# Patient Record
Sex: FEMALE | Race: OTHER | HISPANIC OR LATINO | URBAN - METROPOLITAN AREA
[De-identification: names, ages, dates, MRNs, and addresses within clinical notes are randomized per-mention and may not be internally consistent; named-entity substitution may affect disease eponyms.]

---

## 2017-12-10 ENCOUNTER — EMERGENCY (EMERGENCY)
Facility: HOSPITAL | Age: 42
LOS: 1 days | Discharge: HOME | End: 2017-12-10
Admitting: EMERGENCY MEDICINE
Payer: SELF-PAY

## 2017-12-10 VITALS
DIASTOLIC BLOOD PRESSURE: 62 MMHG | RESPIRATION RATE: 18 BRPM | TEMPERATURE: 98 F | WEIGHT: 169.98 LBS | OXYGEN SATURATION: 99 % | HEART RATE: 80 BPM | SYSTOLIC BLOOD PRESSURE: 101 MMHG

## 2017-12-10 DIAGNOSIS — Z98.51 TUBAL LIGATION STATUS: Chronic | ICD-10-CM

## 2017-12-10 DIAGNOSIS — R35.0 FREQUENCY OF MICTURITION: ICD-10-CM

## 2017-12-10 DIAGNOSIS — Z79.899 OTHER LONG TERM (CURRENT) DRUG THERAPY: ICD-10-CM

## 2017-12-10 DIAGNOSIS — Z79.1 LONG TERM (CURRENT) USE OF NON-STEROIDAL ANTI-INFLAMMATORIES (NSAID): ICD-10-CM

## 2017-12-10 DIAGNOSIS — N30.00 ACUTE CYSTITIS WITHOUT HEMATURIA: ICD-10-CM

## 2017-12-10 LAB
APPEARANCE UR: CLEAR — SIGNIFICANT CHANGE UP
BACTERIA # UR AUTO: PRESENT /HPF
BILIRUB UR-MCNC: NEGATIVE — SIGNIFICANT CHANGE UP
COLOR SPEC: YELLOW — SIGNIFICANT CHANGE UP
DIFF PNL FLD: NEGATIVE — SIGNIFICANT CHANGE UP
EPI CELLS # UR: (no result) /HPF (ref 0–5)
GLUCOSE UR QL: NEGATIVE — SIGNIFICANT CHANGE UP
KETONES UR-MCNC: NEGATIVE — SIGNIFICANT CHANGE UP
LEUKOCYTE ESTERASE UR-ACNC: (no result)
NITRITE UR-MCNC: NEGATIVE — SIGNIFICANT CHANGE UP
PH UR: 6 — SIGNIFICANT CHANGE UP (ref 5–8)
PROT UR-MCNC: NEGATIVE MG/DL — SIGNIFICANT CHANGE UP
RBC CASTS # UR COMP ASSIST: < 5 /HPF — SIGNIFICANT CHANGE UP
SP GR SPEC: 1.02 — SIGNIFICANT CHANGE UP (ref 1–1.03)
UROBILINOGEN FLD QL: 0.2 E.U./DL — SIGNIFICANT CHANGE UP
WBC UR QL: (no result) /HPF

## 2017-12-10 PROCEDURE — 99283 EMERGENCY DEPT VISIT LOW MDM: CPT

## 2017-12-10 RX ORDER — NITROFURANTOIN MACROCRYSTAL 50 MG
1 CAPSULE ORAL
Qty: 10 | Refills: 0
Start: 2017-12-10 | End: 2017-12-14

## 2017-12-10 NOTE — ED PROVIDER NOTE - MEDICAL DECISION MAKING DETAILS
pt will be given ABS and FU clinic I have discussed the discharge plan with the patient. The patient agrees with the plan, as discussed.  The patient understands Emergency Department diagnosis is a preliminary diagnosis often based on limited information and that the patient must adhere to the follow-up plan as discussed.  The patient understands that if the symptoms worsen or if prescribed medications do not have the desired/planned effect that the patient may return to the Emergency Department at any time for further evaluation and treatment.

## 2017-12-10 NOTE — ED PROVIDER NOTE - OBJECTIVE STATEMENT
pt to ed co pain and burning when urinating with urgency and frequency slight dysuria no fever no dizzy no headache no chills no NVD no chest pain no SOB no shakes no aches no other  injury no other complaints no CVAT

## 2017-12-12 LAB
-  AMPICILLIN/SULBACTAM: SIGNIFICANT CHANGE UP
-  AMPICILLIN: SIGNIFICANT CHANGE UP
-  CEFAZOLIN: SIGNIFICANT CHANGE UP
-  CEFTRIAXONE: SIGNIFICANT CHANGE UP
-  CIPROFLOXACIN: SIGNIFICANT CHANGE UP
-  GENTAMICIN: SIGNIFICANT CHANGE UP
-  NITROFURANTOIN: SIGNIFICANT CHANGE UP
-  PIPERACILLIN/TAZOBACTAM: SIGNIFICANT CHANGE UP
-  TOBRAMYCIN: SIGNIFICANT CHANGE UP
-  TRIMETHOPRIM/SULFAMETHOXAZOLE: SIGNIFICANT CHANGE UP
CULTURE RESULTS: SIGNIFICANT CHANGE UP
METHOD TYPE: SIGNIFICANT CHANGE UP
ORGANISM # SPEC MICROSCOPIC CNT: SIGNIFICANT CHANGE UP
ORGANISM # SPEC MICROSCOPIC CNT: SIGNIFICANT CHANGE UP
SPECIMEN SOURCE: SIGNIFICANT CHANGE UP

## 2018-02-11 ENCOUNTER — EMERGENCY (EMERGENCY)
Facility: HOSPITAL | Age: 43
LOS: 1 days | Discharge: HOME | End: 2018-02-11
Attending: EMERGENCY MEDICINE | Admitting: EMERGENCY MEDICINE
Payer: SELF-PAY

## 2018-02-11 VITALS
TEMPERATURE: 99 F | OXYGEN SATURATION: 98 % | SYSTOLIC BLOOD PRESSURE: 112 MMHG | HEART RATE: 107 BPM | RESPIRATION RATE: 20 BRPM | DIASTOLIC BLOOD PRESSURE: 73 MMHG | WEIGHT: 169.98 LBS | HEIGHT: 62 IN

## 2018-02-11 DIAGNOSIS — Z98.51 TUBAL LIGATION STATUS: Chronic | ICD-10-CM

## 2018-02-11 DIAGNOSIS — J02.9 ACUTE PHARYNGITIS, UNSPECIFIED: ICD-10-CM

## 2018-02-11 DIAGNOSIS — Z79.1 LONG TERM (CURRENT) USE OF NON-STEROIDAL ANTI-INFLAMMATORIES (NSAID): ICD-10-CM

## 2018-02-11 DIAGNOSIS — J11.1 INFLUENZA DUE TO UNIDENTIFIED INFLUENZA VIRUS WITH OTHER RESPIRATORY MANIFESTATIONS: ICD-10-CM

## 2018-02-11 DIAGNOSIS — Z79.899 OTHER LONG TERM (CURRENT) DRUG THERAPY: ICD-10-CM

## 2018-02-11 PROCEDURE — 99283 EMERGENCY DEPT VISIT LOW MDM: CPT

## 2018-02-11 NOTE — ED PROVIDER NOTE - PHYSICAL EXAMINATION
CONSTITUTIONAL: Well appearing, well nourished, awake, alert and in no apparent distress.  HEENT: Head is atraumatic. Eyes clear bilaterally, normal EOMI. Airway patent. no exudates, anterior lymphadenopathy  CARDIAC: Normal rate, regular rhythm.  Heart sounds S1, S2.   RESPIRATORY: Breath sounds clear and equal bilaterally.  GASTROINTESTINAL: Abdomen soft, non-tender, no guarding.  MUSCULOSKELETAL: Spine appears normal, range of motion is not limited, no muscle or joint tenderness.   NEUROLOGICAL: Alert and oriented, no focal deficits, no motor or sensory deficits.  SKIN: Skin normal color for race, warm, dry and intact. No evidence of rash.  PSYCHIATRIC: Alert and oriented to person, place, time/situation. normal mood and affect. no apparent risk to self or others.

## 2018-02-11 NOTE — ED PROVIDER NOTE - OBJECTIVE STATEMENT
Pt previously healthy with complaints of sorethroat, cough, headache for two days. no chest pain, sob, abd pain, n/v/f. states intermittent chills. has been trying dayquil for symptoms. has not received flu shot.

## 2018-02-11 NOTE — ED ADULT NURSE NOTE - OBJECTIVE STATEMENT
pt received in [facility] x 3 pt c/o fever , chills , sore throat and a productive cough( brings up yellow sputum )  since Friday

## 2018-02-11 NOTE — ED PROVIDER NOTE - MEDICAL DECISION MAKING DETAILS
s/s of  viral pharyngitis, centor negative. Advised on cont'd supportive care instructions lozenges, warm salt water gargles, etc. non toxic appearing, to return for worsening sx.

## 2018-07-13 ENCOUNTER — EMERGENCY (EMERGENCY)
Facility: HOSPITAL | Age: 43
LOS: 1 days | Discharge: HOME | End: 2018-07-13
Attending: EMERGENCY MEDICINE | Admitting: EMERGENCY MEDICINE
Payer: SELF-PAY

## 2018-07-13 VITALS
RESPIRATION RATE: 18 BRPM | TEMPERATURE: 100 F | WEIGHT: 177.91 LBS | SYSTOLIC BLOOD PRESSURE: 105 MMHG | OXYGEN SATURATION: 97 % | HEART RATE: 126 BPM | DIASTOLIC BLOOD PRESSURE: 59 MMHG

## 2018-07-13 VITALS
TEMPERATURE: 99 F | DIASTOLIC BLOOD PRESSURE: 68 MMHG | HEART RATE: 92 BPM | OXYGEN SATURATION: 96 % | RESPIRATION RATE: 18 BRPM | SYSTOLIC BLOOD PRESSURE: 104 MMHG

## 2018-07-13 DIAGNOSIS — Z79.1 LONG TERM (CURRENT) USE OF NON-STEROIDAL ANTI-INFLAMMATORIES (NSAID): ICD-10-CM

## 2018-07-13 DIAGNOSIS — J10.1 INFLUENZA DUE TO OTHER IDENTIFIED INFLUENZA VIRUS WITH OTHER RESPIRATORY MANIFESTATIONS: ICD-10-CM

## 2018-07-13 DIAGNOSIS — Z98.51 TUBAL LIGATION STATUS: Chronic | ICD-10-CM

## 2018-07-13 DIAGNOSIS — R50.9 FEVER, UNSPECIFIED: ICD-10-CM

## 2018-07-13 DIAGNOSIS — Z79.2 LONG TERM (CURRENT) USE OF ANTIBIOTICS: ICD-10-CM

## 2018-07-13 LAB
ALBUMIN SERPL ELPH-MCNC: 4.4 G/DL — SIGNIFICANT CHANGE UP (ref 3.3–5)
ALP SERPL-CCNC: 72 U/L — SIGNIFICANT CHANGE UP (ref 40–120)
ALT FLD-CCNC: 20 U/L — SIGNIFICANT CHANGE UP (ref 10–45)
ANION GAP SERPL CALC-SCNC: 13 MMOL/L — SIGNIFICANT CHANGE UP (ref 5–17)
ANISOCYTOSIS BLD QL: SLIGHT — SIGNIFICANT CHANGE UP
APPEARANCE UR: CLEAR — SIGNIFICANT CHANGE UP
AST SERPL-CCNC: 13 U/L — SIGNIFICANT CHANGE UP (ref 10–40)
BACTERIA # UR AUTO: PRESENT /HPF
BILIRUB SERPL-MCNC: 0.3 MG/DL — SIGNIFICANT CHANGE UP (ref 0.2–1.2)
BILIRUB UR-MCNC: NEGATIVE — SIGNIFICANT CHANGE UP
BUN SERPL-MCNC: 7 MG/DL — SIGNIFICANT CHANGE UP (ref 7–23)
CALCIUM SERPL-MCNC: 9.4 MG/DL — SIGNIFICANT CHANGE UP (ref 8.4–10.5)
CHLORIDE SERPL-SCNC: 99 MMOL/L — SIGNIFICANT CHANGE UP (ref 96–108)
CO2 SERPL-SCNC: 25 MMOL/L — SIGNIFICANT CHANGE UP (ref 22–31)
COLOR SPEC: YELLOW — SIGNIFICANT CHANGE UP
COMMENT - URINE: SIGNIFICANT CHANGE UP
CREAT SERPL-MCNC: 0.9 MG/DL — SIGNIFICANT CHANGE UP (ref 0.5–1.3)
DIFF PNL FLD: ABNORMAL
EOSINOPHIL NFR BLD AUTO: 2 % — SIGNIFICANT CHANGE UP (ref 0–6)
EPI CELLS # UR: SIGNIFICANT CHANGE UP /HPF (ref 0–5)
EXTRA BLUE TOP TUBE: SIGNIFICANT CHANGE UP
EXTRA SST TUBE: SIGNIFICANT CHANGE UP
FLUBV RNA SPEC QL NAA+PROBE: DETECTED
GLUCOSE SERPL-MCNC: 112 MG/DL — HIGH (ref 70–99)
GLUCOSE UR QL: NEGATIVE — SIGNIFICANT CHANGE UP
HCT VFR BLD CALC: 41.1 % — SIGNIFICANT CHANGE UP (ref 34.5–45)
HGB BLD-MCNC: 14 G/DL — SIGNIFICANT CHANGE UP (ref 11.5–15.5)
KETONES UR-MCNC: NEGATIVE — SIGNIFICANT CHANGE UP
LACTATE SERPL-SCNC: 1.6 MMOL/L — SIGNIFICANT CHANGE UP (ref 0.5–2)
LEUKOCYTE ESTERASE UR-ACNC: ABNORMAL
LYMPHOCYTES # BLD AUTO: 11 % — LOW (ref 13–44)
MACROCYTES BLD QL: SLIGHT — SIGNIFICANT CHANGE UP
MANUAL SMEAR VERIFICATION: SIGNIFICANT CHANGE UP
MCHC RBC-ENTMCNC: 28.9 PG — SIGNIFICANT CHANGE UP (ref 27–34)
MCHC RBC-ENTMCNC: 34.1 G/DL — SIGNIFICANT CHANGE UP (ref 32–36)
MCV RBC AUTO: 84.9 FL — SIGNIFICANT CHANGE UP (ref 80–100)
MICROCYTES BLD QL: SLIGHT — SIGNIFICANT CHANGE UP
MONOCYTES NFR BLD AUTO: 4 % — SIGNIFICANT CHANGE UP (ref 2–14)
NEUTROPHILS NFR BLD AUTO: 81 % — HIGH (ref 43–77)
NEUTS BAND # BLD: 2 % — SIGNIFICANT CHANGE UP
NITRITE UR-MCNC: NEGATIVE — SIGNIFICANT CHANGE UP
PH UR: 5.5 — SIGNIFICANT CHANGE UP (ref 5–8)
PLAT MORPH BLD: NORMAL — SIGNIFICANT CHANGE UP
PLATELET # BLD AUTO: 330 K/UL — SIGNIFICANT CHANGE UP (ref 150–400)
POTASSIUM SERPL-MCNC: 4.1 MMOL/L — SIGNIFICANT CHANGE UP (ref 3.5–5.3)
POTASSIUM SERPL-SCNC: 4.1 MMOL/L — SIGNIFICANT CHANGE UP (ref 3.5–5.3)
PROT SERPL-MCNC: 7.8 G/DL — SIGNIFICANT CHANGE UP (ref 6–8.3)
PROT UR-MCNC: NEGATIVE MG/DL — SIGNIFICANT CHANGE UP
RAPID RVP RESULT: DETECTED
RBC # BLD: 4.84 M/UL — SIGNIFICANT CHANGE UP (ref 3.8–5.2)
RBC # FLD: 12.6 % — SIGNIFICANT CHANGE UP (ref 10.3–16.9)
RBC BLD AUTO: ABNORMAL
RBC CASTS # UR COMP ASSIST: > 10 /HPF
SODIUM SERPL-SCNC: 137 MMOL/L — SIGNIFICANT CHANGE UP (ref 135–145)
SP GR SPEC: 1.01 — SIGNIFICANT CHANGE UP (ref 1–1.03)
UROBILINOGEN FLD QL: 0.2 E.U./DL — SIGNIFICANT CHANGE UP
WBC # BLD: 5.9 K/UL — SIGNIFICANT CHANGE UP (ref 3.8–10.5)
WBC # FLD AUTO: 5.9 K/UL — SIGNIFICANT CHANGE UP (ref 3.8–10.5)
WBC UR QL: ABNORMAL /HPF

## 2018-07-13 PROCEDURE — 99284 EMERGENCY DEPT VISIT MOD MDM: CPT

## 2018-07-13 PROCEDURE — 71045 X-RAY EXAM CHEST 1 VIEW: CPT | Mod: 26

## 2018-07-13 RX ORDER — SODIUM CHLORIDE 9 MG/ML
1000 INJECTION INTRAMUSCULAR; INTRAVENOUS; SUBCUTANEOUS ONCE
Refills: 0 | Status: COMPLETED | OUTPATIENT
Start: 2018-07-13 | End: 2018-07-13

## 2018-07-13 RX ORDER — ACETAMINOPHEN 500 MG
975 TABLET ORAL ONCE
Refills: 0 | Status: COMPLETED | OUTPATIENT
Start: 2018-07-13 | End: 2018-07-13

## 2018-07-13 RX ORDER — KETOROLAC TROMETHAMINE 30 MG/ML
30 SYRINGE (ML) INJECTION ONCE
Refills: 0 | Status: DISCONTINUED | OUTPATIENT
Start: 2018-07-13 | End: 2018-07-13

## 2018-07-13 RX ADMIN — SODIUM CHLORIDE 1000 MILLILITER(S): 9 INJECTION INTRAMUSCULAR; INTRAVENOUS; SUBCUTANEOUS at 12:46

## 2018-07-13 RX ADMIN — Medication 975 MILLIGRAM(S): at 12:17

## 2018-07-13 RX ADMIN — Medication 30 MILLIGRAM(S): at 14:23

## 2018-07-13 RX ADMIN — SODIUM CHLORIDE 1000 MILLILITER(S): 9 INJECTION INTRAMUSCULAR; INTRAVENOUS; SUBCUTANEOUS at 12:17

## 2018-07-13 NOTE — ED PROVIDER NOTE - OBJECTIVE STATEMENT
43 y/o f no pmh presents c/o cough, fever, chills, body aches for the past 4 days.  Pt stating symptoms started after visiting her mother in [place] last week who had the flu.  Pt denies abd pain, CP, SOB, all other ROS negative.

## 2018-07-13 NOTE — ED PROVIDER NOTE - MEDICAL DECISION MAKING DETAILS
41 y/o f cough, body aches, chills, fever x 4 days; tachycardic on exam, otherwise unremarkable, cxr negative, given IV fluid and tylenol/toradol with improvement in heart rate, influenza B +, pt feeling better, stable for d/c, supportive treatment with oral hydration, rest, tylenol

## 2018-07-13 NOTE — ED ADULT NURSE NOTE - OBJECTIVE STATEMENT
Patient presents to the ED complaining of fever and chills, since wednesday. Reports mother was diagnosed with flu on wednesday. Sepsis work up initiated.

## 2018-07-13 NOTE — ED ADULT TRIAGE NOTE - CHIEF COMPLAINT QUOTE
41 y/o female c/o fever, chills, cough, body aches since Monday. Pt reports mother was recently diagnosed with Flu.

## 2018-07-13 NOTE — ED PROVIDER NOTE - ATTENDING CONTRIBUTION TO CARE
43 yo F no PMH p/w cough, fever, chills, recent travel to [place].  Mother with similar + flu.  Tolerating PO, no sob.  Pt looks well.  Febrile + tachy.  Lungs clear.  CXR clear.  Labs noted + flu.  Plan supportive tx and outpt fu.

## 2018-07-14 LAB
CULTURE RESULTS: NO GROWTH — SIGNIFICANT CHANGE UP
SPECIMEN SOURCE: SIGNIFICANT CHANGE UP

## 2018-07-18 LAB
CULTURE RESULTS: SIGNIFICANT CHANGE UP
CULTURE RESULTS: SIGNIFICANT CHANGE UP
SPECIMEN SOURCE: SIGNIFICANT CHANGE UP
SPECIMEN SOURCE: SIGNIFICANT CHANGE UP

## 2018-12-21 ENCOUNTER — EMERGENCY (EMERGENCY)
Facility: HOSPITAL | Age: 43
LOS: 1 days | Discharge: HOME | End: 2018-12-21
Attending: EMERGENCY MEDICINE | Admitting: EMERGENCY MEDICINE
Payer: COMMERCIAL

## 2018-12-21 VITALS
OXYGEN SATURATION: 97 % | SYSTOLIC BLOOD PRESSURE: 108 MMHG | HEIGHT: 62 IN | WEIGHT: 177.91 LBS | RESPIRATION RATE: 18 BRPM | TEMPERATURE: 98 F | DIASTOLIC BLOOD PRESSURE: 71 MMHG | HEART RATE: 81 BPM

## 2018-12-21 VITALS
OXYGEN SATURATION: 97 % | RESPIRATION RATE: 18 BRPM | TEMPERATURE: 98 F | SYSTOLIC BLOOD PRESSURE: 99 MMHG | DIASTOLIC BLOOD PRESSURE: 66 MMHG | HEART RATE: 73 BPM

## 2018-12-21 DIAGNOSIS — Z98.51 TUBAL LIGATION STATUS: Chronic | ICD-10-CM

## 2018-12-21 PROCEDURE — 99283 EMERGENCY DEPT VISIT LOW MDM: CPT

## 2018-12-21 RX ORDER — OXYMETAZOLINE HYDROCHLORIDE 0.5 MG/ML
1 SPRAY NASAL ONCE
Refills: 0 | Status: COMPLETED | OUTPATIENT
Start: 2018-12-21 | End: 2018-12-21

## 2018-12-21 RX ORDER — ACETAMINOPHEN 500 MG
650 TABLET ORAL ONCE
Refills: 0 | Status: COMPLETED | OUTPATIENT
Start: 2018-12-21 | End: 2018-12-21

## 2018-12-21 RX ADMIN — Medication 650 MILLIGRAM(S): at 22:29

## 2018-12-21 RX ADMIN — OXYMETAZOLINE HYDROCHLORIDE 1 SPRAY(S): 0.5 SPRAY NASAL at 22:28

## 2018-12-21 NOTE — ED PROVIDER NOTE - NSFOLLOWUPINSTRUCTIONS_ED_ALL_ED_FT
Hemorragia nasal en los adultos  (Nosebleed, Adult)  ImageCuando hay hemorragia nasal, sale sylvia de la nariz. Las hemorragias nasales son frecuentes. Por lo general, no son un signo de casper afección grave.    Puede jean-pierre casper hemorragia nasal cuando un pequeño vaso sanguíneo de la nariz comienza a sangrar o si el recubrimiento de la nariz (membrana mucosa) se agrieta. Las causas frecuentes de las hemorragias nasales pueden ser las siguientes:    Alergias.  Resfríos.  Hurgarse la nariz.  Sonarse la nariz con demasiada intensidad.  Casper lesión por meterse un objeto en la nariz o recibir un golpe en la nariz.  Aire seco o frío.  Algunas causas menos frecuentes de las hemorragias nasales incluyen lo siguiente:    Gases tóxicos.  Algo anormal en la nariz o en los espacios llenos de aire en los huesos de la rama (senos).  Crecimientos en la nariz, cher pólipos.  Medicamentos o afecciones que enlentecen la coagulación de la sylvia.  Ciertas enfermedades o procedimientos que irritan o secan las fosas nasales.  INSTRUCCIONES PARA EL CUIDADO EN EL HOGAR  Si tiene casper hemorragia nasal:     Siéntese e incline la anna ligeramente hacia adelante.  Utilice casper toalla o un pañuelo de papel limpios para apretar los orificios nasales debajo de la parte ósea de la nariz. Después de 10 minutos, suelte la nariz y compruebe si vuelve a sangrar. No quite la presión antes de cirilo tiempo. Si aún hay hemorragia, repita la presión y sostenga brittany 10 minutos hasta que la hemorragia se detenga.  No coloque pañuelos de papel ni gasa en la nariz para detener la hemorragia.  Evite recostarse e inclinar la anna hacia atrás. Eso puede provocar casper acumulación de sylvia en la garganta y producir ahogo o tos.  Use un aerosol nasal descongestivo para aliviar casper hemorragia nasal cher se lo haya indicado el médico.  No se ponga vaselina ni aceite de vaselina en la nariz. Estos productos pueden gotear dentro de los pulmones.  Después de casper hemorragia nasal:     Evite sonarse la nariz u olfatear brittany unas cuantas horas.  No sherry esfuerzos, no levante objetos y no doble la cintura para agacharse brittany varios días. Puede retomar otras actividades normales tan pronto cher pueda.  Utilice un aerosol salino o un humidificador cher se lo haya indicado el médico.  La aspirina y los anticoagulantes aumentan la probabilidad de hemorragias. Si les recetan estos medicamentos y sufre de hemorragias nasales:    Pregúntele al médico si debe dejar de william los medicamentos o si debe ajustar la dosis.  No deje de william los medicamentos recomendados por el médico angelia que bo le indique lo contrario.    Si la hemorragia nasal se debe a la sequedad de las membranas mucosas, use un gel o aerosol nasal de solución salina de venta brennan. Dolores mantendrá las mucosas húmedas y permitirá que se cicatricen. Si debe usar un lubricante:    Elija nick que sea soluble en agua.  Use solamente la cantidad que necesita y con la frecuencia necesaria.  No se recueste hasta que hayan transcurrido varias horas después de usarlo.    SOLICITE ATENCIÓN MÉDICA SI:  Tiene fiebre.  Tiene hemorragias nasales frecuentes o con mayor frecuencia que lo habitual.  Aparecen hematomas con mucha facilidad.  Tiene casper hemorragia nasal por tener algo metido en la nariz.  Tiene sangrado en la boca.  Vomita o libera casper sustancia marrón al toser.  Tiene casper hemorragia nasal después de comenzar un medicamento nuevo.  SOLICITE ATENCIÓN MÉDICA DE INMEDIATO SI:  Tiene casper hemorragia nasal después de casper caída o casper lesión en la anna.  Tiene casper hemorragia nasal que no desaparece después de 20 minutos.  Siente que se desvanece o está débil.  Tiene hemorragias fuera de lo común en otras partes del cuerpo.  Tiene hematomas fuera de lo común en otras partes del cuerpo.  Tiene sudoración.  Vomita sylvia.  Esta información no tiene cher fin reemplazar el consejo del médico. Asegúrese de hacerle al médico cualquier pregunta que tenga.

## 2018-12-21 NOTE — ED PROVIDER NOTE - NSFOLLOWUPCLINICS_GEN_ALL_ED_FT
[facility]  ENT  [address] [place] [address]  Phone: [phone]  Fax: [phone]  Follow Up Time:

## 2018-12-21 NOTE — ED PROVIDER NOTE - OBJECTIVE STATEMENT
Pt previously healthy with complaints of nose bleed for three days, intermittently, cannot remember what triggered it, mainly from L side, no ac use, has some assoc frontal headache, no f/c. Pt has not tried anything for symptoms, no other aggravating or relieving factors.

## 2018-12-21 NOTE — ED ADULT TRIAGE NOTE - CHIEF COMPLAINT QUOTE
"I have been having nose bleeds for about 1 week everyday and today it was 4 times lasting about 5mins every time and I have a headache".

## 2018-12-21 NOTE — ED ADULT NURSE NOTE - NSIMPLEMENTINTERV_GEN_ALL_ED
Implemented All Universal Safety Interventions:  [place] to call system. Call bell, personal items and telephone within reach. Instruct patient to call for assistance. Room bathroom lighting operational. Non-slip footwear when patient is off stretcher. Physically safe environment: no spills, clutter or unnecessary equipment. Stretcher in lowest position, wheels locked, appropriate side rails in place.

## 2018-12-21 NOTE — ED PROVIDER NOTE - MEDICAL DECISION MAKING DETAILS
epistaxis, no unstable VS, posterior bleed, resolved after pressure in ED, will give instruc on limited afrin, vaseline, nose bleeding prec given- no nose blowing/humidifier, to return for any worsening sx

## 2018-12-21 NOTE — ED ADULT NURSE NOTE - OBJECTIVE STATEMENT
Pt presents to ED reporting nose bleeds every day x1 week, now has headache, no bleeding at this time. VSS.

## 2018-12-21 NOTE — ED PROVIDER NOTE - PHYSICAL EXAMINATION
CONSTITUTIONAL: Well appearing, well nourished, awake, alert and in no apparent distress.  HEENT: Head is atraumatic. Eyes clear bilaterally, normal EOMI. Airway patent. no  bleeding to posterior pharynx, bleeding sites visualized on septum on L not bleeding  CARDIAC: Normal rate, regular rhythm.  Heart sounds S1, S2.   RESPIRATORY: Breath sounds clear and equal bilaterally. no tachypnea, respiratory distress.   GASTROINTESTINAL: Abdomen soft, non-tender, no guarding, distension.  MUSCULOSKELETAL: Spine appears normal, no midline spinal tenderness, range of motion is not limited, no muscle or joint tenderness.   NEUROLOGICAL: Alert and oriented, no focal deficits, no motor or sensory deficits.  SKIN: Skin normal color for race, warm, dry and intact. No evidence of rash.  PSYCHIATRIC: Alert and oriented to person, place, time/situation. normal mood and affect. no apparent risk to self or others.

## 2018-12-25 DIAGNOSIS — R04.0 EPISTAXIS: ICD-10-CM

## 2018-12-25 DIAGNOSIS — Z79.2 LONG TERM (CURRENT) USE OF ANTIBIOTICS: ICD-10-CM

## 2018-12-25 DIAGNOSIS — Z79.1 LONG TERM (CURRENT) USE OF NON-STEROIDAL ANTI-INFLAMMATORIES (NSAID): ICD-10-CM

## 2019-12-27 ENCOUNTER — EMERGENCY (EMERGENCY)
Facility: HOSPITAL | Age: 44
LOS: 1 days | Discharge: HOME | End: 2019-12-27
Attending: EMERGENCY MEDICINE | Admitting: EMERGENCY MEDICINE
Payer: MEDICAID

## 2019-12-27 VITALS
SYSTOLIC BLOOD PRESSURE: 143 MMHG | HEIGHT: 61 IN | WEIGHT: 180.78 LBS | OXYGEN SATURATION: 97 % | TEMPERATURE: 102 F | HEART RATE: 137 BPM | DIASTOLIC BLOOD PRESSURE: 84 MMHG | RESPIRATION RATE: 18 BRPM

## 2019-12-27 VITALS
HEART RATE: 95 BPM | DIASTOLIC BLOOD PRESSURE: 71 MMHG | RESPIRATION RATE: 18 BRPM | TEMPERATURE: 98 F | SYSTOLIC BLOOD PRESSURE: 107 MMHG | OXYGEN SATURATION: 100 %

## 2019-12-27 DIAGNOSIS — Z98.51 TUBAL LIGATION STATUS: Chronic | ICD-10-CM

## 2019-12-27 LAB
ALBUMIN SERPL ELPH-MCNC: 4.3 G/DL — SIGNIFICANT CHANGE UP (ref 3.3–5)
ALP SERPL-CCNC: 81 U/L — SIGNIFICANT CHANGE UP (ref 40–120)
ALT FLD-CCNC: 27 U/L — SIGNIFICANT CHANGE UP (ref 10–45)
ANION GAP SERPL CALC-SCNC: 13 MMOL/L — SIGNIFICANT CHANGE UP (ref 5–17)
APPEARANCE UR: CLEAR — SIGNIFICANT CHANGE UP
APTT BLD: 34.2 SEC — SIGNIFICANT CHANGE UP (ref 27.5–36.3)
AST SERPL-CCNC: 18 U/L — SIGNIFICANT CHANGE UP (ref 10–40)
BACTERIA # UR AUTO: PRESENT /HPF
BASOPHILS # BLD AUTO: 0.04 K/UL — SIGNIFICANT CHANGE UP (ref 0–0.2)
BASOPHILS NFR BLD AUTO: 0.3 % — SIGNIFICANT CHANGE UP (ref 0–2)
BILIRUB SERPL-MCNC: 0.5 MG/DL — SIGNIFICANT CHANGE UP (ref 0.2–1.2)
BILIRUB UR-MCNC: NEGATIVE — SIGNIFICANT CHANGE UP
BUN SERPL-MCNC: 7 MG/DL — SIGNIFICANT CHANGE UP (ref 7–23)
CALCIUM SERPL-MCNC: 8.8 MG/DL — SIGNIFICANT CHANGE UP (ref 8.4–10.5)
CHLORIDE SERPL-SCNC: 101 MMOL/L — SIGNIFICANT CHANGE UP (ref 96–108)
CO2 SERPL-SCNC: 24 MMOL/L — SIGNIFICANT CHANGE UP (ref 22–31)
COLOR SPEC: YELLOW — SIGNIFICANT CHANGE UP
CREAT SERPL-MCNC: 0.75 MG/DL — SIGNIFICANT CHANGE UP (ref 0.5–1.3)
DIFF PNL FLD: ABNORMAL
EOSINOPHIL # BLD AUTO: 0.07 K/UL — SIGNIFICANT CHANGE UP (ref 0–0.5)
EOSINOPHIL NFR BLD AUTO: 0.5 % — SIGNIFICANT CHANGE UP (ref 0–6)
EPI CELLS # UR: ABNORMAL /HPF (ref 0–5)
FLU A RESULT: SIGNIFICANT CHANGE UP
FLU A RESULT: SIGNIFICANT CHANGE UP
FLUAV AG NPH QL: SIGNIFICANT CHANGE UP
FLUBV AG NPH QL: SIGNIFICANT CHANGE UP
GLUCOSE SERPL-MCNC: 138 MG/DL — HIGH (ref 70–99)
GLUCOSE UR QL: NEGATIVE — SIGNIFICANT CHANGE UP
HCG UR QL: NEGATIVE — SIGNIFICANT CHANGE UP
HCT VFR BLD CALC: 43.5 % — SIGNIFICANT CHANGE UP (ref 34.5–45)
HGB BLD-MCNC: 14.6 G/DL — SIGNIFICANT CHANGE UP (ref 11.5–15.5)
IMM GRANULOCYTES NFR BLD AUTO: 0.3 % — SIGNIFICANT CHANGE UP (ref 0–1.5)
INR BLD: 1.03 — SIGNIFICANT CHANGE UP (ref 0.88–1.16)
KETONES UR-MCNC: NEGATIVE — SIGNIFICANT CHANGE UP
LACTATE SERPL-SCNC: 1.3 MMOL/L — SIGNIFICANT CHANGE UP (ref 0.5–2)
LACTATE SERPL-SCNC: 2.6 MMOL/L — HIGH (ref 0.5–2)
LEUKOCYTE ESTERASE UR-ACNC: ABNORMAL
LYMPHOCYTES # BLD AUTO: 1.48 K/UL — SIGNIFICANT CHANGE UP (ref 1–3.3)
LYMPHOCYTES # BLD AUTO: 10.3 % — LOW (ref 13–44)
MCHC RBC-ENTMCNC: 28.6 PG — SIGNIFICANT CHANGE UP (ref 27–34)
MCHC RBC-ENTMCNC: 33.6 GM/DL — SIGNIFICANT CHANGE UP (ref 32–36)
MCV RBC AUTO: 85.1 FL — SIGNIFICANT CHANGE UP (ref 80–100)
MONOCYTES # BLD AUTO: 0.72 K/UL — SIGNIFICANT CHANGE UP (ref 0–0.9)
MONOCYTES NFR BLD AUTO: 5 % — SIGNIFICANT CHANGE UP (ref 2–14)
NEUTROPHILS # BLD AUTO: 11.96 K/UL — HIGH (ref 1.8–7.4)
NEUTROPHILS NFR BLD AUTO: 83.6 % — HIGH (ref 43–77)
NITRITE UR-MCNC: NEGATIVE — SIGNIFICANT CHANGE UP
NRBC # BLD: 0 /100 WBCS — SIGNIFICANT CHANGE UP (ref 0–0)
PH UR: 7 — SIGNIFICANT CHANGE UP (ref 5–8)
PLATELET # BLD AUTO: 258 K/UL — SIGNIFICANT CHANGE UP (ref 150–400)
POTASSIUM SERPL-MCNC: 3.9 MMOL/L — SIGNIFICANT CHANGE UP (ref 3.5–5.3)
POTASSIUM SERPL-SCNC: 3.9 MMOL/L — SIGNIFICANT CHANGE UP (ref 3.5–5.3)
PROT SERPL-MCNC: 7.1 G/DL — SIGNIFICANT CHANGE UP (ref 6–8.3)
PROT UR-MCNC: NEGATIVE MG/DL — SIGNIFICANT CHANGE UP
PROTHROM AB SERPL-ACNC: 11.6 SEC — SIGNIFICANT CHANGE UP (ref 10–12.9)
RBC # BLD: 5.11 M/UL — SIGNIFICANT CHANGE UP (ref 3.8–5.2)
RBC # FLD: 12.3 % — SIGNIFICANT CHANGE UP (ref 10.3–14.5)
RBC CASTS # UR COMP ASSIST: < 5 /HPF — SIGNIFICANT CHANGE UP
RSV RESULT: SIGNIFICANT CHANGE UP
RSV RNA RESP QL NAA+PROBE: SIGNIFICANT CHANGE UP
SODIUM SERPL-SCNC: 138 MMOL/L — SIGNIFICANT CHANGE UP (ref 135–145)
SP GR SPEC: 1.01 — SIGNIFICANT CHANGE UP (ref 1–1.03)
UROBILINOGEN FLD QL: 0.2 E.U./DL — SIGNIFICANT CHANGE UP
WBC # BLD: 14.31 K/UL — HIGH (ref 3.8–10.5)
WBC # FLD AUTO: 14.31 K/UL — HIGH (ref 3.8–10.5)
WBC UR QL: > 10 /HPF

## 2019-12-27 PROCEDURE — 71046 X-RAY EXAM CHEST 2 VIEWS: CPT | Mod: 26

## 2019-12-27 PROCEDURE — 93010 ELECTROCARDIOGRAM REPORT: CPT

## 2019-12-27 PROCEDURE — 99291 CRITICAL CARE FIRST HOUR: CPT

## 2019-12-27 RX ORDER — SODIUM CHLORIDE 9 MG/ML
1000 INJECTION INTRAMUSCULAR; INTRAVENOUS; SUBCUTANEOUS
Refills: 0 | Status: DISCONTINUED | OUTPATIENT
Start: 2019-12-27 | End: 2020-01-03

## 2019-12-27 RX ORDER — FLUTICASONE PROPIONATE 50 MCG
2 SPRAY, SUSPENSION NASAL ONCE
Refills: 0 | Status: COMPLETED | OUTPATIENT
Start: 2019-12-27 | End: 2019-12-27

## 2019-12-27 RX ORDER — ONDANSETRON 8 MG/1
1 TABLET, FILM COATED ORAL
Qty: 20 | Refills: 0
Start: 2019-12-27

## 2019-12-27 RX ORDER — SODIUM CHLORIDE 9 MG/ML
1000 INJECTION INTRAMUSCULAR; INTRAVENOUS; SUBCUTANEOUS ONCE
Refills: 0 | Status: COMPLETED | OUTPATIENT
Start: 2019-12-27 | End: 2019-12-27

## 2019-12-27 RX ORDER — ACETAMINOPHEN 500 MG
975 TABLET ORAL ONCE
Refills: 0 | Status: COMPLETED | OUTPATIENT
Start: 2019-12-27 | End: 2019-12-27

## 2019-12-27 RX ORDER — CEFPODOXIME PROXETIL 100 MG
1 TABLET ORAL
Qty: 14 | Refills: 0
Start: 2019-12-27 | End: 2020-01-02

## 2019-12-27 RX ORDER — SODIUM CHLORIDE 9 MG/ML
2500 INJECTION INTRAMUSCULAR; INTRAVENOUS; SUBCUTANEOUS ONCE
Refills: 0 | Status: COMPLETED | OUTPATIENT
Start: 2019-12-27 | End: 2019-12-27

## 2019-12-27 RX ORDER — FLUTICASONE PROPIONATE 50 MCG
2 SPRAY, SUSPENSION NASAL
Qty: 1 | Refills: 0
Start: 2019-12-27

## 2019-12-27 RX ORDER — METOCLOPRAMIDE HCL 10 MG
10 TABLET ORAL ONCE
Refills: 0 | Status: COMPLETED | OUTPATIENT
Start: 2019-12-27 | End: 2019-12-27

## 2019-12-27 RX ORDER — IBUPROFEN 200 MG
1 TABLET ORAL
Qty: 30 | Refills: 0
Start: 2019-12-27

## 2019-12-27 RX ORDER — CEFTRIAXONE 500 MG/1
1000 INJECTION, POWDER, FOR SOLUTION INTRAMUSCULAR; INTRAVENOUS ONCE
Refills: 0 | Status: COMPLETED | OUTPATIENT
Start: 2019-12-27 | End: 2019-12-27

## 2019-12-27 RX ADMIN — CEFTRIAXONE 100 MILLIGRAM(S): 500 INJECTION, POWDER, FOR SOLUTION INTRAMUSCULAR; INTRAVENOUS at 01:48

## 2019-12-27 RX ADMIN — SODIUM CHLORIDE 2500 MILLILITER(S): 9 INJECTION INTRAMUSCULAR; INTRAVENOUS; SUBCUTANEOUS at 03:02

## 2019-12-27 RX ADMIN — Medication 975 MILLIGRAM(S): at 01:20

## 2019-12-27 RX ADMIN — SODIUM CHLORIDE 125 MILLILITER(S): 9 INJECTION INTRAMUSCULAR; INTRAVENOUS; SUBCUTANEOUS at 03:02

## 2019-12-27 RX ADMIN — Medication 975 MILLIGRAM(S): at 03:56

## 2019-12-27 RX ADMIN — SODIUM CHLORIDE 2500 MILLILITER(S): 9 INJECTION INTRAMUSCULAR; INTRAVENOUS; SUBCUTANEOUS at 01:00

## 2019-12-27 RX ADMIN — SODIUM CHLORIDE 2000 MILLILITER(S): 9 INJECTION INTRAMUSCULAR; INTRAVENOUS; SUBCUTANEOUS at 04:07

## 2019-12-27 RX ADMIN — Medication 2 SPRAY(S): at 02:02

## 2019-12-27 RX ADMIN — CEFTRIAXONE 1000 MILLIGRAM(S): 500 INJECTION, POWDER, FOR SOLUTION INTRAMUSCULAR; INTRAVENOUS at 02:15

## 2019-12-27 RX ADMIN — Medication 10 MILLIGRAM(S): at 01:20

## 2019-12-27 NOTE — ED PROVIDER NOTE - ENMT, MLM
Airway patent, Nasal mucosa clear. Mouth with normal mucosa. Throat has no erythema, vesicles, no oropharyngeal exudates and uvula is midline. TM nl

## 2019-12-27 NOTE — ED PROVIDER NOTE - MUSCULOSKELETAL, MLM
Spine appears normal, neck supple w/o meningismus, range of motion is not limited, no muscle or joint tenderness

## 2019-12-27 NOTE — ED PROVIDER NOTE - CLINICAL SUMMARY MEDICAL DECISION MAKING FREE TEXT BOX
Pt c/o fever, uri type sx x 2 d w + sirs vs.  Strongly suspect viral uri rather than pna based on hpi.  No strep on exam.  Pt w recent uti 1 mo ago but no current uti sx - doubt uti, pyelo.  Pt c/o ha but no photophobia, meningismus, ams to suggest meningitis, encephalitis.  Sepsis protocol initiated - plan labs, ivf, cx's, fever control, reglan and flonase for pt's ha and nausea, congestion sx;  hold on abx at this time unless cxr or ua abnl.   Reassess.

## 2019-12-27 NOTE — ED PROVIDER NOTE - OBJECTIVE STATEMENT
45 yo female no pmh c/o 2 d fever, frontal ha, sinus pressure, nasal congestion, st, ear pain/itching, nonproductive cough w/o cp, palpitations or sob, myalgias, gen weakness, n w v x 1 today but later able to tolerate fluids.  Pt reports uti ~ 1 mo ago, only took 4 d of abx.  Pt denies current dysuria, freq, urgency.  No rash.  No neck stiffness, light sensitivity. No sick contacts, travel.  No relief w Dayquil - last dose 6p. 45 yo female no pmh c/o 2 d fever, frontal ha, sinus pressure, nasal congestion, st, ear pain/itching, nonproductive cough w/o cp, palpitations or sob, myalgias, gen weakness, n w v x 1 today but later able to tolerate fluids.  Pt reports uti ~ 1 mo ago, only took 4 d of abx.  Pt denies current dysuria, freq, urgency.  No rash.  No neck stiffness, light sensitivity. No sick contacts, travel.  No relief w Dayquil - last dose 6p.  S/p flu vaccine ~ 1 mo ago.

## 2019-12-27 NOTE — ED PROVIDER NOTE - PROGRESS NOTE DETAILS
Flu neg, wbc 14, lactate 2.6, cxr neg on my read, ua + uti - ceftriaxone ordered.  Plan repeat vs and lactate after ivf, abx. VS improved, lactate improved.  Will dc.

## 2019-12-27 NOTE — ED PROVIDER NOTE - PATIENT PORTAL LINK FT
You can access the FollowMyHealth Patient Portal offered by [facility] by registering at the following website: [email or web address]. By joining [facility]’s FollowMyHealth portal, you will also be able to view your health information using other applications (apps) compatible with our system. You can access the FollowMyHealth Patient Portal offered by [facility] by registering at the following website: [email or web address]. By joining [employer]’s FollowMyHealth portal, you will also be able to view your health information using other applications (apps) compatible with our system.

## 2019-12-27 NOTE — ED PROVIDER NOTE - NSFOLLOWUPCLINICS_GEN_ALL_ED_FT
[facility]  Family Medicine  [facility]. [address]  [place], [place] [facility]  Phone: [phone]  Fax:   Follow Up Time:

## 2019-12-27 NOTE — ED PROVIDER NOTE - CONSTITUTIONAL, MLM
normal... mildly ill appearing, awake, alert, oriented to person, place, time/situation and in no apparent distress.

## 2019-12-27 NOTE — ED PROVIDER NOTE - NSFOLLOWUPINSTRUCTIONS_ED_ALL_ED_FT
Pyelonephritis  URI    Rest.  Drink plenty of fluids.  Take Zofran - 1 tab on tongue every 6 hours as needed for nausea.  Take ibuprofen 1 tab every 6 hours with food as needed for pain. Use flonase (fluticasone) -  2 sprays each nostril once a day for nasal congestion as needed.  Try sudafed or similar for congestion, robitussin or similar for cough.  Add tylenol for additional relief for fever, body aches, pain.  Your symptoms will likely last for a total of 7-10 days.      Take cefpodoxime twice a day for 1 week for your uti; please finish the antibiotics.    Follow up with your pmd.  Return for increased pain, difficulty breathing, vomiting, any other concerns.     Viral Respiratory Infection    A viral respiratory infection is an illness that affects parts of the body used for breathing, like the lungs, nose, and throat. It is caused by a germ called a virus. Symptoms can include runny nose, coughing, sneezing, fatigue, body aches, sore throat, fever, or headache. Over the counter medicine can be used to manage the symptoms but the infection typically goes away on its own in 5 to 10 days.     SEEK IMMEDIATE MEDICAL CARE IF YOU HAVE ANY OF THE FOLLOWING SYMPTOMS: shortness of breath, chest pain, fever over 10 days, or lightheadedness/dizziness.     Pyelonephritis    Pyelonephritis is a kidney infection. In most cases, the infection clears up with treatment and does not cause further problems. More severe infections or chronic infections can sometimes spread to the bloodstream or lead to other problems with the kidneys. Symptoms include frequent or painful urination, abdominal pain, back pain, flank pain, fever/chills, nausea, or vomiting. If you were prescribed an antibiotic medicine, take it as told by your health care provider. Do not stop taking the antibiotic even if you start to feel better.    SEEK IMMEDIATE MEDICAL CARE IF YOU HAVE ANY OF THE FOLLOWING SYMPTOMS: inability to hold down antibiotics or fluids, worsening pain, dizziness/lightheadedness, or change in mental status.

## 2019-12-28 LAB
CULTURE RESULTS: SIGNIFICANT CHANGE UP
SPECIMEN SOURCE: SIGNIFICANT CHANGE UP

## 2020-01-01 DIAGNOSIS — J06.9 ACUTE UPPER RESPIRATORY INFECTION, UNSPECIFIED: ICD-10-CM

## 2020-01-01 DIAGNOSIS — R50.9 FEVER, UNSPECIFIED: ICD-10-CM

## 2020-01-01 DIAGNOSIS — N12 TUBULO-INTERSTITIAL NEPHRITIS, NOT SPECIFIED AS ACUTE OR CHRONIC: ICD-10-CM

## 2020-02-25 ENCOUNTER — EMERGENCY (EMERGENCY)
Facility: HOSPITAL | Age: 45
LOS: 1 days | Discharge: HOME | End: 2020-02-25
Admitting: EMERGENCY MEDICINE
Payer: MEDICAID

## 2020-02-25 VITALS
WEIGHT: 179.9 LBS | DIASTOLIC BLOOD PRESSURE: 71 MMHG | RESPIRATION RATE: 18 BRPM | HEART RATE: 79 BPM | OXYGEN SATURATION: 98 % | HEIGHT: 62 IN | SYSTOLIC BLOOD PRESSURE: 110 MMHG | TEMPERATURE: 98 F

## 2020-02-25 VITALS
SYSTOLIC BLOOD PRESSURE: 116 MMHG | DIASTOLIC BLOOD PRESSURE: 80 MMHG | RESPIRATION RATE: 17 BRPM | OXYGEN SATURATION: 99 % | TEMPERATURE: 99 F | HEART RATE: 75 BPM

## 2020-02-25 DIAGNOSIS — Z98.51 TUBAL LIGATION STATUS: Chronic | ICD-10-CM

## 2020-02-25 PROCEDURE — 70450 CT HEAD/BRAIN W/O DYE: CPT | Mod: 26

## 2020-02-25 PROCEDURE — 99285 EMERGENCY DEPT VISIT HI MDM: CPT

## 2020-02-25 PROCEDURE — 72125 CT NECK SPINE W/O DYE: CPT | Mod: 26

## 2020-02-25 RX ORDER — IBUPROFEN 200 MG
1 TABLET ORAL
Qty: 15 | Refills: 0
Start: 2020-02-25 | End: 2020-02-29

## 2020-02-25 RX ORDER — METHOCARBAMOL 500 MG/1
1000 TABLET, FILM COATED ORAL ONCE
Refills: 0 | Status: COMPLETED | OUTPATIENT
Start: 2020-02-25 | End: 2020-02-25

## 2020-02-25 RX ORDER — LIDOCAINE 4 G/100G
2 CREAM TOPICAL ONCE
Refills: 0 | Status: COMPLETED | OUTPATIENT
Start: 2020-02-25 | End: 2020-02-25

## 2020-02-25 RX ORDER — METHOCARBAMOL 500 MG/1
2 TABLET, FILM COATED ORAL
Qty: 18 | Refills: 0
Start: 2020-02-25 | End: 2020-02-27

## 2020-02-25 RX ORDER — ACETAMINOPHEN 500 MG
975 TABLET ORAL ONCE
Refills: 0 | Status: COMPLETED | OUTPATIENT
Start: 2020-02-25 | End: 2020-02-25

## 2020-02-25 RX ADMIN — METHOCARBAMOL 1000 MILLIGRAM(S): 500 TABLET, FILM COATED ORAL at 03:11

## 2020-02-25 RX ADMIN — Medication 975 MILLIGRAM(S): at 03:11

## 2020-02-25 RX ADMIN — Medication 975 MILLIGRAM(S): at 03:51

## 2020-02-25 RX ADMIN — LIDOCAINE 2 PATCH: 4 CREAM TOPICAL at 03:11

## 2020-02-25 NOTE — ED ADULT NURSE NOTE - CHPI ED NUR SYMPTOMS NEG
no anorexia/no bladder dysfunction/no bowel dysfunction/no constipation/no difficulty bearing weight/no fatigue/no motor function loss/no neck tenderness/no numbness/no tingling

## 2020-02-25 NOTE — ED ADULT NURSE REASSESSMENT NOTE - NS ED NURSE REASSESS COMMENT FT1
pt. sleeping lt. side in recliner, nad, assessment on-going, awaiting final CT reads, prelim. negative

## 2020-02-25 NOTE — ED PROVIDER NOTE - PHYSICAL EXAMINATION
CONSTITUTIONAL: Well-appearing; well-nourished; in no apparent distress.   HEAD: Normocephalic; atraumatic.   EYES: PERRL; EOM intact; conjunctiva and sclera clear  ENT: normal nose; no rhinorrhea; normal pharynx with no erythema or lesions.   NECK: Supple; non-tender; no LAD; no cspine tenderness but tenderness to occiput of head   CARDIOVASCULAR: Normal S1, S2; no murmurs, rubs, or gallops. Regular rate and rhythm.   RESPIRATORY: Breathing easily; breath sounds clear and equal bilaterally; no wheezes, rhonchi, or rales.  GI: Soft; non-distended; non-tender; no palpable organomegaly.   MSK: FROM at all extremities, normal tone   back: +tenderness to L lumbar paraspinal muscles  EXT: No cyanosis or edema; N/V intact  SKIN: Normal for age and race; warm; dry; good turgor; no apparent lesions or rash.   NEURO: AAO x 3, CN II-XII intact, normal speech, strength 5/5 bilateral upper and lower extremities, sensation intact, cerebellum intact, no ataxia, follows commands appropriately  PSYCHOLOGICAL: The patient’s mood and manner are appropriate.

## 2020-02-25 NOTE — ED PROVIDER NOTE - OBJECTIVE STATEMENT
43 yo F with no pmh c/o low back pain x 2 weeks. Pain was intermittent and relieved with tylenol but over the past 2 days pain is worse and travelling up to her neck. Denies fever, chills, numbness, tingling, focal weakness, incontinence, abd pain, hematuria, cp, sob. Denies trauma. Pt states today she started to get a HA and felt dizzy. Pt states she does not usually get headaches which is why she came to the ED.

## 2020-02-25 NOTE — ED PROVIDER NOTE - PATIENT PORTAL LINK FT
You can access the FollowMyHealth Patient Portal offered by [facility] by registering at the following website: [email or web address]. By joining [facility]’s FollowMyHealth portal, you will also be able to view your health information using other applications (apps) compatible with our system.

## 2020-02-25 NOTE — ED ADULT NURSE NOTE - NSIMPLEMENTINTERV_GEN_ALL_ED
Implemented All Universal Safety Interventions:  [place] to call system. Call bell, personal items and telephone within reach. Instruct patient to call for assistance. Room bathroom lighting operational. Non-slip footwear when patient is off stretcher. Physically safe environment: no spills, clutter or unnecessary equipment. Stretcher in lowest position, wheels locked, appropriate side rails in place.

## 2020-02-25 NOTE — ED PROVIDER NOTE - CLINICAL SUMMARY MEDICAL DECISION MAKING FREE TEXT BOX
43 yo F with no pmh c/o low back pain x 2 weeks. Pain was intermittent and relieved with tylenol but over the past 2 days pain is worse and travelling up to her neck. Denies fever, chills, numbness, tingling, focal weakness, incontinence, abd pain, hematuria, cp, sob. Denies trauma. Pt states today she started to get a HA and felt dizzy. VSS. Well appearing. +tenderness to occiput. No spinal tenderness. Neurologically intact. Strength 5/5 b/l. 43 yo F with no pmh c/o low back pain x 2 weeks. Pain was intermittent and relieved with tylenol but over the past 2 days pain is worse and travelling up to her neck. Denies fever, chills, numbness, tingling, focal weakness, incontinence, abd pain, hematuria, cp, sob. Denies trauma. Pt states today she started to get a HA and felt dizzy. VSS. Well appearing. +tenderness to occiput. No spinal tenderness. Neurologically intact. Strength 5/5 b/l. Head/Cspine CT unremarkable. Pt felling better.

## 2020-02-25 NOTE — ED ADULT NURSE NOTE - OBJECTIVE STATEMENT
presents to ED for back pain x 3 days  a-traumatic in nature, h/o same in past, denies other complaint, ambulating without difficulty, neuro in-tact, without deficit

## 2020-02-25 NOTE — ED PROVIDER NOTE - NSFOLLOWUPINSTRUCTIONS_ED_ALL_ED_FT
Headache    A headache is pain or discomfort felt around the head or neck area. The specific cause of a headache may not be found as there are many types including tension headaches, migraine headaches, and cluster headaches. Watch your condition for any changes. Things you can do to manage your pain include taking over the counter and prescription medications as instructed by your health care provider, lying down in a dark quiet room, limiting stress, getting regular sleep, and refraining from alcohol and tobacco products.    SEEK IMMEDIATE MEDICAL CARE IF YOU HAVE ANY OF THE FOLLOWING SYMPTOMS: fever, vomiting, stiff neck, loss of vision, problems with speech, muscle weakness, loss of balance, trouble walking, passing out, or confusion.    Back Pain    Back pain is very common in adults. The cause of back pain is rarely dangerous and the pain often gets better over time. The cause of your back pain may not be known and may include strain of muscles or ligaments, degeneration of the spinal disks, or arthritis. Occasionally the pain may radiate down your leg(s). Over-the-counter medicines to reduce pain and inflammation are often the most helpful. Stretching and remaining active frequently helps the healing process.     SEEK IMMEDIATE MEDICAL CARE IF YOU HAVE ANY OF THE FOLLOWING SYMPTOMS: bowel or bladder control problems, unusual weakness or numbness in your arms or legs, nausea or vomiting, abdominal pain, fever, dizziness/lightheadedness.

## 2020-02-29 DIAGNOSIS — M54.5 LOW BACK PAIN: ICD-10-CM

## 2020-02-29 DIAGNOSIS — R42 DIZZINESS AND GIDDINESS: ICD-10-CM

## 2020-02-29 DIAGNOSIS — R51 HEADACHE: ICD-10-CM

## 2020-08-28 NOTE — ED ADULT NURSE NOTE - TEMPLATE LIST FOR HEAD TO TOE ASSESSMENT
[facility]   Electrophysiology Follow up   Date: 8/31/2020    CC: Follow up on BiV-ICD    HPI: Tyrone Baez is a 78 y.o. female here for a follow up visit. She has history of Tetrology of Fallot s/p repair in 1963 with a Jazmin-Taussig shunt and VSD howie patch. She had an AICD placed for cardiomyopathy with EF 25% in 2008. Device was dual chamber [facility] [employer] [facility] with [place]ata lead. She is pacemaker dependant and is being V paced more than 99%. She underwent LHC/RHC at [address] [place] [address] on July 9, 2015. Coronaries were normal, and EF reported 35%. Tetralogy of fallot repair 12/5/1963   Dr Charis Germain; Both Jazmin-Taussig shunts ligated. Extreme infundibular stenosis. Normal cors noted. Repeated aortic cross-clamps noted, none exceeding 12 mins. Transverse incision into RV. Infundibulum extensively resected from PA side as impossible to identify from below. Marked aortic dextroposition noted. Large VSD closed with howie patch. No TAP. Initially CHB. \"The following is copied from [facility], Dr. Renato Ware: \"Myla had surgical repair of tetralogy of Fallot at the of 15 years by Dr. Charis Germain at [facility]. These were in the early days of myocardial protection as well as in the early days of cardiopulmonary bypass. The surgery was notable for the absence of a trans annular patch, and also for a transverse rather short RV incision. The infundibulum was very tightly (3mm) stenosed and repeated bouts of aortic cross clamp were required to deal with this and the VSD closure. This clearly placed her myocardium at risk of ischemic injury. In 2008 she presented with heart block and an ejection fraction off 25%, and was fitted with an endovascular cardio defibrillator with AV sequential pacing ability. \"    She follows Dr. Marylene Slim for SINTIA and also nocturnal hypoxemia. Due to heart failure and wide paced QRS on 8/26/2015 she had Biv upgrade of her device.  Upgrade was very · Lymphadenopathy: Has no cervical adenopathy. · Neurological: Alert and oriented. Follows command, No Gross deficit   · Skin: Skin is warm, No rash noted. · Psychiatric: Has a normal behavior       Labs:  Lab Results   Component Value Date    TSHREFLEX 4.04 03/20/2015    CREATININE 0.9 08/25/2020    CREATININE 0.9 02/27/2020    AST 16 08/25/2020    ALT 16 08/25/2020         ECG 8/31/20  None    Echo 8/31/2020  Pending     Echo 8/8/19  Summary:     1. Tetralogy of Fallot      - s/p repair with non trans-annular patch infundibular resection (5/12/1963, [facility]). 2. Limited echocardiographic windows. Unable to accurately quantify ventricular function. 3. Left ventricle is mildly dilated and the systolic function is low normal.   4. Right ventricle is borderline dilated and the systolic function is moderately diminished. 5. Paradoxical interventricular septum motion. 6. TR jet velocity estimates RV pressure to be ~33 mmHg + CVP. 7. Mild tricuspid valve regurgitation. 8. There is no obvious residual septal defect. 9. Dilated right atrium. 10. No right ventricular outflow tract obstruction. 11. There is no significant pericardial effusion. 12. Compared to the previous echocardiogram of 4/4/2016, there is no significant change. Review of the prior echocardiogram, the left ventricular systolic function appears unchanged, low-normal. However, functional assessment is extremely limited. Recommend cardiac MRI given limited acoustic windows. Echo 7/21/17:  Technically difficult study due to lung interface and limited windows.   Patient has history of Tetralogy of Fallot repair with 2 Jazmin-Taussig   shunt and atrial septal defect repair.   Normal left ventricle size.  There is mild concentric left ventricular   hypertrophy.   Left ventricular function difficult to estimate due to poor endocardial   definition but appears reduced, likely severely reduced and comparable to   previous two atrium was normal in size. Right ventricle:   Poorly visualized. Pacer wire or catheter noted in right ventricle. Objective parameters of systolic function were low normal to mild reduced: TAPSE: 1.8cm. Tricuspid annular systolic velocity: 5.0FY/E. Ventricular septum:   Septal motion showed abnormal function and dyssynergy. Pulmonic valve:  Poorly visualized. By limited imaging, there did not appear to be significant stenosis. Pulmonary regurgitation is likely at least moderate. Doppler:  Peak gradient: 3mm Hg (S). Tricuspid valve:   Poorly visualized. Doppler:  Trivial regurgitation. Pulmonary artery:   Not visualized. Systolic pressure could not be accurately estimated. Right atrium:   Poorly visualized. The atrium was normal in size. Pacer wire or catheter noted in right atrium. [facility] 7/9/15 (@ [facility]): HEMODYNAMICS: LVEDP: 15, LVEF:40%, RVEF30%  No gradient across the aortic valve, Gradient across the pulmonic valve:  peak gradient was 8 mmHg, mean gradient was 6 mmHg. Mild Transventicular patch leak, shunt was calculated to be not significant at 0.94. Moderate transpulmonic valve gradient. Narrowing of the pulmonic annulus, with no signficant stenosis of the pulmonic valve appreciated.     Patient Active Problem List   Diagnosis    Implantable cardioverter-defibrillator (ICD) in situ    Primary cardiomyopathy ([address]    Bradycardia    Tetralogy of Fallot s/p repair    Chronic systolic CHF (congestive heart failure) ([address]    Elevated diaphragm    Hiatal hernia    Hemorrhagic stroke ([address]    Cardiomyopathy ([address]    SNITIA treated with BiPAP    Fatigue    Presence of biventricular AICD    Acute respiratory failure with hypoxia (HCC)    COPD, severe (HCC)    Restrictive lung disease    Hoarseness of voice    Mixed hyperlipidemia    Non morbid obesity, unspecified obesity type    SOB (shortness of breath)     Assessment and plan:       Non-ischemic CMP, LV systolic dysfunction with history of prior EF 35% reported by cardiac cath, at [facility] heart, s/p dual chamber AICD in 2008, pacemaker dependent with > 99% V pacing      Device interrogation today showed that the device is RRT. Patient is pacemaker dependent. Discussed generator replacement. Risk-benefit alternative discussed with the patient. She opted to proceed. -Follows up with heart failure clinic and [facility].   -I reviewed device interrogation today. Device functions normally. AP 83.3%  98.6% RRT reached 8/24/2020   The risks, benefits and alternatives of the procedure were discussed with the patient. The risks including, but not limited to, the risks of bleeding, infection, pain, device malfunction, lead dislodgement, radiation exposure, injury to cardiac and surrounding structures (including pneumothorax), stroke, cardiac perforation, tamponade, need for emergent heart surgery, myocardial infarction and death were discussed in detail. Dual vs single chamber device, including risks and benefits were discussed with patient. HTN  Vitals:    08/31/20 1414   BP: 118/81   Pulse: 83   Resp: 18      -Home BP monitoring encourage with a BP goal <130/80        Obesity  Body mass index is 38.25 kg/m². - Excessive weight is complicating assessment and treatment. It is placing patient at risk for multiple co-morbidities as well as early death and contributing to the patient's presentation. - discussed weight management with diet and exercise      Schedule Generator change    All questions and concerns were addressed to the patient/family. Alternatives to my treatment were discussed. Benita Shah MD, MPH  [place] [address]   Office: [phone]     Scribe attestation:  This note was scribed in the presence of Benita Shah MD by Ginny Houston RN  Physician Attestation: I, Dr. Benita Shah, confirm that the scribe's documentation has been prepared under my direction and personally reviewed by me in its entirety. I also confirm that the note above accurately reflects all work, treatment, procedures, and medical decision making performed by me. Back Pain

## 2020-11-21 ENCOUNTER — EMERGENCY (EMERGENCY)
Facility: HOSPITAL | Age: 45
LOS: 1 days | Discharge: HOME | End: 2020-11-21
Attending: EMERGENCY MEDICINE | Admitting: EMERGENCY MEDICINE
Payer: MEDICAID

## 2020-11-21 VITALS
TEMPERATURE: 98 F | HEART RATE: 86 BPM | RESPIRATION RATE: 15 BRPM | OXYGEN SATURATION: 98 % | HEIGHT: 62 IN | DIASTOLIC BLOOD PRESSURE: 106 MMHG | WEIGHT: 184.09 LBS | SYSTOLIC BLOOD PRESSURE: 133 MMHG

## 2020-11-21 VITALS
RESPIRATION RATE: 17 BRPM | TEMPERATURE: 98 F | DIASTOLIC BLOOD PRESSURE: 71 MMHG | SYSTOLIC BLOOD PRESSURE: 116 MMHG | OXYGEN SATURATION: 96 % | HEART RATE: 105 BPM

## 2020-11-21 DIAGNOSIS — Z98.51 TUBAL LIGATION STATUS: Chronic | ICD-10-CM

## 2020-11-21 DIAGNOSIS — R51.9 HEADACHE, UNSPECIFIED: ICD-10-CM

## 2020-11-21 DIAGNOSIS — Z20.828 CONTACT WITH AND (SUSPECTED) EXPOSURE TO OTHER VIRAL COMMUNICABLE DISEASES: ICD-10-CM

## 2020-11-21 LAB
ALBUMIN SERPL ELPH-MCNC: 4.3 G/DL — SIGNIFICANT CHANGE UP (ref 3.3–5)
ALP SERPL-CCNC: 84 U/L — SIGNIFICANT CHANGE UP (ref 40–120)
ALT FLD-CCNC: 64 U/L — HIGH (ref 10–45)
ANION GAP SERPL CALC-SCNC: 11 MMOL/L — SIGNIFICANT CHANGE UP (ref 5–17)
AST SERPL-CCNC: 43 U/L — HIGH (ref 10–40)
BASOPHILS # BLD AUTO: 0.02 K/UL — SIGNIFICANT CHANGE UP (ref 0–0.2)
BASOPHILS NFR BLD AUTO: 0.3 % — SIGNIFICANT CHANGE UP (ref 0–2)
BILIRUB SERPL-MCNC: 0.6 MG/DL — SIGNIFICANT CHANGE UP (ref 0.2–1.2)
BUN SERPL-MCNC: 10 MG/DL — SIGNIFICANT CHANGE UP (ref 7–23)
CALCIUM SERPL-MCNC: 9 MG/DL — SIGNIFICANT CHANGE UP (ref 8.4–10.5)
CHLORIDE SERPL-SCNC: 102 MMOL/L — SIGNIFICANT CHANGE UP (ref 96–108)
CO2 SERPL-SCNC: 25 MMOL/L — SIGNIFICANT CHANGE UP (ref 22–31)
CREAT SERPL-MCNC: 0.68 MG/DL — SIGNIFICANT CHANGE UP (ref 0.5–1.3)
EOSINOPHIL # BLD AUTO: 0.09 K/UL — SIGNIFICANT CHANGE UP (ref 0–0.5)
EOSINOPHIL NFR BLD AUTO: 1.5 % — SIGNIFICANT CHANGE UP (ref 0–6)
GLUCOSE SERPL-MCNC: 138 MG/DL — HIGH (ref 70–99)
HCT VFR BLD CALC: 40.8 % — SIGNIFICANT CHANGE UP (ref 34.5–45)
HGB BLD-MCNC: 13.8 G/DL — SIGNIFICANT CHANGE UP (ref 11.5–15.5)
IMM GRANULOCYTES NFR BLD AUTO: 0.3 % — SIGNIFICANT CHANGE UP (ref 0–1.5)
LYMPHOCYTES # BLD AUTO: 2.01 K/UL — SIGNIFICANT CHANGE UP (ref 1–3.3)
LYMPHOCYTES # BLD AUTO: 33 % — SIGNIFICANT CHANGE UP (ref 13–44)
MCHC RBC-ENTMCNC: 28.7 PG — SIGNIFICANT CHANGE UP (ref 27–34)
MCHC RBC-ENTMCNC: 33.8 GM/DL — SIGNIFICANT CHANGE UP (ref 32–36)
MCV RBC AUTO: 84.8 FL — SIGNIFICANT CHANGE UP (ref 80–100)
MONOCYTES # BLD AUTO: 0.54 K/UL — SIGNIFICANT CHANGE UP (ref 0–0.9)
MONOCYTES NFR BLD AUTO: 8.9 % — SIGNIFICANT CHANGE UP (ref 2–14)
NEUTROPHILS # BLD AUTO: 3.41 K/UL — SIGNIFICANT CHANGE UP (ref 1.8–7.4)
NEUTROPHILS NFR BLD AUTO: 56 % — SIGNIFICANT CHANGE UP (ref 43–77)
NRBC # BLD: 0 /100 WBCS — SIGNIFICANT CHANGE UP (ref 0–0)
PLATELET # BLD AUTO: 264 K/UL — SIGNIFICANT CHANGE UP (ref 150–400)
POTASSIUM SERPL-MCNC: 4.1 MMOL/L — SIGNIFICANT CHANGE UP (ref 3.5–5.3)
POTASSIUM SERPL-SCNC: 4.1 MMOL/L — SIGNIFICANT CHANGE UP (ref 3.5–5.3)
PROT SERPL-MCNC: 7.1 G/DL — SIGNIFICANT CHANGE UP (ref 6–8.3)
RBC # BLD: 4.81 M/UL — SIGNIFICANT CHANGE UP (ref 3.8–5.2)
RBC # FLD: 12.8 % — SIGNIFICANT CHANGE UP (ref 10.3–14.5)
SARS-COV-2 RNA SPEC QL NAA+PROBE: SIGNIFICANT CHANGE UP
SODIUM SERPL-SCNC: 138 MMOL/L — SIGNIFICANT CHANGE UP (ref 135–145)
WBC # BLD: 6.09 K/UL — SIGNIFICANT CHANGE UP (ref 3.8–10.5)
WBC # FLD AUTO: 6.09 K/UL — SIGNIFICANT CHANGE UP (ref 3.8–10.5)

## 2020-11-21 PROCEDURE — 70450 CT HEAD/BRAIN W/O DYE: CPT | Mod: 26

## 2020-11-21 PROCEDURE — 99285 EMERGENCY DEPT VISIT HI MDM: CPT

## 2020-11-21 RX ORDER — KETOROLAC TROMETHAMINE 30 MG/ML
30 SYRINGE (ML) INJECTION ONCE
Refills: 0 | Status: DISCONTINUED | OUTPATIENT
Start: 2020-11-21 | End: 2020-11-21

## 2020-11-21 RX ORDER — SODIUM CHLORIDE 9 MG/ML
1000 INJECTION INTRAMUSCULAR; INTRAVENOUS; SUBCUTANEOUS ONCE
Refills: 0 | Status: COMPLETED | OUTPATIENT
Start: 2020-11-21 | End: 2020-11-21

## 2020-11-21 RX ORDER — ACETAMINOPHEN 500 MG
1000 TABLET ORAL ONCE
Refills: 0 | Status: COMPLETED | OUTPATIENT
Start: 2020-11-21 | End: 2020-11-21

## 2020-11-21 RX ADMIN — Medication 30 MILLIGRAM(S): at 03:18

## 2020-11-21 RX ADMIN — SODIUM CHLORIDE 1000 MILLILITER(S): 9 INJECTION INTRAMUSCULAR; INTRAVENOUS; SUBCUTANEOUS at 02:04

## 2020-11-21 RX ADMIN — Medication 1000 MILLIGRAM(S): at 03:11

## 2020-11-21 RX ADMIN — Medication 400 MILLIGRAM(S): at 02:18

## 2020-11-21 RX ADMIN — SODIUM CHLORIDE 1000 MILLILITER(S): 9 INJECTION INTRAMUSCULAR; INTRAVENOUS; SUBCUTANEOUS at 03:11

## 2020-11-21 NOTE — ED PROVIDER NOTE - CARE PROVIDERS DIRECT ADDRESSES
[email or web address]

## 2020-11-21 NOTE — ED ADULT NURSE NOTE - OBJECTIVE STATEMENT
Patient to the ED with complaint of worsening frontal headache radiating towards occipital head begining 3 days ago associated with weakness.  Patient denies any other symptoms denies injury.

## 2020-11-21 NOTE — ED PROVIDER NOTE - PROVIDER TOKENS
PROVIDER:[TOKEN:[[id number]]],PROVIDER:[TOKEN:[[id number]]],PROVIDER:[TOKEN:[[id number]]]

## 2020-11-21 NOTE — ED PROVIDER NOTE - PATIENT PORTAL LINK FT
You can access the FollowMyHealth Patient Portal offered by [facility] by registering at the following website: [email or web address]. By joining [employer]’s FollowMyHealth portal, you will also be able to view your health information using other applications (apps) compatible with our system.

## 2020-11-21 NOTE — ED PROVIDER NOTE - OBJECTIVE STATEMENT
46 yo female w/o any significant PMH, in the Er c/o persistent , severe HA for the past 3 days. Pt mentioned she has been taking Tylenol and it helps for a short period of time with her pain, and than CROWELL comes back again. Pt described HA as  intense pressure  and pain  to her frontal scalp area. Pt denies n/v, vision changes, CP, SOB, abdominal pain, back pain, dysuria, diarrhea, rash, denies any cold or flu-like symptoms. 44 yo female w/o any significant PMH, in the Er c/o persistent HA for the past 3 days. Pt mentioned she has been taking Tylenol and it helps for a short period of time with her pain, and than CROWELL comes back again. Pt described HA as  intense pressure  and pain  to her frontal scalp area. Pt denies n/v, vision changes, CP, SOB, abdominal pain, back pain, dysuria, diarrhea, rash, denies any cold or flu-like symptoms.

## 2020-11-21 NOTE — ED PROVIDER NOTE - CLINICAL SUMMARY MEDICAL DECISION MAKING FREE TEXT BOX
46 yo female in the ER with headache for the past 3 days. Pt afebrile, and non-toxic appearing. labs and CT scan done. pt reports HA subsided after IV fluids, Tylenol and Toradol. Results of CT scan discussed with pt. Empty sella turcica filled with CSF, similar findings as on previous head CT scan from 2019, suspicious for pseudotumor cerebri. Pt is seeking discharge home now. Importance to f/u with neurologist  for further evaluation explained. ER referral coordinator will help with appointment scheduling.

## 2020-11-21 NOTE — ED PROVIDER NOTE - PROGRESS NOTE DETAILS
pt feels much better. results of labs and CT scan discussed. pt is seeking discharge home. will advise to f/u with neurologist next week. importance to f/u explained.

## 2020-11-21 NOTE — ED ADULT NURSE NOTE - NSIMPLEMENTINTERV_GEN_ALL_ED
Implemented All Universal Safety Interventions:  [place] to call system. Call bell, personal items and telephone within reach. Instruct patient to call for assistance. Room bathroom lighting operational. Non-slip footwear when patient is off stretcher. Physically safe environment: no spills, clutter or unnecessary equipment. Stretcher in lowest position, wheels locked, appropriate side rails in place.

## 2020-11-21 NOTE — ED PROVIDER NOTE - CARE PROVIDER_API CALL
Nathaniel Irving  NEUROLOGY  [address], [address] [place] [address]  Phone: [phone]  Fax: [phone]  Follow Up Time:     Luz Maria Zhu  EEG/EPILEPSY  [address] [place] [address]  Phone: [phone]  Fax: [phone]  Follow Up Time:     Tarsha Moser  NEUROLOGY  [address], [address] [place] [address]  Phone: [phone]  Fax: [phone]  Follow Up Time:

## 2020-11-21 NOTE — ED ADULT TRIAGE NOTE - CHIEF COMPLAINT QUOTE
Pt presents with c/o "headache" x 2 days, localized to frontal region. No vision/speech/gait deficits. No other neurological complaints observed or reported. Denies any history. Took Tylenol approx one hour PTA with some relief.

## 2020-11-21 NOTE — ED PROVIDER NOTE - ATTENDING CONTRIBUTION TO CARE
Attending Statement: I have personally performed a face to face diagnostic evaluation on this patient. I have reviewed the ACP note and agree with the history, exam and plan of care, except as noted.     Attending Contribution to Care:  44 yo female in the ER with headache for the past 3 days. Pt afebrile, and non-toxic appearing. labs and CT scan done. pt reports HA subsided after IV fluids, Tylenol and Toradol. Results of CT scan discussed with pt. Empty sella turcica filled with CSF, similar findings as on previous head CT scan from 2019, suspicious for pseudotumor cerebri. Pt is seeking discharge home now. Importance to f/u with neurologist  for further evaluation explained. ER referral coordinator will help with appointment scheduling.

## 2020-11-30 PROBLEM — Z00.00 ENCOUNTER FOR PREVENTIVE HEALTH EXAMINATION: Status: ACTIVE | Noted: 2020-11-30

## 2021-01-27 ENCOUNTER — EMERGENCY (EMERGENCY)
Facility: HOSPITAL | Age: 46
LOS: 1 days | Discharge: HOME | End: 2021-01-27
Attending: EMERGENCY MEDICINE | Admitting: EMERGENCY MEDICINE
Payer: MEDICAID

## 2021-01-27 VITALS
OXYGEN SATURATION: 98 % | SYSTOLIC BLOOD PRESSURE: 116 MMHG | HEIGHT: 62 IN | DIASTOLIC BLOOD PRESSURE: 71 MMHG | RESPIRATION RATE: 18 BRPM | HEART RATE: 96 BPM | TEMPERATURE: 99 F

## 2021-01-27 DIAGNOSIS — R35.0 FREQUENCY OF MICTURITION: ICD-10-CM

## 2021-01-27 DIAGNOSIS — U07.1 COVID-19: ICD-10-CM

## 2021-01-27 DIAGNOSIS — Z98.51 TUBAL LIGATION STATUS: Chronic | ICD-10-CM

## 2021-01-27 LAB
APPEARANCE UR: CLEAR — SIGNIFICANT CHANGE UP
BACTERIA # UR AUTO: PRESENT /HPF
BILIRUB UR-MCNC: NEGATIVE — SIGNIFICANT CHANGE UP
COLOR SPEC: YELLOW — SIGNIFICANT CHANGE UP
COMMENT - URINE: SIGNIFICANT CHANGE UP
DIFF PNL FLD: NEGATIVE — SIGNIFICANT CHANGE UP
EPI CELLS # UR: ABNORMAL /HPF (ref 0–5)
GLUCOSE UR QL: NEGATIVE — SIGNIFICANT CHANGE UP
HCG UR QL: NEGATIVE — SIGNIFICANT CHANGE UP
HYALINE CASTS # UR AUTO: SIGNIFICANT CHANGE UP /LPF (ref 0–2)
KETONES UR-MCNC: NEGATIVE — SIGNIFICANT CHANGE UP
LEUKOCYTE ESTERASE UR-ACNC: NEGATIVE — SIGNIFICANT CHANGE UP
NITRITE UR-MCNC: NEGATIVE — SIGNIFICANT CHANGE UP
PH UR: 6 — SIGNIFICANT CHANGE UP (ref 5–8)
PROT UR-MCNC: ABNORMAL MG/DL
RBC CASTS # UR COMP ASSIST: < 5 /HPF — SIGNIFICANT CHANGE UP
SP GR SPEC: >=1.03 — SIGNIFICANT CHANGE UP (ref 1–1.03)
UROBILINOGEN FLD QL: 1 E.U./DL — SIGNIFICANT CHANGE UP
WBC UR QL: < 5 /HPF — SIGNIFICANT CHANGE UP

## 2021-01-27 PROCEDURE — 99285 EMERGENCY DEPT VISIT HI MDM: CPT

## 2021-01-27 NOTE — ED ADULT NURSE NOTE - OBJECTIVE STATEMENT
pt is 45y female, here for urinary frequency, pain on urination, body aches and occasional cough x4 days, denies any abd pain, fevers, n/v/d or flank pain, pt is A&Ox3, abd soft, non-tender, nondistended, no CVA tenderness noted, NAD present

## 2021-01-27 NOTE — ED ADULT TRIAGE NOTE - CCCP TRG CHIEF CMPLNT
urinary symptoms O-L Flap Text: The defect edges were debeveled with a #15 scalpel blade.  Given the location of the defect, shape of the defect and the proximity to free margins an O-L flap was deemed most appropriate.  Using a sterile surgical marker, an appropriate advancement flap was drawn incorporating the defect and placing the expected incisions within the relaxed skin tension lines where possible.    The area thus outlined was incised deep to adipose tissue with a #15 scalpel blade.  The skin margins were undermined to an appropriate distance in all directions utilizing iris scissors.

## 2021-01-28 VITALS
HEART RATE: 88 BPM | DIASTOLIC BLOOD PRESSURE: 65 MMHG | RESPIRATION RATE: 18 BRPM | OXYGEN SATURATION: 97 % | SYSTOLIC BLOOD PRESSURE: 128 MMHG

## 2021-01-28 LAB
ALBUMIN SERPL ELPH-MCNC: 4.1 G/DL — SIGNIFICANT CHANGE UP (ref 3.3–5)
ALP SERPL-CCNC: 62 U/L — SIGNIFICANT CHANGE UP (ref 40–120)
ALT FLD-CCNC: 49 U/L — HIGH (ref 10–45)
ANION GAP SERPL CALC-SCNC: 10 MMOL/L — SIGNIFICANT CHANGE UP (ref 5–17)
AST SERPL-CCNC: 33 U/L — SIGNIFICANT CHANGE UP (ref 10–40)
BASOPHILS # BLD AUTO: 0 K/UL — SIGNIFICANT CHANGE UP (ref 0–0.2)
BASOPHILS NFR BLD AUTO: 0 % — SIGNIFICANT CHANGE UP (ref 0–2)
BILIRUB SERPL-MCNC: 0.2 MG/DL — SIGNIFICANT CHANGE UP (ref 0.2–1.2)
BUN SERPL-MCNC: 10 MG/DL — SIGNIFICANT CHANGE UP (ref 7–23)
CALCIUM SERPL-MCNC: 8.3 MG/DL — LOW (ref 8.4–10.5)
CHLORIDE SERPL-SCNC: 102 MMOL/L — SIGNIFICANT CHANGE UP (ref 96–108)
CO2 SERPL-SCNC: 27 MMOL/L — SIGNIFICANT CHANGE UP (ref 22–31)
CREAT SERPL-MCNC: 0.81 MG/DL — SIGNIFICANT CHANGE UP (ref 0.5–1.3)
EOSINOPHIL # BLD AUTO: 0.02 K/UL — SIGNIFICANT CHANGE UP (ref 0–0.5)
EOSINOPHIL NFR BLD AUTO: 0.6 % — SIGNIFICANT CHANGE UP (ref 0–6)
GLUCOSE SERPL-MCNC: 124 MG/DL — HIGH (ref 70–99)
HCT VFR BLD CALC: 41.1 % — SIGNIFICANT CHANGE UP (ref 34.5–45)
HGB BLD-MCNC: 13.7 G/DL — SIGNIFICANT CHANGE UP (ref 11.5–15.5)
IMM GRANULOCYTES NFR BLD AUTO: 0.3 % — SIGNIFICANT CHANGE UP (ref 0–1.5)
LYMPHOCYTES # BLD AUTO: 1.27 K/UL — SIGNIFICANT CHANGE UP (ref 1–3.3)
LYMPHOCYTES # BLD AUTO: 36 % — SIGNIFICANT CHANGE UP (ref 13–44)
MCHC RBC-ENTMCNC: 28.2 PG — SIGNIFICANT CHANGE UP (ref 27–34)
MCHC RBC-ENTMCNC: 33.3 GM/DL — SIGNIFICANT CHANGE UP (ref 32–36)
MCV RBC AUTO: 84.7 FL — SIGNIFICANT CHANGE UP (ref 80–100)
MONOCYTES # BLD AUTO: 0.28 K/UL — SIGNIFICANT CHANGE UP (ref 0–0.9)
MONOCYTES NFR BLD AUTO: 7.9 % — SIGNIFICANT CHANGE UP (ref 2–14)
NEUTROPHILS # BLD AUTO: 1.95 K/UL — SIGNIFICANT CHANGE UP (ref 1.8–7.4)
NEUTROPHILS NFR BLD AUTO: 55.2 % — SIGNIFICANT CHANGE UP (ref 43–77)
NRBC # BLD: 0 /100 WBCS — SIGNIFICANT CHANGE UP (ref 0–0)
PLATELET # BLD AUTO: 194 K/UL — SIGNIFICANT CHANGE UP (ref 150–400)
POTASSIUM SERPL-MCNC: 3.5 MMOL/L — SIGNIFICANT CHANGE UP (ref 3.5–5.3)
POTASSIUM SERPL-SCNC: 3.5 MMOL/L — SIGNIFICANT CHANGE UP (ref 3.5–5.3)
PROT SERPL-MCNC: 6.9 G/DL — SIGNIFICANT CHANGE UP (ref 6–8.3)
RBC # BLD: 4.85 M/UL — SIGNIFICANT CHANGE UP (ref 3.8–5.2)
RBC # FLD: 12.9 % — SIGNIFICANT CHANGE UP (ref 10.3–14.5)
SARS-COV-2 RNA SPEC QL NAA+PROBE: DETECTED
SODIUM SERPL-SCNC: 139 MMOL/L — SIGNIFICANT CHANGE UP (ref 135–145)
WBC # BLD: 3.53 K/UL — LOW (ref 3.8–10.5)
WBC # FLD AUTO: 3.53 K/UL — LOW (ref 3.8–10.5)

## 2021-01-28 PROCEDURE — 74177 CT ABD & PELVIS W/CONTRAST: CPT | Mod: 26,MC

## 2021-01-28 PROCEDURE — 71045 X-RAY EXAM CHEST 1 VIEW: CPT | Mod: 26

## 2021-01-28 RX ORDER — IBUPROFEN 200 MG
600 TABLET ORAL ONCE
Refills: 0 | Status: COMPLETED | OUTPATIENT
Start: 2021-01-28 | End: 2021-01-28

## 2021-01-28 RX ORDER — SODIUM CHLORIDE 9 MG/ML
1000 INJECTION INTRAMUSCULAR; INTRAVENOUS; SUBCUTANEOUS ONCE
Refills: 0 | Status: COMPLETED | OUTPATIENT
Start: 2021-01-28 | End: 2021-01-28

## 2021-01-28 RX ORDER — ACETAMINOPHEN 500 MG
1000 TABLET ORAL ONCE
Refills: 0 | Status: COMPLETED | OUTPATIENT
Start: 2021-01-28 | End: 2021-01-28

## 2021-01-28 RX ADMIN — Medication 400 MILLIGRAM(S): at 01:37

## 2021-01-28 RX ADMIN — Medication 1000 MILLIGRAM(S): at 03:17

## 2021-01-28 RX ADMIN — Medication 600 MILLIGRAM(S): at 01:16

## 2021-01-28 RX ADMIN — SODIUM CHLORIDE 1000 MILLILITER(S): 9 INJECTION INTRAMUSCULAR; INTRAVENOUS; SUBCUTANEOUS at 01:15

## 2021-01-28 RX ADMIN — SODIUM CHLORIDE 1000 MILLILITER(S): 9 INJECTION INTRAMUSCULAR; INTRAVENOUS; SUBCUTANEOUS at 03:17

## 2021-01-28 RX ADMIN — SODIUM CHLORIDE 1000 MILLILITER(S): 9 INJECTION INTRAMUSCULAR; INTRAVENOUS; SUBCUTANEOUS at 03:20

## 2021-01-28 NOTE — ED PROVIDER NOTE - PATIENT PORTAL LINK FT
You can access the FollowMyHealth Patient Portal offered by [facility] by registering at the following website: [email or web address]. By joining [employer]’s FollowMyHealth portal, you will also be able to view your health information using other applications (apps) compatible with our system.

## 2021-01-28 NOTE — ED PROVIDER NOTE - PROGRESS NOTE DETAILS
Kaya: test results discussed with patient.  appears well.  will provide pulse oximeter.  will check walking O2 sat prior to DC home. Has primary care in [place].  Isolation and return precautions discussed.  Expressed clear understanding and says she will return to ED if worse or if she has any concerns.

## 2021-01-28 NOTE — ED PROVIDER NOTE - OBJECTIVE STATEMENT
46 y/o F coming in for urinary frequency, dysuria for the past few days. Patient c/o chronic cough for the past week and body aches, well appearing in ED. VSS.

## 2021-01-28 NOTE — ED PROVIDER NOTE - NSFOLLOWUPINSTRUCTIONS_ED_ALL_ED_FT
Use pulse oximeter to check oxygen level every 8 hours.  Call your primary care physician tomorrow to arrange follow up care.  Return to the Emergency Department if you have any new or worsening symptoms, or if you have any concerns.    Use un oxímetro de pulso para verificar el nivel de oxígeno cada 8 horas.  Llame a lisa médico de atención primaria mañana para coordinar la atención de seguimiento.  Regrese al Departamento de Emergencias si tiene síntomas nuevos o que empeoran, o si tiene alguna inquietud.

## 2021-01-28 NOTE — ED PROVIDER NOTE - CLINICAL SUMMARY MEDICAL DECISION MAKING FREE TEXT BOX
46 y/o F with urinary symptoms and cough, will check UA and urine culture, COVID swab done, reassess.

## 2021-01-29 ENCOUNTER — TRANSCRIPTION ENCOUNTER (OUTPATIENT)
Age: 46
End: 2021-01-29

## 2021-01-29 LAB
CULTURE RESULTS: SIGNIFICANT CHANGE UP
SPECIMEN SOURCE: SIGNIFICANT CHANGE UP

## 2021-06-06 ENCOUNTER — CLINICAL SUPPORT (OUTPATIENT)
Dept: URGENT CARE | Facility: CLINIC | Age: 46
End: 2021-06-06
Payer: COMMERCIAL

## 2021-06-06 DIAGNOSIS — Z11.59 ENCOUNTER FOR SCREENING FOR OTHER VIRAL DISEASES: Primary | ICD-10-CM

## 2021-06-06 LAB — SARS-COV-2 RNA RESP QL NAA+PROBE: NOT DETECTED

## 2021-06-06 PROCEDURE — U0005 INFEC AGEN DETEC AMPLI PROBE: HCPCS | Performed by: INTERNAL MEDICINE

## 2021-06-06 PROCEDURE — U0003 INFECTIOUS AGENT DETECTION BY NUCLEIC ACID (DNA OR RNA); SEVERE ACUTE RESPIRATORY SYNDROME CORONAVIRUS 2 (SARS-COV-2) (CORONAVIRUS DISEASE [COVID-19]), AMPLIFIED PROBE TECHNIQUE, MAKING USE OF HIGH THROUGHPUT TECHNOLOGIES AS DESCRIBED BY CMS-2020-01-R: HCPCS | Performed by: INTERNAL MEDICINE

## 2021-10-08 ENCOUNTER — TELEPHONE (OUTPATIENT)
Dept: PODIATRY | Facility: CLINIC | Age: 46
End: 2021-10-08

## 2021-10-14 ENCOUNTER — TELEPHONE (OUTPATIENT)
Dept: PODIATRY | Facility: CLINIC | Age: 46
End: 2021-10-14

## 2021-10-14 ENCOUNTER — OFFICE VISIT (OUTPATIENT)
Dept: PODIATRY | Facility: CLINIC | Age: 46
End: 2021-10-14
Payer: COMMERCIAL

## 2021-10-14 VITALS — HEART RATE: 88 BPM | WEIGHT: 138.44 LBS | SYSTOLIC BLOOD PRESSURE: 130 MMHG | DIASTOLIC BLOOD PRESSURE: 88 MMHG

## 2021-10-14 DIAGNOSIS — S93.401S SPRAIN OF RIGHT ANKLE, UNSPECIFIED LIGAMENT, SEQUELA: Primary | ICD-10-CM

## 2021-10-14 DIAGNOSIS — M25.571 PAIN IN JOINT INVOLVING RIGHT ANKLE AND FOOT: ICD-10-CM

## 2021-10-14 PROCEDURE — 99212 OFFICE O/P EST SF 10 MIN: CPT | Mod: PBBFAC | Performed by: PODIATRIST

## 2021-10-14 PROCEDURE — 99999 PR PBB SHADOW E&M-EST. PATIENT-LVL II: ICD-10-PCS | Mod: PBBFAC,,, | Performed by: PODIATRIST

## 2021-10-14 PROCEDURE — 99999 PR PBB SHADOW E&M-EST. PATIENT-LVL II: CPT | Mod: PBBFAC,,, | Performed by: PODIATRIST

## 2021-10-14 PROCEDURE — 99203 PR OFFICE/OUTPT VISIT, NEW, LEVL III, 30-44 MIN: ICD-10-PCS | Mod: S$GLB,,, | Performed by: PODIATRIST

## 2021-10-14 PROCEDURE — 99203 OFFICE O/P NEW LOW 30 MIN: CPT | Mod: S$GLB,,, | Performed by: PODIATRIST

## 2021-10-14 RX ORDER — AMLODIPINE BESYLATE 10 MG/1
10 TABLET ORAL
COMMUNITY
End: 2023-03-07

## 2021-10-14 RX ORDER — THYROID 60 MG/1
60 TABLET ORAL
COMMUNITY

## 2021-10-14 RX ORDER — DICLOFENAC SODIUM 10 MG/G
2 GEL TOPICAL 4 TIMES DAILY
Qty: 1 TUBE | Refills: 2 | Status: SHIPPED | OUTPATIENT
Start: 2021-10-14 | End: 2022-03-11 | Stop reason: CLARIF

## 2021-10-14 RX ORDER — AMOXICILLIN 500 MG
2 CAPSULE ORAL
COMMUNITY

## 2021-11-02 NOTE — ED ADULT NURSE NOTE - ED CARDIAC RHYTHM
Have Your Skin Lesions Been Treated?: not been treated Is This A New Presentation, Or A Follow-Up?: Skin Lesions regular

## 2021-11-10 ENCOUNTER — HOSPITAL ENCOUNTER (OUTPATIENT)
Dept: RADIOLOGY | Facility: HOSPITAL | Age: 46
Discharge: HOME | End: 2021-11-10
Attending: PODIATRIST
Payer: COMMERCIAL

## 2021-11-10 DIAGNOSIS — S93.401S SPRAIN OF RIGHT ANKLE, UNSPECIFIED LIGAMENT, SEQUELA: ICD-10-CM

## 2021-11-10 DIAGNOSIS — M25.571 PAIN IN JOINT INVOLVING RIGHT ANKLE AND FOOT: ICD-10-CM

## 2021-11-10 PROCEDURE — 73721 MRI JNT OF LWR EXTRE W/O DYE: CPT | Mod: TC,RT

## 2021-11-10 PROCEDURE — 73721 MRI JNT OF LWR EXTRE W/O DYE: CPT | Mod: 26,RT,GC, | Performed by: INTERNAL MEDICINE

## 2021-11-10 PROCEDURE — 73721 MRI ANKLE WITHOUT CONTRAST RIGHT: ICD-10-PCS | Mod: 26,RT,GC, | Performed by: INTERNAL MEDICINE

## 2021-11-12 ENCOUNTER — PATIENT MESSAGE (OUTPATIENT)
Dept: PODIATRY | Facility: CLINIC | Age: 46
End: 2021-11-12
Payer: COMMERCIAL

## 2021-11-12 ENCOUNTER — TELEPHONE (OUTPATIENT)
Dept: PODIATRY | Facility: CLINIC | Age: 46
End: 2021-11-12
Payer: COMMERCIAL

## 2021-11-17 ENCOUNTER — OFFICE VISIT (OUTPATIENT)
Dept: PODIATRY | Facility: CLINIC | Age: 46
End: 2021-11-17
Payer: COMMERCIAL

## 2021-11-17 VITALS — WEIGHT: 141.13 LBS | HEART RATE: 76 BPM | DIASTOLIC BLOOD PRESSURE: 84 MMHG | SYSTOLIC BLOOD PRESSURE: 130 MMHG

## 2021-11-17 DIAGNOSIS — S93.401S SPRAIN OF RIGHT ANKLE, UNSPECIFIED LIGAMENT, SEQUELA: Primary | ICD-10-CM

## 2021-11-17 DIAGNOSIS — M25.571 PAIN IN JOINT INVOLVING RIGHT ANKLE AND FOOT: ICD-10-CM

## 2021-11-17 PROCEDURE — 99999 PR PBB SHADOW E&M-EST. PATIENT-LVL II: ICD-10-PCS | Mod: PBBFAC,,, | Performed by: PODIATRIST

## 2021-11-17 PROCEDURE — 99999 PR PBB SHADOW E&M-EST. PATIENT-LVL II: CPT | Mod: PBBFAC,,, | Performed by: PODIATRIST

## 2021-11-17 PROCEDURE — 99214 OFFICE O/P EST MOD 30 MIN: CPT | Mod: S$GLB,,, | Performed by: PODIATRIST

## 2021-11-17 PROCEDURE — 99214 PR OFFICE/OUTPT VISIT, EST, LEVL IV, 30-39 MIN: ICD-10-PCS | Mod: S$GLB,,, | Performed by: PODIATRIST

## 2021-11-17 RX ORDER — MELOXICAM 15 MG/1
15 TABLET ORAL DAILY
Qty: 30 TABLET | Refills: 0 | Status: SHIPPED | OUTPATIENT
Start: 2021-11-17 | End: 2022-01-19

## 2022-01-19 ENCOUNTER — OFFICE VISIT (OUTPATIENT)
Dept: OBSTETRICS AND GYNECOLOGY | Facility: CLINIC | Age: 47
End: 2022-01-19
Payer: COMMERCIAL

## 2022-01-19 VITALS
HEIGHT: 63 IN | SYSTOLIC BLOOD PRESSURE: 128 MMHG | DIASTOLIC BLOOD PRESSURE: 80 MMHG | WEIGHT: 139.56 LBS | BODY MASS INDEX: 24.73 KG/M2

## 2022-01-19 DIAGNOSIS — D25.1 FIBROIDS, INTRAMURAL: Primary | ICD-10-CM

## 2022-01-19 DIAGNOSIS — Z32.02 NEGATIVE PREGNANCY TEST: ICD-10-CM

## 2022-01-19 DIAGNOSIS — N92.1 MENOMETRORRHAGIA: ICD-10-CM

## 2022-01-19 DIAGNOSIS — N85.2 BULKY OR ENLARGED UTERUS: ICD-10-CM

## 2022-01-19 LAB
B-HCG UR QL: NEGATIVE
CTP QC/QA: YES

## 2022-01-19 PROCEDURE — 99204 OFFICE O/P NEW MOD 45 MIN: CPT | Mod: 25,S$GLB,, | Performed by: OBSTETRICS & GYNECOLOGY

## 2022-01-19 PROCEDURE — 99204 PR OFFICE/OUTPT VISIT, NEW, LEVL IV, 45-59 MIN: ICD-10-PCS | Mod: 25,S$GLB,, | Performed by: OBSTETRICS & GYNECOLOGY

## 2022-01-19 PROCEDURE — 4010F PR ACE/ARB THEARPY RXD/TAKEN: ICD-10-PCS | Mod: CPTII,S$GLB,, | Performed by: OBSTETRICS & GYNECOLOGY

## 2022-01-19 PROCEDURE — 58100 PR BIOPSY OF UTERUS LINING: ICD-10-PCS | Mod: S$GLB,,, | Performed by: OBSTETRICS & GYNECOLOGY

## 2022-01-19 PROCEDURE — 99999 PR PBB SHADOW E&M-EST. PATIENT-LVL III: ICD-10-PCS | Mod: PBBFAC,,, | Performed by: OBSTETRICS & GYNECOLOGY

## 2022-01-19 PROCEDURE — 1159F PR MEDICATION LIST DOCUMENTED IN MEDICAL RECORD: ICD-10-PCS | Mod: CPTII,S$GLB,, | Performed by: OBSTETRICS & GYNECOLOGY

## 2022-01-19 PROCEDURE — 3074F SYST BP LT 130 MM HG: CPT | Mod: CPTII,S$GLB,, | Performed by: OBSTETRICS & GYNECOLOGY

## 2022-01-19 PROCEDURE — 1159F MED LIST DOCD IN RCRD: CPT | Mod: CPTII,S$GLB,, | Performed by: OBSTETRICS & GYNECOLOGY

## 2022-01-19 PROCEDURE — 88305 TISSUE EXAM BY PATHOLOGIST: CPT | Performed by: PATHOLOGY

## 2022-01-19 PROCEDURE — 81025 URINE PREGNANCY TEST: CPT | Mod: S$GLB,,, | Performed by: OBSTETRICS & GYNECOLOGY

## 2022-01-19 PROCEDURE — 3079F PR MOST RECENT DIASTOLIC BLOOD PRESSURE 80-89 MM HG: ICD-10-PCS | Mod: CPTII,S$GLB,, | Performed by: OBSTETRICS & GYNECOLOGY

## 2022-01-19 PROCEDURE — 88305 TISSUE EXAM BY PATHOLOGIST: ICD-10-PCS | Mod: 26,,, | Performed by: PATHOLOGY

## 2022-01-19 PROCEDURE — 99999 PR PBB SHADOW E&M-EST. PATIENT-LVL III: CPT | Mod: PBBFAC,,, | Performed by: OBSTETRICS & GYNECOLOGY

## 2022-01-19 PROCEDURE — 3008F PR BODY MASS INDEX (BMI) DOCUMENTED: ICD-10-PCS | Mod: CPTII,S$GLB,, | Performed by: OBSTETRICS & GYNECOLOGY

## 2022-01-19 PROCEDURE — 3074F PR MOST RECENT SYSTOLIC BLOOD PRESSURE < 130 MM HG: ICD-10-PCS | Mod: CPTII,S$GLB,, | Performed by: OBSTETRICS & GYNECOLOGY

## 2022-01-19 PROCEDURE — 3079F DIAST BP 80-89 MM HG: CPT | Mod: CPTII,S$GLB,, | Performed by: OBSTETRICS & GYNECOLOGY

## 2022-01-19 PROCEDURE — 4010F ACE/ARB THERAPY RXD/TAKEN: CPT | Mod: CPTII,S$GLB,, | Performed by: OBSTETRICS & GYNECOLOGY

## 2022-01-19 PROCEDURE — 58100 BIOPSY OF UTERUS LINING: CPT | Mod: S$GLB,,, | Performed by: OBSTETRICS & GYNECOLOGY

## 2022-01-19 PROCEDURE — 81025 POCT URINE PREGNANCY: ICD-10-PCS | Mod: S$GLB,,, | Performed by: OBSTETRICS & GYNECOLOGY

## 2022-01-19 PROCEDURE — 88305 TISSUE EXAM BY PATHOLOGIST: CPT | Mod: 26,,, | Performed by: PATHOLOGY

## 2022-01-19 PROCEDURE — 3008F BODY MASS INDEX DOCD: CPT | Mod: CPTII,S$GLB,, | Performed by: OBSTETRICS & GYNECOLOGY

## 2022-01-19 RX ORDER — BISACODYL 5 MG/1
1 TABLET, COATED ORAL DAILY
COMMUNITY

## 2022-01-19 RX ORDER — HYDROCHLOROTHIAZIDE 25 MG/1
25 TABLET ORAL DAILY
COMMUNITY
Start: 2022-01-19 | End: 2022-03-08

## 2022-01-19 RX ORDER — IRBESARTAN 150 MG/1
150 TABLET ORAL DAILY
COMMUNITY
Start: 2022-01-19 | End: 2022-03-08

## 2022-01-19 NOTE — PROGRESS NOTES
CC:  Heavy, irregular uterine bleeding secondary to fibroids    HPI:   is a 46 y.o.   patient who presents today for evaluation discussion of treatment options for persistent heavy, irregular uterine bleeding secondary to fibroids.  Patient reports a recent pelvic sonogram with Dr. Bk Horton at [facility] that revealed fibroids and an endometrial mass/suspected endometrial polyp.  Patient is here to discuss potential surgical treatment options.  Patient reports that her quality life is significantly disrupted by this continuous bleeding.  Patient reports that she has heavy menstrual cycles with clots and that this episode of bleeding has been occurring since mid December.  Patient denies any recent endometrial biopsies.  Patient is transitioning care from [facility] to [facility].    Patient's last menstrual period was 12/15/2021.    Past Medical History:   Diagnosis Date    Hodgkin's lymphoma 2006    Hypertension     Infertility, female        Past Surgical History:   Procedure Laterality Date    CARPAL TUNNEL RELEASE Bilateral      &      SECTION  2004     SECTION  2011     ROS:  GENERAL: Feeling well overall.   SKIN: Denies rash or lesions.   HEAD: Denies head injury or headache.   NODES: Denies enlarged lymph nodes.   CHEST: Denies chest pain or shortness of breath.   CARDIOVASCULAR: Denies palpitations or left sided chest pain.   ABDOMEN: No abdominal pain, nausea, vomiting or rectal bleeding.   URINARY: No dysuria, hematuria, or burning on urination.  REPRODUCTIVE: See HPI.   BREASTS: Denies pain, lumps, or nipple discharge.   HEMATOLOGIC: No easy bruisability or excessive bleeding.   MUSCULOSKELETAL: Denies joint pain or swelling.   NEUROLOGIC: Denies syncope or weakness.   PSYCHIATRIC: Denies depression.    PE:   APPEARANCE: Well nourished, well developed, in no acute distress.  ABDOMEN: Soft. No tenderness or masses. No hernias.  No CVA tenderness.  Pfannenstiel skin incision scar noted/healed.  VULVA: No lesions. Normal female genitalia.  URETHRAL MEATUS: Normal size and location, no lesions, no prolapse.  URETHRA: No masses, tenderness, prolapse or scarring.  VAGINA: Moist and well rugated, no discharge, no significant cystocele or rectocele.  CERVIX: No discharge.  Supracervical bleeding noted.  A nabothian cyst is noted near the cervical os.  Cervix displaced anteriorly.  UTERUS:  12 weeks size, irregular shape with multiple fibroids, mobile, non-tender, bladder base nontender.  ADNEXA: No masses, tenderness or CDS nodularity.  ANUS PERINEUM: Normal.    Diagnosis:  1. Fibroids, intramural    2. Menometrorrhagia    3. Bulky or enlarged uterus    4. Negative pregnancy test        Procedure:  Time out performed:     ENDOMETRIAL BIOPSY:    46 y.o. year old female patient   with heavy, irregular uterine bleeding and fibroids with an endometrial mass diagnosed on pelvic ultrasound.    Patient's last menstrual period was 12/15/2021.    PRE ENDOMETRIAL BIOPSY COUNSELING:  The patient was informed of the risk of bleeding, infection, uterine perforation and pain. She was counseled on the alternatives to endometrial biopsy and agrees to proceed.  Consents were signed today.    PROCEDURE:  TIME OUT PERFORMED.  The cervix was visualized with a speculum.  A single tooth tenaculum was placed on the anterior lip prior to the biopsy.  A sterile endometrial pipelle was passed without difficulty to a depth of 8.5 cm.  Endometrial tissue was obtained on 2 passes.    The specimen was placed in formalyn and sent to Pathology for histology evaluation. The patient tolerated the procedure well.    POST ENDOMETRIAL BIOPSY COUNSELING:  Manage post biopsy cramping with NSAIDs or Tylenol.  Expect spotting or light bleeding for a few days.  Report bleeding heavier than a period, fever > 101.0 F, worsening pain or a foul smelling vaginal  discharge.    PLAN:    Orders Placed This Encounter    POCT urine pregnancy    Specimen to Pathology, Ob/Gyn       Patient was counseled today on fibroids and treatment of endometrial mass.  Patient counseled on potential hormonal treatment options and surgical/procedure treatment options.    Risk and benefits of minimally invasive hysterectomy were discussed at length and in detail with the patient today.  Patient verbalized understanding of this discussion is considering definitive surgical management with a minimally invasive hysterectomy.    Alternatively, patient is counseled on the benefits of hysteroscopic resection of endometrial mass with postoperative menstrual calendar and hormonal management considerations.    Patient consider options/discuss option with family and will contact office for desired treatment plan.    Follow-up: DOMI Macdonald IV, MD

## 2022-01-20 ENCOUNTER — TELEPHONE (OUTPATIENT)
Dept: OBSTETRICS AND GYNECOLOGY | Facility: CLINIC | Age: 47
End: 2022-01-20
Payer: COMMERCIAL

## 2022-01-20 NOTE — TELEPHONE ENCOUNTER
----- Message from Jack Minor sent at 1/20/2022  1:23 PM CST -----  Please call pt she has a question [phone]    Questions regarding DLH and Hyst D&C, informed per Dr. Macdonald he call patient and discusss

## 2022-01-21 NOTE — TELEPHONE ENCOUNTER
Patient contacted via telephone at 1800 on 01/20/2021.    Risk and benefits to hysteroscopic resection of endometrial mass versus minimally invasive hysterectomy reviewed in detail today.  Patient verbalized understanding of this discussion.    Patient instructed to monitor for bleeding and bleeding continues, I am recommending an OCP taper or initiation of OCPs to stop bleeding (bleeding has been continuous for 6 weeks).    I am recommending iron therapy to help replenish iron stores.  I will check a CBC prior to patient going on ski trip to [facility].    Les Macdonald IV, MD

## 2022-01-25 ENCOUNTER — TELEPHONE (OUTPATIENT)
Dept: OBSTETRICS AND GYNECOLOGY | Facility: CLINIC | Age: 47
End: 2022-01-25
Payer: COMMERCIAL

## 2022-01-25 DIAGNOSIS — D25.1 FIBROIDS, INTRAMURAL: ICD-10-CM

## 2022-01-25 DIAGNOSIS — R93.89 THICKENED ENDOMETRIUM: ICD-10-CM

## 2022-01-25 DIAGNOSIS — N92.1 MENOMETRORRHAGIA: ICD-10-CM

## 2022-01-25 DIAGNOSIS — Z01.818 PREOPERATIVE TESTING: Primary | ICD-10-CM

## 2022-01-25 NOTE — TELEPHONE ENCOUNTER
----- Message from Candelaria Ferris sent at 1/25/2022 12:01 PM CST -----  Pt is returning call  Pt can be contacted at [phone]

## 2022-01-26 LAB
FINAL PATHOLOGIC DIAGNOSIS: NORMAL
Lab: NORMAL

## 2022-02-09 ENCOUNTER — TELEPHONE (OUTPATIENT)
Dept: OBSTETRICS AND GYNECOLOGY | Facility: CLINIC | Age: 47
End: 2022-02-09
Payer: COMMERCIAL

## 2022-02-09 NOTE — TELEPHONE ENCOUNTER
----- Message from Les Macdonald IV, MD sent at 2/7/2022  5:21 PM CST -----  Benign endometrial biopsy.  Contact patient with results.  Les Macdonald IV, MD

## 2022-03-08 ENCOUNTER — OFFICE VISIT (OUTPATIENT)
Dept: OBSTETRICS AND GYNECOLOGY | Facility: CLINIC | Age: 47
End: 2022-03-08
Payer: COMMERCIAL

## 2022-03-08 VITALS
BODY MASS INDEX: 24.34 KG/M2 | HEIGHT: 63 IN | SYSTOLIC BLOOD PRESSURE: 120 MMHG | DIASTOLIC BLOOD PRESSURE: 70 MMHG | WEIGHT: 137.38 LBS

## 2022-03-08 DIAGNOSIS — N92.1 MENOMETRORRHAGIA: Primary | ICD-10-CM

## 2022-03-08 DIAGNOSIS — N85.2 BULKY OR ENLARGED UTERUS: ICD-10-CM

## 2022-03-08 DIAGNOSIS — D25.1 FIBROIDS, INTRAMURAL: ICD-10-CM

## 2022-03-08 PROCEDURE — 4010F ACE/ARB THERAPY RXD/TAKEN: CPT | Mod: CPTII,S$GLB,, | Performed by: OBSTETRICS & GYNECOLOGY

## 2022-03-08 PROCEDURE — 1159F MED LIST DOCD IN RCRD: CPT | Mod: CPTII,S$GLB,, | Performed by: OBSTETRICS & GYNECOLOGY

## 2022-03-08 PROCEDURE — 3078F PR MOST RECENT DIASTOLIC BLOOD PRESSURE < 80 MM HG: ICD-10-PCS | Mod: CPTII,S$GLB,, | Performed by: OBSTETRICS & GYNECOLOGY

## 2022-03-08 PROCEDURE — 3074F PR MOST RECENT SYSTOLIC BLOOD PRESSURE < 130 MM HG: ICD-10-PCS | Mod: CPTII,S$GLB,, | Performed by: OBSTETRICS & GYNECOLOGY

## 2022-03-08 PROCEDURE — 4010F PR ACE/ARB THEARPY RXD/TAKEN: ICD-10-PCS | Mod: CPTII,S$GLB,, | Performed by: OBSTETRICS & GYNECOLOGY

## 2022-03-08 PROCEDURE — 1159F PR MEDICATION LIST DOCUMENTED IN MEDICAL RECORD: ICD-10-PCS | Mod: CPTII,S$GLB,, | Performed by: OBSTETRICS & GYNECOLOGY

## 2022-03-08 PROCEDURE — 99999 PR PBB SHADOW E&M-EST. PATIENT-LVL III: CPT | Mod: PBBFAC,,, | Performed by: OBSTETRICS & GYNECOLOGY

## 2022-03-08 PROCEDURE — 99214 PR OFFICE/OUTPT VISIT, EST, LEVL IV, 30-39 MIN: ICD-10-PCS | Mod: S$GLB,,, | Performed by: OBSTETRICS & GYNECOLOGY

## 2022-03-08 PROCEDURE — 99214 OFFICE O/P EST MOD 30 MIN: CPT | Mod: S$GLB,,, | Performed by: OBSTETRICS & GYNECOLOGY

## 2022-03-08 PROCEDURE — 3008F BODY MASS INDEX DOCD: CPT | Mod: CPTII,S$GLB,, | Performed by: OBSTETRICS & GYNECOLOGY

## 2022-03-08 PROCEDURE — 3074F SYST BP LT 130 MM HG: CPT | Mod: CPTII,S$GLB,, | Performed by: OBSTETRICS & GYNECOLOGY

## 2022-03-08 PROCEDURE — 3078F DIAST BP <80 MM HG: CPT | Mod: CPTII,S$GLB,, | Performed by: OBSTETRICS & GYNECOLOGY

## 2022-03-08 PROCEDURE — 99999 PR PBB SHADOW E&M-EST. PATIENT-LVL III: ICD-10-PCS | Mod: PBBFAC,,, | Performed by: OBSTETRICS & GYNECOLOGY

## 2022-03-08 PROCEDURE — 3008F PR BODY MASS INDEX (BMI) DOCUMENTED: ICD-10-PCS | Mod: CPTII,S$GLB,, | Performed by: OBSTETRICS & GYNECOLOGY

## 2022-03-08 RX ORDER — ACETAMINOPHEN 500 MG
1000 TABLET ORAL
Status: CANCELLED | OUTPATIENT
Start: 2022-03-14

## 2022-03-08 RX ORDER — SODIUM CHLORIDE 9 MG/ML
INJECTION, SOLUTION INTRAVENOUS CONTINUOUS
Status: CANCELLED | OUTPATIENT
Start: 2022-03-14

## 2022-03-08 NOTE — PROGRESS NOTES
"CC:  Heavy, irregular uterine bleeding    HPI : Nan Mendez is a 46 y.o.   patient who presents for evaluation and discussion of treatment options for heavy, irregular uterine bleeding secondary to fibroids. Patient reports a recent pelvic sonogram with Dr. Bk Horton at [facility] that revealed fibroids and an endometrial mass/suspected endometrial polyp.  Patient reports that her quality life is significantly disrupted by this continuous bleeding.  Patient reports that she has heavy menstrual cycles with clots and that this episode of bleeding has been occurring since mid December.   Patient reports significant quality of life issues associated with this heavy, irregular uterine bleeding.  Patient is considering definitive surgical management with a minimally invasive hysterectomy.    Patient's last menstrual period was 2022.     Chart reviewed    Past Medical History:   Diagnosis Date    Hodgkin's lymphoma 2006    Hypertension     Infertility, female      Past Surgical History:   Procedure Laterality Date    CARPAL TUNNEL RELEASE Bilateral      &      SECTION  2004     SECTION  2011     Family History   Problem Relation Age of Onset    Colon cancer Mother     Breast cancer Neg Hx     Ovarian cancer Neg Hx      Social History     Tobacco Use    Smoking status: Never Smoker    Smokeless tobacco: Never Used   Substance Use Topics    Alcohol use: Yes    Drug use: Never     OB History    Para Term  AB Living   2 2   2   2   SAB IAB Ectopic Multiple Live Births                  # Outcome Date GA Lbr Colton/2nd Weight Sex Delivery Anes PTL Lv   2  11    M CS-LTranv      1  04    F CS-LTranv          /70   Ht 5' 3" (1.6 m)   Wt 62.3 kg (137 lb 5.6 oz)   LMP 2022   BMI 24.33 kg/m²     ROS:  GENERAL: Feeling well overall.   SKIN: Denies rash or lesions.   HEAD: Denies head injury or " headache.   NODES: Denies enlarged lymph nodes.   CHEST: Denies chest pain or shortness of breath.   CARDIOVASCULAR: Denies palpitations or left sided chest pain.   ABDOMEN: No abdominal pain, constipation, diarrhea, nausea, vomiting or rectal bleeding.   URINARY: No frequency, dysuria, hematuria, or burning on urination.  REPRODUCTIVE: See HPI.   BREASTS: Denies pain, lumps, or nipple discharge.   HEMATOLOGIC: No easy bruisability.  MUSCULOSKELETAL: Denies joint pain or swelling.   NEUROLOGIC: Denies syncope or weakness.   PSYCHIATRIC: Denies depression, anxiety or mood swings.      PHYSICAL EXAM:  APPEARANCE: Well nourished, well developed, in no acute distress.  AFFECT: WNL, alert and oriented x 3  SKIN: No acne or hirsutism  NECK: Neck symmetric without masses or thyromegaly  NODES: No inguinal, cervical, axillary, or femoral lymph node enlargement  CHEST: Good respiratory effect  ABDOMEN: Soft.  No tenderness or masses.  No hepatosplenomegaly.  No hernias.  Pfannenstiel skin incision scar noted/healed.  PELVIC: Normal external genitalia without lesions.  Normal hair distribution.  Adequate perineal body, normal urethral meatus.  Vagina moist and well rugated without lesions or discharge.  Cervix pink, without lesions or tenderness.  Nabothian cyst new cervical os noted.  Anteriorly displaced cervix noted.  No significant cystocele or rectocele.  Bimanual exam shows uterus to be 12 week size, irregular contour consistent with fibroids, mobile and nontender.  Adnexa without masses or tenderness.    EXTREMITIES: No edema.    ASSESSMENT & PLAN:  1. Menometrorrhagia    2. Fibroids, intramural    3. Bulky or enlarged uterus      I have discussed the risks, benefits, indications, and alternatives of the procedure in detail.  The patient verbalizes her understanding.  All questions answered.  Consents signed.  The patient agrees to proceed to surgery scheduling.    Plan:  Robotic assisted hysterectomy with bilateral  salpingectomy (conservation of ovaries).    Les Macdonald IV, MD

## 2022-03-08 NOTE — H&P (VIEW-ONLY)
"CC:  Heavy, irregular uterine bleeding    HPI : Nan Mendez is a 46 y.o.   patient who presents for evaluation and discussion of treatment options for heavy, irregular uterine bleeding secondary to fibroids. Patient reports a recent pelvic sonogram with Dr. Bk Horton at [facility] that revealed fibroids and an endometrial mass/suspected endometrial polyp.  Patient reports that her quality life is significantly disrupted by this continuous bleeding.  Patient reports that she has heavy menstrual cycles with clots and that this episode of bleeding has been occurring since mid December.   Patient reports significant quality of life issues associated with this heavy, irregular uterine bleeding.  Patient is considering definitive surgical management with a minimally invasive hysterectomy.    Patient's last menstrual period was 2022.     Chart reviewed    Past Medical History:   Diagnosis Date    Hodgkin's lymphoma 2006    Hypertension     Infertility, female      Past Surgical History:   Procedure Laterality Date    CARPAL TUNNEL RELEASE Bilateral      &      SECTION  2004     SECTION  2011     Family History   Problem Relation Age of Onset    Colon cancer Mother     Breast cancer Neg Hx     Ovarian cancer Neg Hx      Social History     Tobacco Use    Smoking status: Never Smoker    Smokeless tobacco: Never Used   Substance Use Topics    Alcohol use: Yes    Drug use: Never     OB History    Para Term  AB Living   2 2   2   2   SAB IAB Ectopic Multiple Live Births                  # Outcome Date GA Lbr Colton/2nd Weight Sex Delivery Anes PTL Lv   2  11    M CS-LTranv      1  04    F CS-LTranv          /70   Ht 5' 3" (1.6 m)   Wt 62.3 kg (137 lb 5.6 oz)   LMP 2022   BMI 24.33 kg/m²     ROS:  GENERAL: Feeling well overall.   SKIN: Denies rash or lesions.   HEAD: Denies head injury or " headache.   NODES: Denies enlarged lymph nodes.   CHEST: Denies chest pain or shortness of breath.   CARDIOVASCULAR: Denies palpitations or left sided chest pain.   ABDOMEN: No abdominal pain, constipation, diarrhea, nausea, vomiting or rectal bleeding.   URINARY: No frequency, dysuria, hematuria, or burning on urination.  REPRODUCTIVE: See HPI.   BREASTS: Denies pain, lumps, or nipple discharge.   HEMATOLOGIC: No easy bruisability.  MUSCULOSKELETAL: Denies joint pain or swelling.   NEUROLOGIC: Denies syncope or weakness.   PSYCHIATRIC: Denies depression, anxiety or mood swings.      PHYSICAL EXAM:  APPEARANCE: Well nourished, well developed, in no acute distress.  AFFECT: WNL, alert and oriented x 3  SKIN: No acne or hirsutism  NECK: Neck symmetric without masses or thyromegaly  NODES: No inguinal, cervical, axillary, or femoral lymph node enlargement  CHEST: Good respiratory effect  ABDOMEN: Soft.  No tenderness or masses.  No hepatosplenomegaly.  No hernias.  Pfannenstiel skin incision scar noted/healed.  PELVIC: Normal external genitalia without lesions.  Normal hair distribution.  Adequate perineal body, normal urethral meatus.  Vagina moist and well rugated without lesions or discharge.  Cervix pink, without lesions or tenderness.  Nabothian cyst new cervical os noted.  Anteriorly displaced cervix noted.  No significant cystocele or rectocele.  Bimanual exam shows uterus to be 12 week size, irregular contour consistent with fibroids, mobile and nontender.  Adnexa without masses or tenderness.    EXTREMITIES: No edema.    ASSESSMENT & PLAN:  1. Menometrorrhagia    2. Fibroids, intramural    3. Bulky or enlarged uterus      I have discussed the risks, benefits, indications, and alternatives of the procedure in detail.  The patient verbalizes her understanding.  All questions answered.  Consents signed.  The patient agrees to proceed to surgery scheduling.    Plan:  Robotic assisted hysterectomy with bilateral  salpingectomy (conservation of ovaries).    Les Macdonald IV, MD

## 2022-03-11 ENCOUNTER — ANESTHESIA EVENT (OUTPATIENT)
Dept: SURGERY | Facility: OTHER | Age: 47
End: 2022-03-11
Payer: COMMERCIAL

## 2022-03-11 ENCOUNTER — HOSPITAL ENCOUNTER (OUTPATIENT)
Dept: PREADMISSION TESTING | Facility: OTHER | Age: 47
Discharge: HOME | End: 2022-03-11
Attending: OBSTETRICS & GYNECOLOGY
Payer: COMMERCIAL

## 2022-03-11 VITALS
HEART RATE: 78 BPM | DIASTOLIC BLOOD PRESSURE: 70 MMHG | BODY MASS INDEX: 24.45 KG/M2 | OXYGEN SATURATION: 99 % | TEMPERATURE: 98 F | SYSTOLIC BLOOD PRESSURE: 130 MMHG | HEIGHT: 63 IN | WEIGHT: 138 LBS | RESPIRATION RATE: 16 BRPM

## 2022-03-11 DIAGNOSIS — Z01.818 PREOP TESTING: Primary | ICD-10-CM

## 2022-03-11 LAB
ABO + RH BLD: NORMAL
BASOPHILS # BLD AUTO: 0.05 K/UL (ref 0–0.2)
BASOPHILS NFR BLD: 0.8 % (ref 0–1.9)
BLD GP AB SCN CELLS X3 SERPL QL: NORMAL
DIFFERENTIAL METHOD: ABNORMAL
EOSINOPHIL # BLD AUTO: 0.1 K/UL (ref 0–0.5)
EOSINOPHIL NFR BLD: 1.7 % (ref 0–8)
ERYTHROCYTE [DISTWIDTH] IN BLOOD BY AUTOMATED COUNT: 15.5 % (ref 11.5–14.5)
HCT VFR BLD AUTO: 43.5 % (ref 37–48.5)
HGB BLD-MCNC: 13.8 G/DL (ref 12–16)
IMM GRANULOCYTES # BLD AUTO: 0.02 K/UL (ref 0–0.04)
IMM GRANULOCYTES NFR BLD AUTO: 0.3 % (ref 0–0.5)
LYMPHOCYTES # BLD AUTO: 1.7 K/UL (ref 1–4.8)
LYMPHOCYTES NFR BLD: 26.4 % (ref 18–48)
MCH RBC QN AUTO: 24.1 PG (ref 27–31)
MCHC RBC AUTO-ENTMCNC: 31.7 G/DL (ref 32–36)
MCV RBC AUTO: 76 FL (ref 82–98)
MONOCYTES # BLD AUTO: 0.4 K/UL (ref 0.3–1)
MONOCYTES NFR BLD: 6.8 % (ref 4–15)
NEUTROPHILS # BLD AUTO: 4.2 K/UL (ref 1.8–7.7)
NEUTROPHILS NFR BLD: 64 % (ref 38–73)
NRBC BLD-RTO: 0 /100 WBC
PLATELET # BLD AUTO: 317 K/UL (ref 150–450)
PMV BLD AUTO: 9.8 FL (ref 9.2–12.9)
RBC # BLD AUTO: 5.73 M/UL (ref 4–5.4)
WBC # BLD AUTO: 6.51 K/UL (ref 3.9–12.7)

## 2022-03-11 PROCEDURE — 86900 BLOOD TYPING SEROLOGIC ABO: CPT | Performed by: ANESTHESIOLOGY

## 2022-03-11 PROCEDURE — 85025 COMPLETE CBC W/AUTO DIFF WBC: CPT | Performed by: ANESTHESIOLOGY

## 2022-03-11 PROCEDURE — 86850 RBC ANTIBODY SCREEN: CPT | Performed by: ANESTHESIOLOGY

## 2022-03-11 PROCEDURE — 36415 COLL VENOUS BLD VENIPUNCTURE: CPT | Performed by: ANESTHESIOLOGY

## 2022-03-11 RX ORDER — LIDOCAINE HYDROCHLORIDE 10 MG/ML
0.5 INJECTION, SOLUTION EPIDURAL; INFILTRATION; INTRACAUDAL; PERINEURAL ONCE
Status: CANCELLED | OUTPATIENT
Start: 2022-03-11 | End: 2022-03-11

## 2022-03-11 RX ORDER — CHOLECALCIFEROL (VITAMIN D3) 25 MCG
1000 TABLET ORAL DAILY
COMMUNITY
End: 2023-07-31

## 2022-03-11 RX ORDER — SODIUM CHLORIDE, SODIUM LACTATE, POTASSIUM CHLORIDE, CALCIUM CHLORIDE 600; 310; 30; 20 MG/100ML; MG/100ML; MG/100ML; MG/100ML
INJECTION, SOLUTION INTRAVENOUS CONTINUOUS
Status: CANCELLED | OUTPATIENT
Start: 2022-03-11

## 2022-03-11 RX ORDER — ACETAMINOPHEN 500 MG
1000 TABLET ORAL
Status: CANCELLED | OUTPATIENT
Start: 2022-03-11 | End: 2022-03-11

## 2022-03-11 NOTE — ANESTHESIA PREPROCEDURE EVALUATION
03/11/2022  Nan Mendez is a 46 y.o., female.      Pre-op Assessment    I have reviewed the Patient Summary Reports.     I have reviewed the Nursing Notes. I have reviewed the NPO Status.   I have reviewed the Medications.     Review of Systems  Anesthesia Hx:  No previous Anesthesia  History of prior surgery of interest to airway management or planning: Denies Family Hx of Anesthesia complications.   Denies Personal Hx of Anesthesia complications.   Social:  Non-Smoker    Hematology/Oncology:  Hematology Normal   Oncology Normal   Oncology Comments: Lymphoma 2006 tx w Chemo done well since     EENT/Dental:EENT/Dental Normal   Cardiovascular:   Exercise tolerance: good Hypertension, well controlled    Pulmonary:  Pulmonary Normal    Renal/:  Renal/ Normal     Hepatic/GI:  Hepatic/GI Normal    Musculoskeletal:  Musculoskeletal Normal    Neurological:  Neurology Normal    Endocrine:   Hypothyroidism    Dermatological:  Skin Normal    Psych:  Psychiatric Normal           Physical Exam  General: Well nourished, Cooperative, Alert and Oriented    Airway:  Mallampati: I   Mouth Opening: Normal  TM Distance: Normal  Tongue: Normal  Neck ROM: Normal ROM    Dental:  Intact, Caps / Implants        Anesthesia Plan  Type of Anesthesia, risks & benefits discussed:    Anesthesia Type: Gen ETT  Intra-op Monitoring Plan: Standard ASA Monitors  Post Op Pain Control Plan: multimodal analgesia  Induction:  IV  Airway Plan: Video, Post-Induction  Informed Consent: Informed consent signed with the Patient and all parties understand the risks and agree with anesthesia plan.  All questions answered.   ASA Score: 2  Day of Surgery Review of History & Physical: H&P Update referred to the surgeon/provider.  Anesthesia Plan Notes: CBC and T and S today    Ready For Surgery From Anesthesia Perspective.     .

## 2022-03-11 NOTE — DISCHARGE INSTRUCTIONS
Information to Prepare you for your Surgery    PRE-ADMIT TESTING -  [phone]    [address] [facility]          Your surgery has been scheduled at [facility]. We are pleased to have the opportunity to serve you. For Further Information please call [phone].    On the day of surgery please report to the Information Desk on the 1st floor.    CONTACT YOUR PHYSICIAN'S OFFICE THE DAY PRIOR TO YOUR SURGERY TO OBTAIN YOUR ARRIVAL TIME.     The evening before surgery do not eat anything after 9 p.m. ( this includes hard candy, chewing gum and mints).  You may only have GATORADE, POWERADE AND WATER  from 9 p.m. until you leave your home.   DO NOT DRINK ANY LIQUIDS ON THE WAY TO THE HOSPITAL.      Why does your anesthesiologist allow you to drink Gatorade/Powerade before surgery?  Gatorade/Powerade helps to increase your comfort before surgery and to decrease your nausea after surgery. The carbohydrates in Gatorade/Powerade help reduce your body's stress response to surgery.  If you are a diabetic-drink only water prior to surgery.      Current Visitor policy(12/27/2021) - Patients may have 2 visitors pre and post procedure. Only 2 visitors will be allowed in the Surgical building with the patient.     SPECIAL MEDICATION INSTRUCTIONS: TAKE medications checked off by the Anesthesiologist on your Medication List.    Angiogram Patients: Take medications as instructed by your physician, including aspirin.     Surgery Patients:    If you take ASPIRIN - Your PHYSICIAN/SURGEON will need to inform you IF/OR when you need to stop taking aspirin prior to your surgery.     Do Not take any medications containing IBUPROFEN.    Do Not Wear any make-up (especially eye make-up) to surgery. Please remove any false eyelashes or eyelash extensions. If you arrive the day of surgery with makeup/eyelashes on you will be required to remove prior to surgery. (There is a risk of corneal  abrasions if eye makeup/eyelash extensions are not removed)      Leave all valuables at home.   Do Not wear any jewelry or watches, including any metal in body piercings. Jewelry must be removed prior to coming to the hospital.  There is a possibility that rings that are unable to be removed may be cut off if they are on the surgical extremity.    Please remove all hair extensions, wigs, clips and any other metal accessories/ ornaments from your hair.  These items may pose a flammable/fire risk in Surgery and must be removed.    Do not shave your surgical area at least 5 days prior to your surgery. The surgical prep will be performed at the hospital according to Infection Control regulations.    Contact Lens must be removed before surgery. Either do not wear the contact lens or bring a case and solution for storage.  Please bring a container for eyeglasses or dentures as required.  Bring any paperwork your physician has provided, such as consent forms,  history and physicals, doctor's orders, etc.   Bring comfortable clothes that are loose fitting to wear upon discharge. Take into consideration the type of surgery being performed.  Maintain your diet as advised per your physician the day prior to surgery.      Adequate rest the night before surgery is advised.   Park in the Parking lot behind the hospital or in the [employer] [facility] across the street from the parking lot. Parking is complimentary.  If you will be discharged the same day as your procedure, please arrange for a responsible adult to drive you home or to accompany you if traveling by taxi.   YOU WILL NOT BE PERMITTED TO DRIVE OR TO LEAVE THE HOSPITAL ALONE AFTER SURGERY.   If you are being discharged the same day, it is strongly recommended that you arrange for someone to remain with you for the first 24 hrs following your surgery.    The Surgeon will speak to your family/visitor after your surgery regarding the outcome of your surgery and post op  care.  The Surgeon may speak to you after your surgery, but there is a possibility you may not remember the details.  Please check with your family members regarding the conversation with the Surgeon.    We strongly recommend whoever is bringing you home be present for discharge instructions.  This will ensure a thorough understanding for your post op home care.    ALL CHILDREN MUST ALWAYS BE ACCOMPANIED BY AN ADULT.    Visitors-Refer to current Visitor policy handouts.    Thank you for your cooperation.  The Staff of [facility].            Bathing Instructions with Hibiclens    Shower the evening before and morning of your procedure with Hibiclens:  Wash your face with water and your regular face wash/soap  Apply Hibiclens directly on your skin or on a wet washcloth and wash gently. When showering: Move away from the shower stream when applying Hibiclens to avoid rinsing off too soon.  Rinse thoroughly with warm water  Do not dilute Hibiclens        Dry off as usual, do not use any deodorant, powder, body lotions, perfume, after shave or cologne.

## 2022-03-14 ENCOUNTER — HOSPITAL ENCOUNTER (OUTPATIENT)
Facility: OTHER | Age: 47
Discharge: HOME | End: 2022-03-14
Attending: OBSTETRICS & GYNECOLOGY | Admitting: OBSTETRICS & GYNECOLOGY
Payer: COMMERCIAL

## 2022-03-14 ENCOUNTER — ANESTHESIA (OUTPATIENT)
Dept: SURGERY | Facility: OTHER | Age: 47
End: 2022-03-14
Payer: COMMERCIAL

## 2022-03-14 VITALS
DIASTOLIC BLOOD PRESSURE: 72 MMHG | BODY MASS INDEX: 24.45 KG/M2 | OXYGEN SATURATION: 97 % | TEMPERATURE: 98 F | HEIGHT: 63 IN | RESPIRATION RATE: 16 BRPM | SYSTOLIC BLOOD PRESSURE: 114 MMHG | WEIGHT: 138 LBS | HEART RATE: 100 BPM

## 2022-03-14 DIAGNOSIS — D25.1 FIBROIDS, INTRAMURAL: ICD-10-CM

## 2022-03-14 DIAGNOSIS — Z90.710 S/P LAPAROSCOPIC HYSTERECTOMY: Primary | ICD-10-CM

## 2022-03-14 DIAGNOSIS — N85.2 BULKY OR ENLARGED UTERUS: ICD-10-CM

## 2022-03-14 DIAGNOSIS — N92.1 MENOMETRORRHAGIA: ICD-10-CM

## 2022-03-14 LAB
B-HCG UR QL: NEGATIVE
CTP QC/QA: YES
POCT GLUCOSE: 94 MG/DL (ref 70–110)

## 2022-03-14 PROCEDURE — 88307 TISSUE EXAM BY PATHOLOGIST: CPT | Performed by: PATHOLOGY

## 2022-03-14 PROCEDURE — 88307 TISSUE EXAM BY PATHOLOGIST: CPT | Mod: 26,,, | Performed by: PATHOLOGY

## 2022-03-14 PROCEDURE — 25000003 PHARM REV CODE 250: Performed by: NURSE ANESTHETIST, CERTIFIED REGISTERED

## 2022-03-14 PROCEDURE — 58573 TLH W/T/O UTERUS OVER 250 G: CPT | Mod: 22,AS,, | Performed by: NURSE PRACTITIONER

## 2022-03-14 PROCEDURE — 88307 PR  SURG PATH,LEVEL V: ICD-10-PCS | Mod: 26,,, | Performed by: PATHOLOGY

## 2022-03-14 PROCEDURE — 58573 TLH W/T/O UTERUS OVER 250 G: CPT | Mod: 22,,, | Performed by: OBSTETRICS & GYNECOLOGY

## 2022-03-14 PROCEDURE — 88305 TISSUE EXAM BY PATHOLOGIST: CPT | Performed by: PATHOLOGY

## 2022-03-14 PROCEDURE — 63600175 PHARM REV CODE 636 W HCPCS

## 2022-03-14 PROCEDURE — 58662 PR LAP,FULGURATE/EXCISE LESIONS: ICD-10-PCS | Mod: AS,51,, | Performed by: NURSE PRACTITIONER

## 2022-03-14 PROCEDURE — 25000003 PHARM REV CODE 250

## 2022-03-14 PROCEDURE — 88305 TISSUE EXAM BY PATHOLOGIST: CPT | Mod: 26,,, | Performed by: PATHOLOGY

## 2022-03-14 PROCEDURE — 37000008 HC ANESTHESIA 1ST 15 MINUTES: Performed by: OBSTETRICS & GYNECOLOGY

## 2022-03-14 PROCEDURE — 36000712 HC OR TIME LEV V 1ST 15 MIN: Performed by: OBSTETRICS & GYNECOLOGY

## 2022-03-14 PROCEDURE — 37000009 HC ANESTHESIA EA ADD 15 MINS: Performed by: OBSTETRICS & GYNECOLOGY

## 2022-03-14 PROCEDURE — 58573 PR LAPAROSCOPY TOT HYSTERECTOMY UTERUS >250 GRAM W TUBE/OVARY: ICD-10-PCS | Mod: 22,,, | Performed by: OBSTETRICS & GYNECOLOGY

## 2022-03-14 PROCEDURE — 25000003 PHARM REV CODE 250: Performed by: ANESTHESIOLOGY

## 2022-03-14 PROCEDURE — 81025 URINE PREGNANCY TEST: CPT

## 2022-03-14 PROCEDURE — 63600175 PHARM REV CODE 636 W HCPCS: Performed by: NURSE ANESTHETIST, CERTIFIED REGISTERED

## 2022-03-14 PROCEDURE — 71000015 HC POSTOP RECOV 1ST HR: Performed by: OBSTETRICS & GYNECOLOGY

## 2022-03-14 PROCEDURE — 58662 LAPAROSCOPY EXCISE LESIONS: CPT | Mod: 51,,, | Performed by: OBSTETRICS & GYNECOLOGY

## 2022-03-14 PROCEDURE — 58662 LAPAROSCOPY EXCISE LESIONS: CPT | Mod: AS,51,, | Performed by: NURSE PRACTITIONER

## 2022-03-14 PROCEDURE — 27201423 OPTIME MED/SURG SUP & DEVICES STERILE SUPPLY: Performed by: OBSTETRICS & GYNECOLOGY

## 2022-03-14 PROCEDURE — 58573 PR LAPAROSCOPY TOT HYSTERECTOMY UTERUS >250 GRAM W TUBE/OVARY: ICD-10-PCS | Mod: 22,AS,, | Performed by: NURSE PRACTITIONER

## 2022-03-14 PROCEDURE — 71000033 HC RECOVERY, INTIAL HOUR: Performed by: OBSTETRICS & GYNECOLOGY

## 2022-03-14 PROCEDURE — 71000016 HC POSTOP RECOV ADDL HR: Performed by: OBSTETRICS & GYNECOLOGY

## 2022-03-14 PROCEDURE — 63600175 PHARM REV CODE 636 W HCPCS: Performed by: ANESTHESIOLOGY

## 2022-03-14 PROCEDURE — 88305 TISSUE EXAM BY PATHOLOGIST: ICD-10-PCS | Mod: 26,,, | Performed by: PATHOLOGY

## 2022-03-14 PROCEDURE — 36000713 HC OR TIME LEV V EA ADD 15 MIN: Performed by: OBSTETRICS & GYNECOLOGY

## 2022-03-14 PROCEDURE — 58662 PR LAP,FULGURATE/EXCISE LESIONS: ICD-10-PCS | Mod: 51,,, | Performed by: OBSTETRICS & GYNECOLOGY

## 2022-03-14 RX ORDER — PROPOFOL 10 MG/ML
VIAL (ML) INTRAVENOUS
Status: DISCONTINUED | OUTPATIENT
Start: 2022-03-14 | End: 2022-03-14

## 2022-03-14 RX ORDER — SODIUM CHLORIDE, SODIUM LACTATE, POTASSIUM CHLORIDE, CALCIUM CHLORIDE 600; 310; 30; 20 MG/100ML; MG/100ML; MG/100ML; MG/100ML
INJECTION, SOLUTION INTRAVENOUS CONTINUOUS
Status: DISCONTINUED | OUTPATIENT
Start: 2022-03-14 | End: 2022-03-14 | Stop reason: HOSPADM

## 2022-03-14 RX ORDER — PROCHLORPERAZINE EDISYLATE 5 MG/ML
5 INJECTION INTRAMUSCULAR; INTRAVENOUS EVERY 30 MIN PRN
Status: DISCONTINUED | OUTPATIENT
Start: 2022-03-14 | End: 2022-03-14 | Stop reason: HOSPADM

## 2022-03-14 RX ORDER — ROCURONIUM BROMIDE 10 MG/ML
INJECTION, SOLUTION INTRAVENOUS
Status: DISCONTINUED | OUTPATIENT
Start: 2022-03-14 | End: 2022-03-14

## 2022-03-14 RX ORDER — DIPHENHYDRAMINE HYDROCHLORIDE 50 MG/ML
12.5 INJECTION INTRAMUSCULAR; INTRAVENOUS EVERY 30 MIN PRN
Status: DISCONTINUED | OUTPATIENT
Start: 2022-03-14 | End: 2022-03-14 | Stop reason: HOSPADM

## 2022-03-14 RX ORDER — IBUPROFEN 800 MG/1
800 TABLET ORAL EVERY 8 HOURS PRN
Qty: 30 TABLET | Refills: 0 | Status: SHIPPED | OUTPATIENT
Start: 2022-03-14 | End: 2022-04-19

## 2022-03-14 RX ORDER — DEXAMETHASONE SODIUM PHOSPHATE 4 MG/ML
INJECTION, SOLUTION INTRA-ARTICULAR; INTRALESIONAL; INTRAMUSCULAR; INTRAVENOUS; SOFT TISSUE
Status: DISCONTINUED | OUTPATIENT
Start: 2022-03-14 | End: 2022-03-14

## 2022-03-14 RX ORDER — LIDOCAINE HYDROCHLORIDE 10 MG/ML
0.5 INJECTION, SOLUTION EPIDURAL; INFILTRATION; INTRACAUDAL; PERINEURAL ONCE
Status: DISCONTINUED | OUTPATIENT
Start: 2022-03-14 | End: 2022-03-14 | Stop reason: HOSPADM

## 2022-03-14 RX ORDER — SODIUM CHLORIDE 9 MG/ML
INJECTION, SOLUTION INTRAVENOUS CONTINUOUS
Status: DISCONTINUED | OUTPATIENT
Start: 2022-03-14 | End: 2022-03-14 | Stop reason: HOSPADM

## 2022-03-14 RX ORDER — OXYCODONE HYDROCHLORIDE 5 MG/1
5 TABLET ORAL
Status: DISCONTINUED | OUTPATIENT
Start: 2022-03-14 | End: 2022-03-14 | Stop reason: HOSPADM

## 2022-03-14 RX ORDER — DIPHENHYDRAMINE HCL 25 MG
25 CAPSULE ORAL EVERY 4 HOURS PRN
Status: DISCONTINUED | OUTPATIENT
Start: 2022-03-14 | End: 2022-03-14 | Stop reason: HOSPADM

## 2022-03-14 RX ORDER — THYROID 30 MG/1
60 TABLET ORAL
Status: DISCONTINUED | OUTPATIENT
Start: 2022-03-14 | End: 2022-03-14

## 2022-03-14 RX ORDER — KETOROLAC TROMETHAMINE 30 MG/ML
INJECTION, SOLUTION INTRAMUSCULAR; INTRAVENOUS
Status: DISCONTINUED | OUTPATIENT
Start: 2022-03-14 | End: 2022-03-14

## 2022-03-14 RX ORDER — CEFAZOLIN SODIUM 1 G/3ML
2 INJECTION, POWDER, FOR SOLUTION INTRAMUSCULAR; INTRAVENOUS
Status: COMPLETED | OUTPATIENT
Start: 2022-03-14 | End: 2022-03-14

## 2022-03-14 RX ORDER — PROCHLORPERAZINE EDISYLATE 5 MG/ML
5 INJECTION INTRAMUSCULAR; INTRAVENOUS EVERY 6 HOURS PRN
Status: DISCONTINUED | OUTPATIENT
Start: 2022-03-14 | End: 2022-03-14 | Stop reason: HOSPADM

## 2022-03-14 RX ORDER — ONDANSETRON 2 MG/ML
INJECTION INTRAMUSCULAR; INTRAVENOUS
Status: DISCONTINUED | OUTPATIENT
Start: 2022-03-14 | End: 2022-03-14

## 2022-03-14 RX ORDER — HYDROCODONE BITARTRATE AND ACETAMINOPHEN 5; 325 MG/1; MG/1
1 TABLET ORAL EVERY 4 HOURS PRN
Status: DISCONTINUED | OUTPATIENT
Start: 2022-03-14 | End: 2022-03-14 | Stop reason: HOSPADM

## 2022-03-14 RX ORDER — KETAMINE HCL IN 0.9 % NACL 50 MG/5 ML
SYRINGE (ML) INTRAVENOUS
Status: DISCONTINUED | OUTPATIENT
Start: 2022-03-14 | End: 2022-03-14

## 2022-03-14 RX ORDER — ONDANSETRON 8 MG/1
8 TABLET, ORALLY DISINTEGRATING ORAL EVERY 8 HOURS PRN
Status: DISCONTINUED | OUTPATIENT
Start: 2022-03-14 | End: 2022-03-14 | Stop reason: HOSPADM

## 2022-03-14 RX ORDER — MUPIROCIN 20 MG/G
OINTMENT TOPICAL
Status: CANCELLED | OUTPATIENT
Start: 2022-03-14

## 2022-03-14 RX ORDER — HYDROMORPHONE HYDROCHLORIDE 2 MG/ML
INJECTION, SOLUTION INTRAMUSCULAR; INTRAVENOUS; SUBCUTANEOUS
Status: DISCONTINUED | OUTPATIENT
Start: 2022-03-14 | End: 2022-03-14

## 2022-03-14 RX ORDER — AMLODIPINE BESYLATE 5 MG/1
10 TABLET ORAL DAILY
Status: DISCONTINUED | OUTPATIENT
Start: 2022-03-14 | End: 2022-03-14

## 2022-03-14 RX ORDER — MIDAZOLAM HYDROCHLORIDE 1 MG/ML
INJECTION INTRAMUSCULAR; INTRAVENOUS
Status: DISCONTINUED | OUTPATIENT
Start: 2022-03-14 | End: 2022-03-14

## 2022-03-14 RX ORDER — SODIUM CHLORIDE 0.9 % (FLUSH) 0.9 %
3 SYRINGE (ML) INJECTION
Status: DISCONTINUED | OUTPATIENT
Start: 2022-03-14 | End: 2022-03-14 | Stop reason: HOSPADM

## 2022-03-14 RX ORDER — ACETAMINOPHEN 500 MG
1000 TABLET ORAL
Status: COMPLETED | OUTPATIENT
Start: 2022-03-14 | End: 2022-03-14

## 2022-03-14 RX ORDER — DIPHENHYDRAMINE HYDROCHLORIDE 50 MG/ML
25 INJECTION INTRAMUSCULAR; INTRAVENOUS EVERY 4 HOURS PRN
Status: DISCONTINUED | OUTPATIENT
Start: 2022-03-14 | End: 2022-03-14 | Stop reason: HOSPADM

## 2022-03-14 RX ORDER — FENTANYL CITRATE 50 UG/ML
INJECTION, SOLUTION INTRAMUSCULAR; INTRAVENOUS
Status: DISCONTINUED | OUTPATIENT
Start: 2022-03-14 | End: 2022-03-14

## 2022-03-14 RX ORDER — MEPERIDINE HYDROCHLORIDE 25 MG/ML
12.5 INJECTION INTRAMUSCULAR; INTRAVENOUS; SUBCUTANEOUS ONCE AS NEEDED
Status: DISCONTINUED | OUTPATIENT
Start: 2022-03-14 | End: 2022-03-14 | Stop reason: HOSPADM

## 2022-03-14 RX ORDER — HYDROMORPHONE HYDROCHLORIDE 2 MG/ML
0.4 INJECTION, SOLUTION INTRAMUSCULAR; INTRAVENOUS; SUBCUTANEOUS EVERY 5 MIN PRN
Status: DISCONTINUED | OUTPATIENT
Start: 2022-03-14 | End: 2022-03-14 | Stop reason: HOSPADM

## 2022-03-14 RX ORDER — ACETAMINOPHEN 500 MG
1000 TABLET ORAL
Status: DISCONTINUED | OUTPATIENT
Start: 2022-03-14 | End: 2022-03-14

## 2022-03-14 RX ORDER — OXYCODONE AND ACETAMINOPHEN 5; 325 MG/1; MG/1
1 TABLET ORAL EVERY 4 HOURS PRN
Qty: 30 TABLET | Refills: 0 | Status: SHIPPED | OUTPATIENT
Start: 2022-03-14 | End: 2022-04-19

## 2022-03-14 RX ORDER — LIDOCAINE HCL/PF 100 MG/5ML
SYRINGE (ML) INTRAVENOUS
Status: DISCONTINUED | OUTPATIENT
Start: 2022-03-14 | End: 2022-03-14

## 2022-03-14 RX ORDER — EPHEDRINE SULFATE 50 MG/ML
INJECTION, SOLUTION INTRAVENOUS
Status: DISCONTINUED | OUTPATIENT
Start: 2022-03-14 | End: 2022-03-14

## 2022-03-14 RX ADMIN — HYDROMORPHONE HYDROCHLORIDE 0.2 MG: 2 INJECTION INTRAMUSCULAR; INTRAVENOUS; SUBCUTANEOUS at 09:03

## 2022-03-14 RX ADMIN — KETOROLAC TROMETHAMINE 30 MG: 30 INJECTION, SOLUTION INTRAMUSCULAR; INTRAVENOUS at 10:03

## 2022-03-14 RX ADMIN — ROCURONIUM BROMIDE 20 MG: 10 INJECTION, SOLUTION INTRAVENOUS at 09:03

## 2022-03-14 RX ADMIN — ROCURONIUM BROMIDE 20 MG: 10 INJECTION, SOLUTION INTRAVENOUS at 08:03

## 2022-03-14 RX ADMIN — SODIUM CHLORIDE, SODIUM LACTATE, POTASSIUM CHLORIDE, AND CALCIUM CHLORIDE: 600; 310; 30; 20 INJECTION, SOLUTION INTRAVENOUS at 06:03

## 2022-03-14 RX ADMIN — EPHEDRINE SULFATE 10 MG: 50 INJECTION INTRAVENOUS at 08:03

## 2022-03-14 RX ADMIN — ONDANSETRON 4 MG: 2 INJECTION INTRAMUSCULAR; INTRAVENOUS at 10:03

## 2022-03-14 RX ADMIN — ROCURONIUM BROMIDE 10 MG: 10 INJECTION, SOLUTION INTRAVENOUS at 10:03

## 2022-03-14 RX ADMIN — ACETAMINOPHEN 1000 MG: 500 TABLET, FILM COATED ORAL at 06:03

## 2022-03-14 RX ADMIN — EPHEDRINE SULFATE 10 MG: 50 INJECTION INTRAVENOUS at 09:03

## 2022-03-14 RX ADMIN — PROPOFOL 200 MG: 10 INJECTION, EMULSION INTRAVENOUS at 07:03

## 2022-03-14 RX ADMIN — OXYCODONE 5 MG: 5 TABLET ORAL at 10:03

## 2022-03-14 RX ADMIN — GLYCOPYRROLATE 0.2 MG: 0.2 INJECTION, SOLUTION INTRAMUSCULAR; INTRAVITREAL at 07:03

## 2022-03-14 RX ADMIN — HYDROMORPHONE HYDROCHLORIDE 0.2 MG: 2 INJECTION INTRAMUSCULAR; INTRAVENOUS; SUBCUTANEOUS at 10:03

## 2022-03-14 RX ADMIN — Medication 10 MG: at 09:03

## 2022-03-14 RX ADMIN — DEXAMETHASONE SODIUM PHOSPHATE 6 MG: 4 INJECTION INTRA-ARTICULAR; INTRALESIONAL; INTRAMUSCULAR; INTRAVENOUS; SOFT TISSUE at 07:03

## 2022-03-14 RX ADMIN — ROCURONIUM BROMIDE 50 MG: 10 INJECTION, SOLUTION INTRAVENOUS at 07:03

## 2022-03-14 RX ADMIN — LIDOCAINE HYDROCHLORIDE 60 MG: 20 INJECTION, SOLUTION INTRAVENOUS at 07:03

## 2022-03-14 RX ADMIN — SODIUM CHLORIDE, SODIUM LACTATE, POTASSIUM CHLORIDE, AND CALCIUM CHLORIDE: 600; 310; 30; 20 INJECTION, SOLUTION INTRAVENOUS at 10:03

## 2022-03-14 RX ADMIN — Medication 25 MG: at 07:03

## 2022-03-14 RX ADMIN — EPHEDRINE SULFATE 10 MG: 50 INJECTION INTRAVENOUS at 10:03

## 2022-03-14 RX ADMIN — MIDAZOLAM HYDROCHLORIDE 2 MG: 1 INJECTION, SOLUTION INTRAMUSCULAR; INTRAVENOUS at 07:03

## 2022-03-14 RX ADMIN — ROCURONIUM BROMIDE 10 MG: 10 INJECTION, SOLUTION INTRAVENOUS at 09:03

## 2022-03-14 RX ADMIN — HYDROMORPHONE HYDROCHLORIDE 0.4 MG: 2 INJECTION INTRAMUSCULAR; INTRAVENOUS; SUBCUTANEOUS at 10:03

## 2022-03-14 RX ADMIN — SODIUM CHLORIDE, SODIUM LACTATE, POTASSIUM CHLORIDE, AND CALCIUM CHLORIDE: 600; 310; 30; 20 INJECTION, SOLUTION INTRAVENOUS at 08:03

## 2022-03-14 RX ADMIN — CEFAZOLIN 2 G: 330 INJECTION, POWDER, FOR SOLUTION INTRAMUSCULAR; INTRAVENOUS at 07:03

## 2022-03-14 RX ADMIN — FENTANYL CITRATE 100 MCG: 50 INJECTION, SOLUTION INTRAMUSCULAR; INTRAVENOUS at 07:03

## 2022-03-14 RX ADMIN — SUGAMMADEX 200 MG: 100 INJECTION, SOLUTION INTRAVENOUS at 10:03

## 2022-03-14 NOTE — ANESTHESIA PROCEDURE NOTES
Intubation    Date/Time: 3/14/2022 7:28 AM  Performed by: Sushma Dubois CRNA  Authorized by: Francine Nguyen MD     Intubation:     Induction:  Intravenous    Intubated:  Postinduction    Mask Ventilation:  Easy mask    Attempts:  1    Attempted By:  CRNA    Method of Intubation:  Video laryngoscopy    Blade:  Sullivan 3    Laryngeal View Grade: Grade I - full view of cords      Difficult Airway Encountered?: No      Complications:  None    Airway Device:  Oral endotracheal tube    Airway Device Size:  7.5    Style/Cuff Inflation:  Cuffed (inflated to minimal occlusive pressure)    Inflation Amount (mL):  4    Tube secured:  21    Secured at:  The lips    Placement Verified By:  Capnometry    Complicating Factors:  None    Findings Post-Intubation:  BS equal bilateral and atraumatic/condition of teeth unchanged

## 2022-03-14 NOTE — ANESTHESIA POSTPROCEDURE EVALUATION
Anesthesia Post Evaluation    Patient: Nan Mendez    Procedure(s) Performed: Procedure(s) (LRB):  XI ROBOTIC HYSTERECTOMY, LYSIS OF ADHESIONS, BILATERAL SALPHINGECTOMY AND RESECTION OF ENDOMETRIOSIS (N/A)    Final Anesthesia Type: general      Patient location during evaluation: PACU  Patient participation: Yes- Able to Participate  Level of consciousness: awake and alert  Post-procedure vital signs: reviewed and stable  Pain management: adequate  Airway patency: patent    PONV status at discharge: No PONV  Anesthetic complications: no      Cardiovascular status: blood pressure returned to baseline and stable  Respiratory status: unassisted, spontaneous ventilation and room air  Hydration status: euvolemic  Follow-up not needed.          Vitals Value Taken Time   /72 03/14/22 1230   Temp 36.9 °C (98.4 °F) 03/14/22 1046   Pulse 100 03/14/22 1230   Resp 16 03/14/22 1230   SpO2 97 % 03/14/22 1230         Event Time   Out of Recovery 11:24:42         Pain/Tamiko Score: Pain Rating Prior to Med Admin: 7 (3/14/2022 10:54 AM)  Pain Rating Post Med Admin: 2 (Patient sleeping but said pain is only achy and about a 2.) (3/14/2022 11:16 AM)  Tamiko Score: 10 (3/14/2022 12:30 PM)

## 2022-03-14 NOTE — INTERVAL H&P NOTE
The patient has been examined and the H&P has been reviewed:    I concur with the findings and no changes have occurred since H&P was written. Patient has taken amlodipine 10mg and thyroid hormone this AM. No changes in medical history. Patient understands and agrees to -[facility]. Consents signed in clinic with Dr. Macdonald. All questions answered.     Surgery risks, benefits and alternative options discussed and understood by patient/family.    There are no hospital problems to display for this patient.    Rekha Antoine MD  PGY 1  Obstetrics and Gynecology

## 2022-03-14 NOTE — OPERATIVE NOTE ADDENDUM
Certification of Assistant at Surgery       Surgery Date: 3/14/2022     Participating Surgeons:  Surgeon(s) and Role:     * Les Macdonald IV, MD - Primary     * Marva Smith MD - Resident - Assisting    Procedures:  Procedure(s) (LRB):  XI ROBOTIC HYSTERECTOMY, LYSIS OF ADHESIONS, BILATERAL SALPHINGECTOMY AND RESECTION OF ENDOMETRIOSIS (N/A)    Assistant Surgeon's Certification of Necessity:  I understand that section 1842 (b) (6) (d) of the Social Security Act generally prohibits Medicare Part B reasonable charge payment for the services of assistants at surgery in teaching hospitals when qualified residents are available to furnish such services. I certify that the services for which payment is claimed were medically necessary, and that no qualified resident was available to perform the services. I further understand that these services are subject to post-payment review by the Medicare carrier.      VARSHA Lopez    03/14/2022  10:58 AM

## 2022-03-14 NOTE — PLAN OF CARE
Voiding trial performed. Pt tolerated well. 300cc sterile water instilled into bladder via murillo. 250cc clear urine returned into hat.

## 2022-03-14 NOTE — OP NOTE
[facility], [place], operated by [facility]  Surgery Department  Operative Note    SUMMARY     Date of Procedure: 3/14/2022     Procedure: Procedure(s) (LRB):  XI ROBOTIC HYSTERECTOMY, LYSIS OF ADHESIONS, BILATERAL SALPHINGECTOMY AND RESECTION OF ENDOMETRIOSIS (N/A)     Surgeon(s) and Role:     * Les Macdonald IV, MD - Primary  LUIS Chacon (no resident or staff available to assist at bedside)     * Marva Smith MD - PGY4- Assist at console    Pre-Operative Diagnosis:   Fibroids, intramural [D25.1]  Thickened endometrium [R93.89]  Menometrorrhagia [N92.1]    Post-Operative Diagnosis: Post-Op Diagnosis Codes:     * Fibroids, intramural [D25.1]     * Thickened endometrium [R93.89]     * Menometrorrhagia [N92.1]    Additional diagnosis:  Pelvic endometriosis  Severe pelvic adhesions    Anesthesia: General endotracheal anesthesia    Technical Procedures Used:   1. Robotic assisted hysterectomy with bilateral salpingectomy with lysis of severe adhesions adding 1 hour to the procedure  2. Robotic assisted resection of endometriosis    Description of the Findings of the Procedure:   1. Uterus 12-14 week size with multiple fibroids.  Significant fibroids were noted bilaterally at the lower uterine segment/cervical junction.  2. Bilateral ovaries appeared normal.  3. Liver edge, gallbladder, and appendix visualized with no abnormality noted.  4. At the onset of the case there was some blood filled fluid in the cul-de-sac that was pre-existing prior to starting the procedure.  5. Bilateral ureters with normal, forward vermiculation/peristalsis postprocedure.  6. A contained morcellation was performed using a large [employer] bag using the EXCITE technique with a 10 blade.  The bag was noted to be intact post morcellation.  7. Excellent vaginal cuff closure was noted.  This was confirmed with speculum exam postprocedure.  8. There was no evidence of iatrogenic bladder injury.  A small area of serosal/scar potential  defect was noted and reapproximated using 2 0 Vicryl suture in interrupted fashion x4.  Prior to repair the bladder was filled with 200 cc of normal saline with no evidence of iatrogenic bladder injury.  See pictures below.    9. An omental adhesion was noted to the anterior abdominal wall in the presumed prior  delivery scar.  A small fascial defect was noted in this area.    Operative report:  Nan Mendez is a 46-year-old patient is suffering from the above conditions.  Risks, benefits, and alternatives to minimally invasive hysterectomy were discussed at length and in detail with the patient her preoperative visit.  Patient desired ovarian conservation.  Patient was taken to the operating room today which underwent successful general endotracheal anesthesia.  Patient was prepped and draped in the usual sterile fashion for abdominal/pelvic surgery.  Sterile placement of Arnett catheter was performed.  A SUNITA uterine manipulator with a KOH ring was placed without complication or difficulty.  After SUNITA placement attention was turned to the abdomen where Veress needle was placed through the umbilicus.  Correct placement was confirmed with the hanging drop test and low intra-abdominal pressures.  The abdomen and pelvis were insufflated with CO2 gas.  After adequate insufflation the robotic ports were placed.  A total 3 ports was used for this procedure.  One port was placed in the umbilicus.  The 2 operative ports were placed and in the mid lateral abdominal quadrants.  After port placement the patient was placed in deep Trendelenburg and the robot was docked.  General inspection was performed with the findings as reported above.  Liver edge and gallbladder visualized with no obvious abnormality.  Appendix appeared normal.  Uterus was noted to have multiple fibroids and significant bladder scarring (see pictures below).  The pelvis and cul-de-sac was noted to have pre-existing bloody fluid upon entry  into the abdomen.  Decision was made to proceed with minimally invasive hysterectomy.  An omental adhesion to the anterior abdominal wall was lysed without complication difficulty.  Bilateral round ligaments were identified cauterized and transected.  The retroperitoneal spaces were dissected bilaterally.  Extensive bladder adhesions were noted and lysis of adhesions were necessary to restore near normal anatomy.  There were significant fibroids bilaterally in the lower uterine segment and cervix.  These extended into the retroperitoneal space.  Extensive dissection of the retroperitoneal space was required to perform this procedure safely.  Bilateral fallopian tubes were transected off of the respective mesosalpinges.  An opening was created in the bilateral posterior/medial broad ligament.  This opening was extended parallel and inferior to the infundibulopelvic ligaments.  The bilateral utero-ovarian ligaments were cauterized and transected.  A posterior medial leaf of the broad ligament were then dissected/transected towards the ipsilateral uterosacral ligaments.  This allowed for the bilateral ureters to fall lateral inferior to the plane of dissection required for the hysterectomy.  Bilateral uterine vessels were skeletonized.  Bilateral uterine vessels were cauterized/sealed with multiple energy initiations/at multiple sites.  This was due to the extensive size of the uterine vessels.  Significant endometriosis was noted along the left uterosacral ligament.  The left uterosacral ligament endometriosis was transected and this tissue was sent to pathology for analysis.  Anterior colpotomy was made the 12 o'clock position worked towards the 3 and 9 o'clock positions.  Posterior colpotomy was made at the 6 o'clock position worked towards the 3 and 9 o'clock positions.  Bilateral uterine vessel complexes were transected at the 3 and 9 o'clock positions with excellent hemostasis being noted.  Colpotomy was then  completed.  The specimen was too large to be removed through the vaginal cuff opening.  A large [employer] bag was placed through the vaginal cuff opening and the uterine specimen with bilateral fallopian tubes and cervix was placed in the bag.  The bag was then pulled through the vagina.  The specimen was morcellated using a 10 blade and the EXCITE technique without complication.  No spillage of contents were noted.  The bag was noted to be intact post morcellation.  This was a completely contained morcellation procedure.  Vaginal cuff was then sutured using 0 Vicryl suture.  Figure-of-eight sutures were used at the angles.  Simple interrupted sutures were then placed at the angles and run to the middle of the vaginal cuff and tied together for excellent closure.  Four simple interrupted sutures were then placed as added support.  Excellent vaginal cuff closure was noted.  This was confirmed via speculum exam postprocedure.  The pelvis copiously irrigated the fluid removed via suction .  Excellent hemostasis was noted from all pedicle sites.  Bilateral ureters were noted have normal, forward vermiculation/peristalsis.  The bladder was filled with 200 cc with no evidence of injury.  There was 1 small area approximately 1-2 cm where either a small serosal abrasion of scar tissue was noted.  Scant bleeding was noted from the edges.  2 0 Vicryl suture in simple interrupted fashion was placed x 4 (see pictures below).  This was a superficially placed suture with no entry into the mucosa.  This was for precaution only.  Clear urine was noted throughout the case.  CO2 gas was released fashion.  Ports were removed and port sites skin was closed using 4 Monocryl suture in subcuticular There were no complication counts correct.  Anesthesia was reversed and patient was extubated and taken to the PACU in good condition.    Operative pictures:                                                Significant Surgical  Tasks Conducted by the Assistant(s), if Applicable: None    Complications: No    Estimated Blood Loss (EBL): 50 mL           Implants: * No implants in log *    Specimens:   Specimen (24h ago, onward)             Start     Ordered    03/14/22 1030  Specimen to Pathology, Surgery Gynecology and Obstetrics  Once        Comments: Pre-op Diagnosis: Fibroids, intramural [D25.1]  Thickened endometrium [R93.89]  Menometrorrhagia [N92.1]    Procedure(s):  XI ROBOTIC HYSTERECTOMY     Number of specimens: 2    Name of specimens: 1. LEFT UTEROSACRAL ENDOMETRIOSIS  2. UTERUS, CERVIX AND BILATERAL FALLOPIAN TUBES   References:    Click here for ordering Quick Tip   Question Answer Comment   Procedure Type: Gynecology and Obstetrics    Specimen Class: Routine/Screening    Release to patient Immediate        03/14/22 1031                        Condition: Good    Disposition: PACU - hemodynamically stable.    Attestation: I was present and scrubbed for the entire procedure.    Les Macdonald IV, MD

## 2022-03-14 NOTE — PLAN OF CARE
Nan Mendez has met all discharge criteria from Phase II. Vital Signs are stable, ambulating  without difficulty. Discharge instructions given, patient verbalized understanding. Discharged from facility via wheelchair in stable condition.

## 2022-03-14 NOTE — OR NURSING
Called and spoke with Tera and let him know patient is in recovery and doing fine. Verbalized understanding.

## 2022-03-14 NOTE — DISCHARGE SUMMARY
[facility] - Surgery ([place])  Brief Operative Note     SUMMARY     Surgery Date: 3/14/2022     Surgeon(s) and Role:     * Les Macdonald IV, MD - Primary     * Marva Smith MD - Resident - Assisting    Pre-op Diagnosis:  Fibroids, intramural [D25.1]  Thickened endometrium [R93.89]  Menometrorrhagia [N92.1]    Post-op Diagnosis:  Post-Op Diagnosis Codes:     * Fibroids, intramural [D25.1]     * Thickened endometrium [R93.89]     * Menometrorrhagia [N92.1]    Procedure(s) (LRB):  XI ROBOTIC HYSTERECTOMY, LYSIS OF ADHESIONS, BILATERAL SALPHINGECTOMY AND RESECTION OF ENDOMETRIOSIS (N/A)    Anesthesia: General    Findings/Key Components:   1. Uterus 12-14 week size with multiple fibroids.  Significant fibroids were noted bilaterally at the lower uterine segment/cervical junction.  2. Bilateral ovaries appeared normal.  3. Liver edge, gallbladder, and appendix visualized with no abnormality noted.  4. At the onset of the case there was some blood filled fluid in the cul-de-sac that was pre-existing prior to starting the procedure.  5. Bilateral ureters with normal, forward vermiculation/peristalsis postprocedure.  6. A contained morcellation was performed using a large [employer] bag using the EXCITE technique with a 10 blade.  The bag was noted to be intact post morcellation.  7. Excellent vaginal cuff closure was noted.  This was confirmed with speculum exam postprocedure.  8. There was no evidence of iatrogenic bladder injury.  A small area of serosal/scar potential defect was noted and reapproximated using 2 0 Vicryl suture in interrupted fashion x4.  Prior to repair the bladder was filled with 200 cc of normal saline with no evidence of iatrogenic bladder injury.  See pictures below.    9. An omental adhesion was noted to the anterior abdominal wall in the presumed prior  delivery scar.  A small fascial defect was noted in this area.    Estimated Blood Loss: 50 mL    IVF: See anesthesia  report    Complications: None         Specimens:   Specimen (24h ago, onward)             Start     Ordered    03/14/22 1030  Specimen to Pathology, Surgery Gynecology and Obstetrics  Once        Comments: Pre-op Diagnosis: Fibroids, intramural [D25.1]  Thickened endometrium [R93.89]  Menometrorrhagia [N92.1]    Procedure(s):  XI ROBOTIC HYSTERECTOMY     Number of specimens: 2    Name of specimens: 1. LEFT UTEROSACRAL ENDOMETRIOSIS  2. UTERUS, CERVIX AND BILATERAL FALLOPIAN TUBES   References:    Click here for ordering Quick Tip   Question Answer Comment   Procedure Type: Gynecology and Obstetrics    Specimen Class: Routine/Screening    Release to patient Immediate        03/14/22 1031                Discharge Note    SUMMARY     Admit Date: 3/14/2022    Discharge Date:  03/14/2022 5:30 PM    Hospital Course: Patient was admitted for the above mentioned procedure.  Procedure was performed without difficulty.  Please see operative report for further details.  She was transferred to recovery in stable condition and discharged home the same day.      Final Diagnosis: Post-Op Diagnosis Codes:     * Fibroids, intramural [D25.1]     * Thickened endometrium [R93.89]     * Menometrorrhagia [N92.1]    Disposition: Home or Self Care    Follow Up/Patient Instructions: see below    Medications:  Reconciled Home Medications:      Medication List      START taking these medications    ibuprofen 800 MG tablet  Commonly known as: ADVIL,MOTRIN  Take 1 tablet (800 mg total) by mouth every 8 (eight) hours as needed for Pain.     oxyCODONE-acetaminophen 5-325 mg per tablet  Commonly known as: PERCOCET  Take 1 tablet by mouth every 4 (four) hours as needed for Pain.        CONTINUE taking these medications    amLODIPine 10 MG tablet  Commonly known as: NORVASC  Take 10 mg by mouth.     fish oil-omega-3 fatty acids 300-1,000 mg capsule  Take 2 g by mouth.     MULTIVITAMIN 50 PLUS Tab  Generic drug: multivitamin-minerals-lutein  Take 1  tablet by mouth once daily.     thyroid (pork) 60 mg Tab  Commonly known as: ARMOUR THYROID  Take 60 mg by mouth.     vitamin D 1000 units Tab  Commonly known as: VITAMIN D3  Take 1,000 Units by mouth once daily.          Discharge Procedure Orders   Diet general     Pelvic Rest   Order Comments: Pelvic rest for at least 8-10 weeks. Nothing in vagina - no sex, tampons, or douching until cleared by physician.     Call MD for:  temperature >100.4     Call MD for:  persistent nausea and vomiting     Call MD for:  severe uncontrolled pain     Call MD for:  redness, tenderness, or signs of infection (pain, swelling, redness, odor or green/yellow discharge around incision site)     Call MD for:  persistent dizziness or light-headedness     Call MD for:   Order Comments: Heavy vaginal bleeding saturating more than 1 pad per hour for at least 2 hours.     Remove dressing in 24 hours     Activity as tolerated      Follow-up Information     Les Macdonald Iv, MD Follow up in 4 week(s).    Specialties: Obstetrics, Obstetrics and Gynecology  Why: Postop visit  Contact information:  [address] [id number] [phone]                           Marva Simth MD  OBGYN PGY-4

## 2022-03-14 NOTE — TRANSFER OF CARE
"Anesthesia Transfer of Care Note    Patient: Nan Mendez    Procedure(s) Performed: Procedure(s) (LRB):  XI ROBOTIC HYSTERECTOMY, LYSIS OF ADHESIONS, BILATERAL SALPHINGECTOMY AND RESECTION OF ENDOMETRIOSIS (N/A)    Patient location: PACU    Anesthesia Type: general    Transport from OR: Transported from OR on 2-3 L/min O2 by NC with adequate spontaneous ventilation. Continuous SpO2 monitoring in transport    Post pain: adequate analgesia    Post assessment: no apparent anesthetic complications and tolerated procedure well    Post vital signs: stable    Level of consciousness: awake and alert    Nausea/Vomiting: no nausea/vomiting    Complications: none    Transfer of care protocol was followed      Last vitals:   Visit Vitals  /65 (BP Location: Right arm, Patient Position: Lying)   Pulse 80   Temp 36.9 °C (98.4 °F) (Axillary)   Resp 18   Ht 5' 3" (1.6 m)   Wt 62.6 kg (138 lb)   LMP 02/21/2022   SpO2 97%   Breastfeeding No   BMI 24.45 kg/m²     "

## 2022-03-16 ENCOUNTER — TELEPHONE (OUTPATIENT)
Dept: OBSTETRICS AND GYNECOLOGY | Facility: CLINIC | Age: 47
End: 2022-03-16
Payer: COMMERCIAL

## 2022-03-19 ENCOUNTER — EMERGENCY (EMERGENCY)
Facility: HOSPITAL | Age: 47
LOS: 1 days | Discharge: HOME | End: 2022-03-19
Attending: EMERGENCY MEDICINE | Admitting: EMERGENCY MEDICINE
Payer: MEDICAID

## 2022-03-19 VITALS
HEIGHT: 62 IN | WEIGHT: 179.9 LBS | TEMPERATURE: 98 F | DIASTOLIC BLOOD PRESSURE: 71 MMHG | SYSTOLIC BLOOD PRESSURE: 114 MMHG | HEART RATE: 84 BPM | RESPIRATION RATE: 18 BRPM | OXYGEN SATURATION: 94 %

## 2022-03-19 DIAGNOSIS — Z98.51 TUBAL LIGATION STATUS: Chronic | ICD-10-CM

## 2022-03-19 LAB — SARS-COV-2 RNA SPEC QL NAA+PROBE: SIGNIFICANT CHANGE UP

## 2022-03-19 PROCEDURE — 99283 EMERGENCY DEPT VISIT LOW MDM: CPT | Mod: 25

## 2022-03-19 PROCEDURE — 94640 AIRWAY INHALATION TREATMENT: CPT

## 2022-03-19 PROCEDURE — U0003: CPT

## 2022-03-19 PROCEDURE — 99284 EMERGENCY DEPT VISIT MOD MDM: CPT

## 2022-03-19 PROCEDURE — U0005: CPT

## 2022-03-19 RX ORDER — FLUTICASONE PROPIONATE 50 MCG
1 SPRAY, SUSPENSION NASAL
Refills: 0 | Status: DISCONTINUED | OUTPATIENT
Start: 2022-03-19 | End: 2022-03-22

## 2022-03-19 RX ORDER — IBUPROFEN 200 MG
1 TABLET ORAL
Qty: 30 | Refills: 0
Start: 2022-03-19

## 2022-03-19 RX ORDER — AMOXICILLIN 250 MG/5ML
1 SUSPENSION, RECONSTITUTED, ORAL (ML) ORAL
Qty: 14 | Refills: 0
Start: 2022-03-19 | End: 2022-03-25

## 2022-03-19 RX ORDER — IBUPROFEN 200 MG
600 TABLET ORAL ONCE
Refills: 0 | Status: COMPLETED | OUTPATIENT
Start: 2022-03-19 | End: 2022-03-19

## 2022-03-19 RX ORDER — FLUTICASONE PROPIONATE 50 MCG
1 SPRAY, SUSPENSION NASAL
Qty: 1 | Refills: 0
Start: 2022-03-19

## 2022-03-19 RX ADMIN — Medication 1 SPRAY(S): at 14:06

## 2022-03-19 RX ADMIN — Medication 600 MILLIGRAM(S): at 14:06

## 2022-03-19 NOTE — ED PROVIDER NOTE - PHYSICAL EXAMINATION
VITAL SIGNS: I have reviewed nursing notes and confirm.  CONSTITUTIONAL: Well-developed; well-nourished; in no acute distress.   SKIN:  warm and dry, no acute rash.   HEAD:  normocephalic, atraumatic.  EYES: PERRL, EOM intact; conjunctiva and sclera clear.  ENT: No nasal discharge; airway clear. TM nl on L, canal nl, op benign, no sinus tap ttp, no dental or gingival ttp, no gingival swelling  NECK: Supple; non tender.  CARD: S1, S2 normal; no murmurs, gallops, or rubs. Regular rate and rhythm.   RESP:  Clear to auscultation b/l, no wheezes, rales or rhonchi.  ABD: Normal bowel sounds; soft; non-distended; non-tender; no guarding/ rebound.  MSK: Normal ROM. No clubbing, cyanosis or edema. no ttp bilat le  NEURO: Alert, oriented, grossly unremarkable  PSYCH: Cooperative, mood and affect appropriate.

## 2022-03-19 NOTE — ED PROVIDER NOTE - PATIENT PORTAL LINK FT
You can access the FollowMyHealth Patient Portal offered by [facility] by registering at the following website: [email or web address]. By joining [facility]’s FollowMyHealth portal, you will also be able to view your health information using other applications (apps) compatible with our system.

## 2022-03-19 NOTE — ED PROVIDER NOTE - OBJECTIVE STATEMENT
47 yo female h/o seasonal allergy c/o nasal congestion, sinus pain on L below eye radiating towards ear w/o fever, ha, ear pain, eye pain, change in vision, + mild dental pain (reports recent dental implants several months ago but no pain until current sx) x several days, also cough w clear phlegm w/o cp, sob.  No sick contacts, travel.  Took dayquil w some relief.

## 2022-03-19 NOTE — ED PROVIDER NOTE - CLINICAL SUMMARY MEDICAL DECISION MAKING FREE TEXT BOX
Pt c/o uri type sx, sinus pressure, dental pain w/o sig abnl on exam.  Suspect sinus congestion affecting nearby structures, no fever, ttp to suggest bacterial sinusitis.  ? referred pain from dental infection since s/p implants (no facial swelling, gum swelling, ttp to suggest this).  ? seasonal allergy.  No evidence to suggest sepsis or meningitis;  The patient is non toxic appearing with no focal lung findings and acceptable oxygenation.  No increased wob or resp distress.  No further eval or treatment indicated at this time.  Supportive care including increased fluids and over the counter therapy was advised and discussed.  Pt counseled to quarantine x 5 d and mask for a total of 10 unless COVID neg.  Typical COVID course discussed; pt counseled to return for worsening ha, neck stiffness, cp, sob, fever, any other concerns.  Hand hygiene and isolation reviewed.

## 2022-03-19 NOTE — ED PROVIDER NOTE - NSFOLLOWUPINSTRUCTIONS_ED_ALL_ED_FT
Upper respiratory infection  Sinus pressure    We are concerned you may have COVID, a different viral infection or allergies causing your symptoms.    Rest.  Drink plenty of fluids.  Try over the counter medications based on your symptoms for relief; use as directed: sudafed or similar, Use flonase (fluticasone) -  1 sprays each nostril twice a day for nasal congestion as needed.  Take Tessalon Perles 1 tab every 8 hours as needed for cough.  Take ibuprofen 1 tab every 6 hours with food as needed for pain.  Add tylenol for additional pain relief as needed - use as directed.    Also try an antihistamine for your allergies - take 1 tab loratadine once a day.    IF your symptoms don't improve, start Amoxicillin 1 tab twice a day for 1 week.      ** Self-isolate for 5 days and mask for an additional 5 days for a total of 10 days from the onset of your symptoms or your positive test if you are asymptomatic (extend your isolation if you are still having cough or fever at day 10 until you are cough and fever free for 24 hours) UNLESS your COVID test is negative.  Cough/sneeze into your elbow area.  Wash your hands after touching your face and avoid touching your face if able; if you are sharing spaces, clean surfaces after you touch them.     ------    Infección de las vías respiratorias superiores  Presión en los senos    Nos preocupa que pueda tener COVID, casper infección viral diferente o alergias que causen katey síntomas.    [place]. Beber mucho líquido. Pruebe medicamentos de venta brennan en función de katey síntomas para el alivio; use según las indicaciones: sudafed o similar, use flonasa (fluticasona): 1 rociado en cada fosa nasal dos veces al día para la congestión nasal, según sea necesario. [place] Tessalon Perles 1 pestaña cada 8 horas según sea necesario para la tos. [place] ibuprofeno 1 tableta cada 6 horas con comida según sea necesario para el dolor. Agregue tylenol para aliviar el dolor adicional según sea necesario; úselo según las indicaciones.    [place]ue[place] también un antihistamínico para katey alergias: tome 1 tableta de loratadina casper vez al día.    Regrese por aumento del dolor, aumento de la dificultad para respirar, cambios en la tos/esputo, aumento de la fiebre, vómitos intratables, cualquier otra inquietud.    SI katey síntomas no mejoran, comience con Amoxicilina 1 tableta dos veces al día brittany 1 semana.     ** Autoaíslese brittany 5 días y use mascarilla brittany 5 días adicionales para un total de 10 días desde el inicio de katey síntomas o lisa prueba positiva si es asintomático (extienda lisa aislamiento si todavía tiene tos o fiebre el día 10 hasta que esté brennan de tos y fiebre brittany 24 horas) A MENOS QUE lisa prueba de COVID sea negativa. Tosa/estornude en el área del codo. Lávese las miguelito después de tocarse la rama y evite tocarse la rama si puede; si está compartiendo espacios, limpie las superficies después de tocarlas. o infección respiratoria  Presión en los senos    Nos preocupa que pueda tener COVID, casper infección viral diferente o alergias que causen katey síntomas.    [place]. Beber mucho líquido. Pruebe medicamentos de venta brennan en función de katey síntomas para el alivio; use según las indicaciones: sudafed o similar, use flonasa (fluticasona): 1 rociado en cada fosa nasal dos veces al día para la congestión nasal, según sea necesario. [place] Tessalon Perles 1 pestaña cada 8 horas según sea necesario para la tos. [place] ibuprofeno 1 tableta cada 6 horas con comida según sea necesario para el dolor. Agregue tylenol para aliviar el dolor adicional según sea necesario; úselo según las indicaciones.    Regrese por aumento del dolor, aumento de la dificultad para respirar, cambios en la tos/esputo, aumento de la fiebre, vómitos intratables, cualquier otra inquietud.    SI katey síntomas no mejoran, comience con Amoxicilina 1 tableta dos veces al día brittany 1 semana.     ** Autoaíslese brittany 5 días y use mascarilla brittany 5 días adicionales para un total de 10 días desde el inicio de katey síntomas o lisa prueba positiva si es asintomático (extienda lisa aislamiento si todavía tiene tos o fiebre el día 10 hasta que esté brennan de tos y fiebre brittany 24 horas) A MENOS QUE lisa prueba de COVID sea negativa. Tosa/estornude en el área del codo. Lávese las miguelito después de tocarse la rama y evite tocarse la rama si puede; si está compartiendo espacios, limpie las superficies después de tocarlas.    Return for increased pain, increased difficulty breathing, change in cough/sputum, increased fever, intractable vomiting, any other concerns.    IF your symptoms don't improve, start Amoxicillin 1 tab twice a day for 1 week.      ** Self-isolate for 5 days and mask for an additional 5 days for a total of 10 days from the onset of your symptoms or your positive test if you are asymptomatic (extend your isolation if you are still having cough or fever at day 10 until you are cough and fever free for 24 hours) UNLESS your COVID test is negative.  Cough/sneeze into your elbow area.  Wash your hands after touching your face and avoid touching your face if able; if you are sharing spaces, clean surfaces after you touch them.

## 2022-03-19 NOTE — ED PROVIDER NOTE - NSICDXFAMILYHX_GEN_ALL_CORE_FT
FAMILY HISTORY:  Father  Still living? Yes, Estimated age: 79  Family history of prostate cancer, Age at diagnosis: Age Unknown

## 2022-03-19 NOTE — ED ADULT TRIAGE NOTE - CHIEF COMPLAINT QUOTE
47 y/o female c/o nasal congestion and sinus pressure, states had new dentures recently. Pt also endorses cough.

## 2022-03-21 DIAGNOSIS — Z20.822 CONTACT WITH AND (SUSPECTED) EXPOSURE TO COVID-19: ICD-10-CM

## 2022-03-21 DIAGNOSIS — K08.89 OTHER SPECIFIED DISORDERS OF TEETH AND SUPPORTING STRUCTURES: ICD-10-CM

## 2022-03-21 DIAGNOSIS — J34.89 OTHER SPECIFIED DISORDERS OF NOSE AND NASAL SINUSES: ICD-10-CM

## 2022-03-21 DIAGNOSIS — R09.81 NASAL CONGESTION: ICD-10-CM

## 2022-03-21 DIAGNOSIS — J06.9 ACUTE UPPER RESPIRATORY INFECTION, UNSPECIFIED: ICD-10-CM

## 2022-03-22 LAB
FINAL PATHOLOGIC DIAGNOSIS: NORMAL
GROSS: NORMAL
Lab: NORMAL

## 2022-04-06 ENCOUNTER — TELEPHONE (OUTPATIENT)
Dept: OBSTETRICS AND GYNECOLOGY | Facility: CLINIC | Age: 47
End: 2022-04-06
Payer: COMMERCIAL

## 2022-04-06 NOTE — TELEPHONE ENCOUNTER
Patient having brownish discharge, patient informed can occur associated with vaginal cuff healing, patient denies active bleeding. Patient had questions regarding feeling tired, however improving weekly. Patient informed common following surgery, monitor if does not improve contact office. Patient given ED instructions and verbalized understanding.

## 2022-04-06 NOTE — TELEPHONE ENCOUNTER
----- Message from Sasha Baird sent at 4/6/2022 12:48 PM CDT -----  Regarding: Patient  Advice          Name of Who is Calling:  Nan Mendez    Who Left The Message   Nan Mendez      What is the request in detail:        Patient called requesting a call regarding her recovery since her procedure.   Please further advise.     Thank you!       Reply by [facility]: No      Preferred Call Back  :  [phone] (m)

## 2022-04-19 ENCOUNTER — OFFICE VISIT (OUTPATIENT)
Dept: OBSTETRICS AND GYNECOLOGY | Facility: CLINIC | Age: 47
End: 2022-04-19
Payer: COMMERCIAL

## 2022-04-19 VITALS
DIASTOLIC BLOOD PRESSURE: 82 MMHG | HEIGHT: 63 IN | SYSTOLIC BLOOD PRESSURE: 124 MMHG | BODY MASS INDEX: 24.02 KG/M2 | WEIGHT: 135.56 LBS

## 2022-04-19 DIAGNOSIS — Z90.710 S/P LAPAROSCOPIC HYSTERECTOMY: ICD-10-CM

## 2022-04-19 DIAGNOSIS — N80.30 PELVIC PERITONEAL ENDOMETRIOSIS: ICD-10-CM

## 2022-04-19 DIAGNOSIS — Z09 POSTOPERATIVE EXAMINATION: Primary | ICD-10-CM

## 2022-04-19 PROBLEM — N92.1 MENOMETRORRHAGIA: Status: RESOLVED | Noted: 2022-01-19 | Resolved: 2022-04-19

## 2022-04-19 PROBLEM — N85.2 BULKY OR ENLARGED UTERUS: Status: RESOLVED | Noted: 2022-01-19 | Resolved: 2022-04-19

## 2022-04-19 PROBLEM — D25.1 FIBROIDS, INTRAMURAL: Status: RESOLVED | Noted: 2022-01-19 | Resolved: 2022-04-19

## 2022-04-19 PROCEDURE — 4010F PR ACE/ARB THEARPY RXD/TAKEN: ICD-10-PCS | Mod: CPTII,S$GLB,, | Performed by: OBSTETRICS & GYNECOLOGY

## 2022-04-19 PROCEDURE — 3079F PR MOST RECENT DIASTOLIC BLOOD PRESSURE 80-89 MM HG: ICD-10-PCS | Mod: CPTII,S$GLB,, | Performed by: OBSTETRICS & GYNECOLOGY

## 2022-04-19 PROCEDURE — 3074F PR MOST RECENT SYSTOLIC BLOOD PRESSURE < 130 MM HG: ICD-10-PCS | Mod: CPTII,S$GLB,, | Performed by: OBSTETRICS & GYNECOLOGY

## 2022-04-19 PROCEDURE — 3008F PR BODY MASS INDEX (BMI) DOCUMENTED: ICD-10-PCS | Mod: CPTII,S$GLB,, | Performed by: OBSTETRICS & GYNECOLOGY

## 2022-04-19 PROCEDURE — 1159F PR MEDICATION LIST DOCUMENTED IN MEDICAL RECORD: ICD-10-PCS | Mod: CPTII,S$GLB,, | Performed by: OBSTETRICS & GYNECOLOGY

## 2022-04-19 PROCEDURE — 99024 PR POST-OP FOLLOW-UP VISIT: ICD-10-PCS | Mod: S$GLB,,, | Performed by: OBSTETRICS & GYNECOLOGY

## 2022-04-19 PROCEDURE — 3074F SYST BP LT 130 MM HG: CPT | Mod: CPTII,S$GLB,, | Performed by: OBSTETRICS & GYNECOLOGY

## 2022-04-19 PROCEDURE — 99024 POSTOP FOLLOW-UP VISIT: CPT | Mod: S$GLB,,, | Performed by: OBSTETRICS & GYNECOLOGY

## 2022-04-19 PROCEDURE — 99999 PR PBB SHADOW E&M-EST. PATIENT-LVL III: CPT | Mod: PBBFAC,,, | Performed by: OBSTETRICS & GYNECOLOGY

## 2022-04-19 PROCEDURE — 4010F ACE/ARB THERAPY RXD/TAKEN: CPT | Mod: CPTII,S$GLB,, | Performed by: OBSTETRICS & GYNECOLOGY

## 2022-04-19 PROCEDURE — 1159F MED LIST DOCD IN RCRD: CPT | Mod: CPTII,S$GLB,, | Performed by: OBSTETRICS & GYNECOLOGY

## 2022-04-19 PROCEDURE — 99999 PR PBB SHADOW E&M-EST. PATIENT-LVL III: ICD-10-PCS | Mod: PBBFAC,,, | Performed by: OBSTETRICS & GYNECOLOGY

## 2022-04-19 PROCEDURE — 3079F DIAST BP 80-89 MM HG: CPT | Mod: CPTII,S$GLB,, | Performed by: OBSTETRICS & GYNECOLOGY

## 2022-04-19 PROCEDURE — 3008F BODY MASS INDEX DOCD: CPT | Mod: CPTII,S$GLB,, | Performed by: OBSTETRICS & GYNECOLOGY

## 2022-04-19 RX ORDER — POLYETHYLENE GLYCOL 3350, SODIUM SULFATE ANHYDROUS, SODIUM BICARBONATE, SODIUM CHLORIDE, POTASSIUM CHLORIDE 236; 22.74; 6.74; 5.86; 2.97 G/4L; G/4L; G/4L; G/4L; G/4L
POWDER, FOR SOLUTION ORAL
COMMUNITY
Start: 2022-04-12 | End: 2023-07-31

## 2022-04-19 NOTE — PROGRESS NOTES
"Postop visit    Nan Mendez is a 46 y.o. female  post-op from a robotic assisted hysterectomy with bilateral salpingectomy and resection of endometriosis on 2022.  Patient is doing well postoperatively.  No pain.  No bowel or bladder complaints.  No fever.      The pathology report reviewed with patient.    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    Hypertension     Infertility, female      Past Surgical History:   Procedure Laterality Date    ABDOMINAL SURGERY      diag lap    CARPAL TUNNEL RELEASE Bilateral      &      SECTION  2004     SECTION  2011    ROBOT-ASSISTED LAPAROSCOPIC ABDOMINAL HYSTERECTOMY USING DA ALEXI XI N/A 3/14/2022    Procedure: XI ROBOTIC HYSTERECTOMY, BILATERAL SALPHINGECTOMY AND RESECTION OF ENDOMETRIOSIS;  Surgeon: Les Macdonald IV, MD;  Location: [facility];  Service: OB/GYN;  Laterality: N/A;    ROBOT-ASSISTED LAPAROSCOPIC LYSIS OF ADHESIONS USING DA ALEXI XI N/A 3/14/2022    Procedure: XI ROBOTIC LYSIS, ADHESIONS;  Surgeon: Les Macdonald IV, MD;  Location: [facility];  Service: OB/GYN;  Laterality: N/A;     Family History   Problem Relation Age of Onset    Colon cancer Mother     Breast cancer Neg Hx     Ovarian cancer Neg Hx      Social History     Tobacco Use    Smoking status: Never Smoker    Smokeless tobacco: Never Used   Substance Use Topics    Alcohol use: Yes     Comment: social    Drug use: Never     OB History    Para Term  AB Living   2 2   2   2   SAB IAB Ectopic Multiple Live Births                  # Outcome Date GA Lbr [place]/2nd Weight Sex Delivery Anes PTL Lv   2  11    M CS-LTranv      1  04    F CS-LTranv          /82   Ht 5' 3" (1.6 m)   Wt 61.5 kg (135 lb 9.3 oz)   LMP 2022   BMI 24.02 kg/m²     ROS:  GENERAL: No fever, chills, fatigability or weight loss.  VULVAR: No pain, no lesions and no " itching.  VAGINAL: No relaxation, no itching, no discharge, no abnormal bleeding and no lesions.  ABDOMEN: No abdominal pain. Denies nausea. Denies vomiting. No diarrhea. No constipation  BREAST: Denies pain. No lumps. No discharge.  URINARY: No incontinence, no nocturia, no frequency and no dysuria.  CARDIOVASCULAR: No chest pain. No shortness of breath. No leg cramps.  NEUROLOGICAL: No headaches. No vision changes.    PE:   APPEARANCE: Well nourished, well developed, in no acute distress.  GENITOURINARY:  Vulva: No lesions. No erythema nor excoriations noted,Normal female genital architecture.  Urethral Meatus: Normal size and location, no lesions, no prolapse.  Urethra: No masses, tenderness, prolapse or scarring.  Vagin Moist and with rugae, no discharge, no significant cystocele or rectocele.  Vaginal cuff healing well.  Cervix: Absent  Uterus: Absent  Adnexa: No masses, tenderness or CDS nodularity.  Anus Perineum: No lesions, no relaxation, no external hemorrhoids.  Abdomen: No masses, tenderness, hernia or ascites, no hepatosplenomegaly.  Davinci port sites healed.   Skin: No rashes, lesions, ulcers, acne, hirsutism.  Peripheral/lower extremities: No edema, erythema or tenderness.  Lymphatic: No groin nodes palp.  Mental Status: Alert, oriented x 3, normal affect and mood      ICD-10-CM ICD-9-CM    1. Postoperative examination  Z09 V67.00    2. Pelvic peritoneal endometriosis  N80.3 617.3    3. s/p RA-TLH/BS  Z90.710 V88.01      Plan:  Patient cleared for routine activity/exercise.    Continue pelvic rest x 10 weeks.  Patient verbalized understanding.    Annual exam in one year.    Patient instructed to monitor for menopausal symptoms/hot flashes.    Keep regular PCP follow-up    Les Macdonald IV, MD

## 2022-07-06 ENCOUNTER — OFFICE VISIT (OUTPATIENT)
Dept: PODIATRY | Facility: CLINIC | Age: 47
End: 2022-07-06
Payer: COMMERCIAL

## 2022-07-06 ENCOUNTER — HOSPITAL ENCOUNTER (OUTPATIENT)
Dept: RADIOLOGY | Facility: HOSPITAL | Age: 47
Discharge: HOME | End: 2022-07-06
Attending: PODIATRIST
Payer: COMMERCIAL

## 2022-07-06 VITALS
SYSTOLIC BLOOD PRESSURE: 129 MMHG | DIASTOLIC BLOOD PRESSURE: 88 MMHG | HEART RATE: 66 BPM | BODY MASS INDEX: 24.64 KG/M2 | WEIGHT: 139.13 LBS

## 2022-07-06 DIAGNOSIS — S93.402A SEVERE ANKLE SPRAIN, LEFT, INITIAL ENCOUNTER: Primary | ICD-10-CM

## 2022-07-06 DIAGNOSIS — S93.402A SEVERE ANKLE SPRAIN, LEFT, INITIAL ENCOUNTER: ICD-10-CM

## 2022-07-06 PROCEDURE — 3008F BODY MASS INDEX DOCD: CPT | Mod: CPTII,S$GLB,, | Performed by: PODIATRIST

## 2022-07-06 PROCEDURE — 3074F SYST BP LT 130 MM HG: CPT | Mod: CPTII,S$GLB,, | Performed by: PODIATRIST

## 2022-07-06 PROCEDURE — 99999 PR PBB SHADOW E&M-EST. PATIENT-LVL III: ICD-10-PCS | Mod: PBBFAC,,, | Performed by: PODIATRIST

## 2022-07-06 PROCEDURE — 1159F PR MEDICATION LIST DOCUMENTED IN MEDICAL RECORD: ICD-10-PCS | Mod: CPTII,S$GLB,, | Performed by: PODIATRIST

## 2022-07-06 PROCEDURE — 3008F PR BODY MASS INDEX (BMI) DOCUMENTED: ICD-10-PCS | Mod: CPTII,S$GLB,, | Performed by: PODIATRIST

## 2022-07-06 PROCEDURE — 99214 OFFICE O/P EST MOD 30 MIN: CPT | Mod: S$GLB,,, | Performed by: PODIATRIST

## 2022-07-06 PROCEDURE — 73610 X-RAY EXAM OF ANKLE: CPT | Mod: TC,LT

## 2022-07-06 PROCEDURE — 4010F PR ACE/ARB THEARPY RXD/TAKEN: ICD-10-PCS | Mod: CPTII,S$GLB,, | Performed by: PODIATRIST

## 2022-07-06 PROCEDURE — 73610 X-RAY EXAM OF ANKLE: CPT | Mod: 26,LT,, | Performed by: RADIOLOGY

## 2022-07-06 PROCEDURE — 3074F PR MOST RECENT SYSTOLIC BLOOD PRESSURE < 130 MM HG: ICD-10-PCS | Mod: CPTII,S$GLB,, | Performed by: PODIATRIST

## 2022-07-06 PROCEDURE — 99214 PR OFFICE/OUTPT VISIT, EST, LEVL IV, 30-39 MIN: ICD-10-PCS | Mod: S$GLB,,, | Performed by: PODIATRIST

## 2022-07-06 PROCEDURE — 99999 PR PBB SHADOW E&M-EST. PATIENT-LVL III: CPT | Mod: PBBFAC,,, | Performed by: PODIATRIST

## 2022-07-06 PROCEDURE — 73610 XR ANKLE COMPLETE 3 VIEW LEFT: ICD-10-PCS | Mod: 26,LT,, | Performed by: RADIOLOGY

## 2022-07-06 PROCEDURE — 4010F ACE/ARB THERAPY RXD/TAKEN: CPT | Mod: CPTII,S$GLB,, | Performed by: PODIATRIST

## 2022-07-06 PROCEDURE — 1159F MED LIST DOCD IN RCRD: CPT | Mod: CPTII,S$GLB,, | Performed by: PODIATRIST

## 2022-07-06 PROCEDURE — 3079F DIAST BP 80-89 MM HG: CPT | Mod: CPTII,S$GLB,, | Performed by: PODIATRIST

## 2022-07-06 PROCEDURE — 3079F PR MOST RECENT DIASTOLIC BLOOD PRESSURE 80-89 MM HG: ICD-10-PCS | Mod: CPTII,S$GLB,, | Performed by: PODIATRIST

## 2022-07-06 RX ORDER — DICLOFENAC SODIUM 10 MG/G
2 GEL TOPICAL 4 TIMES DAILY
Qty: 100 G | Refills: 2 | Status: SHIPPED | OUTPATIENT
Start: 2022-07-06

## 2022-07-06 RX ORDER — MELOXICAM 15 MG/1
15 TABLET ORAL DAILY
Qty: 30 TABLET | Refills: 0 | Status: SHIPPED | OUTPATIENT
Start: 2022-07-06 | End: 2023-07-31

## 2022-07-17 NOTE — PROGRESS NOTES
Subjective:      Patient ID: Nan Mendez is a 46 y.o. female.    Chief Complaint: Ankle Pain (Left ankle pain ) and Foot Pain    Nan is a 46 y.o. female who presents to the podiatry clinic  with complaint of left ankle pain after injuring her ankle indication/inversion ankle injury.  Review of Systems   Constitutional: Negative for chills, decreased appetite, fever and malaise/fatigue.   HENT: Negative for congestion, hearing loss, nosebleeds and tinnitus.    Eyes: Negative for double vision, pain, photophobia and visual disturbance.   Cardiovascular: Negative for chest pain, claudication, cyanosis and leg swelling.   Respiratory: Negative for cough, hemoptysis, shortness of breath and wheezing.    Endocrine: Negative for cold intolerance and heat intolerance.   Hematologic/Lymphatic: Negative for adenopathy and bleeding problem.   Skin: Negative for color change, dry skin, itching, nail changes and suspicious lesions.   Musculoskeletal: Positive for joint pain, myalgias and stiffness. Negative for arthritis.   Gastrointestinal: Negative for abdominal pain, jaundice, nausea and vomiting.   Genitourinary: Negative for dysuria, frequency and hematuria.   Neurological: Negative for difficulty with concentration, loss of balance, numbness, paresthesias and sensory change.   Psychiatric/Behavioral: Negative for altered mental status, hallucinations and suicidal ideas. The patient is not nervous/anxious.    Allergic/Immunologic: Negative for environmental allergies and persistent infections.           Objective:      Physical Exam  Vitals reviewed.   Constitutional:       Appearance: She is well-developed.   HENT:      Head: Normocephalic and atraumatic.   Cardiovascular:      Pulses:           Dorsalis pedis pulses are 2+ on the right side and 2+ on the left side.        Posterior tibial pulses are 2+ on the right side and 2+ on the left side.   Pulmonary:      Effort: Pulmonary effort is normal.    Musculoskeletal:         General: Normal range of motion.      Comments: Inspection and palpation of the muscles joints and bones of both lower extremities reveal that muscle strength for the anterior lateral and posterior muscle groups and intrinsic muscle groups of the foot are all 5 over 5 symmetrical.   Tenderness left anterior talofibular ligament and directly to the distal fibula.  No gross instability noted.   Skin:     General: Skin is warm and dry.      Capillary Refill: Capillary refill takes 2 to 3 seconds.      Comments: Skin turgor is normal bilaterally.  Skin texture is well hydrated to both lower extremities.  No lesions or rashes or wounds appreciated bilaterally.  Nail plates 1 through 5 bilaterally are within normal limits for length and thickness.  No nail clubbing or incurvation noted.   Neurological:      Mental Status: She is alert and oriented to person, place, and time.      Comments: Sharp dull light touch vibratory proprioceptive sensation are intact bilaterally.  Deep tendon reflexes to patellar and Achilles tendon are symmetrical 2 over 4 bilaterally.  No ankle clonus or Babinski reflexes noted bilaterally.  Coordination is normal to both feet and lower extremities.   Psychiatric:         Behavior: Behavior normal.               Assessment:       Encounter Diagnosis   Name Primary?    Severe ankle sprain, left, initial encounter Yes         Plan:       Nan was seen today for ankle pain and foot pain.    Diagnoses and all orders for this visit:    Severe ankle sprain, left, initial encounter  -     X-Ray Ankle Complete Left; Future  -     MRI Ankle Without Contrast Left; Future    Other orders  -     meloxicam (MOBIC) 15 MG tablet; Take 1 tablet (15 mg total) by mouth once daily.  -     diclofenac sodium (VOLTAREN) 1 % Gel; Apply 2 g topically 4 (four) times daily.      I counseled the patient on her conditions, their implications and medical management.      Independent visualization  of imaging was performed.  Results were reviewed in detail with patient.    Begin icing, topical anti-inflammatory medication, walking boot and rest.  Transition into ankle brace.  Begin meloxicam daily.  Follow-up in four weeks.

## 2022-07-28 ENCOUNTER — HOSPITAL ENCOUNTER (OUTPATIENT)
Dept: RADIOLOGY | Facility: HOSPITAL | Age: 47
Discharge: HOME | End: 2022-07-28
Attending: PODIATRIST
Payer: COMMERCIAL

## 2022-07-28 DIAGNOSIS — S93.402A SEVERE ANKLE SPRAIN, LEFT, INITIAL ENCOUNTER: ICD-10-CM

## 2022-07-28 PROCEDURE — 73721 MRI ANKLE WITHOUT CONTRAST LEFT: ICD-10-PCS | Mod: 26,LT,, | Performed by: RADIOLOGY

## 2022-07-28 PROCEDURE — 73721 MRI JNT OF LWR EXTRE W/O DYE: CPT | Mod: 26,LT,, | Performed by: RADIOLOGY

## 2022-07-28 PROCEDURE — 73721 MRI JNT OF LWR EXTRE W/O DYE: CPT | Mod: TC,LT

## 2022-08-04 ENCOUNTER — PATIENT MESSAGE (OUTPATIENT)
Dept: PODIATRY | Facility: CLINIC | Age: 47
End: 2022-08-04
Payer: COMMERCIAL

## 2022-08-05 ENCOUNTER — TELEPHONE (OUTPATIENT)
Dept: PODIATRY | Facility: CLINIC | Age: 47
End: 2022-08-05
Payer: COMMERCIAL

## 2022-08-05 NOTE — TELEPHONE ENCOUNTER
Patient was called regarding an appointment made to read her MRI results.    Patient was contacted via telephone .    Patient didn't answer.     Appointment was set.    Another attempt to contact and rescheduled will be

## 2022-08-09 ENCOUNTER — OFFICE VISIT (OUTPATIENT)
Dept: PODIATRY | Facility: CLINIC | Age: 47
End: 2022-08-09
Payer: COMMERCIAL

## 2022-08-09 VITALS
HEART RATE: 64 BPM | BODY MASS INDEX: 24.64 KG/M2 | DIASTOLIC BLOOD PRESSURE: 82 MMHG | SYSTOLIC BLOOD PRESSURE: 127 MMHG | WEIGHT: 139.13 LBS

## 2022-08-09 DIAGNOSIS — S93.402A SEVERE ANKLE SPRAIN, LEFT, INITIAL ENCOUNTER: Primary | ICD-10-CM

## 2022-08-09 PROCEDURE — 3008F BODY MASS INDEX DOCD: CPT | Mod: CPTII,S$GLB,, | Performed by: PODIATRIST

## 2022-08-09 PROCEDURE — 99214 OFFICE O/P EST MOD 30 MIN: CPT | Mod: S$GLB,,, | Performed by: PODIATRIST

## 2022-08-09 PROCEDURE — 4010F PR ACE/ARB THEARPY RXD/TAKEN: ICD-10-PCS | Mod: CPTII,S$GLB,, | Performed by: PODIATRIST

## 2022-08-09 PROCEDURE — 1159F PR MEDICATION LIST DOCUMENTED IN MEDICAL RECORD: ICD-10-PCS | Mod: CPTII,S$GLB,, | Performed by: PODIATRIST

## 2022-08-09 PROCEDURE — 3008F PR BODY MASS INDEX (BMI) DOCUMENTED: ICD-10-PCS | Mod: CPTII,S$GLB,, | Performed by: PODIATRIST

## 2022-08-09 PROCEDURE — 99214 PR OFFICE/OUTPT VISIT, EST, LEVL IV, 30-39 MIN: ICD-10-PCS | Mod: S$GLB,,, | Performed by: PODIATRIST

## 2022-08-09 PROCEDURE — 4010F ACE/ARB THERAPY RXD/TAKEN: CPT | Mod: CPTII,S$GLB,, | Performed by: PODIATRIST

## 2022-08-09 PROCEDURE — 99999 PR PBB SHADOW E&M-EST. PATIENT-LVL III: CPT | Mod: PBBFAC,,, | Performed by: PODIATRIST

## 2022-08-09 PROCEDURE — 3079F PR MOST RECENT DIASTOLIC BLOOD PRESSURE 80-89 MM HG: ICD-10-PCS | Mod: CPTII,S$GLB,, | Performed by: PODIATRIST

## 2022-08-09 PROCEDURE — 1159F MED LIST DOCD IN RCRD: CPT | Mod: CPTII,S$GLB,, | Performed by: PODIATRIST

## 2022-08-09 PROCEDURE — 3079F DIAST BP 80-89 MM HG: CPT | Mod: CPTII,S$GLB,, | Performed by: PODIATRIST

## 2022-08-09 PROCEDURE — 3074F PR MOST RECENT SYSTOLIC BLOOD PRESSURE < 130 MM HG: ICD-10-PCS | Mod: CPTII,S$GLB,, | Performed by: PODIATRIST

## 2022-08-09 PROCEDURE — 3074F SYST BP LT 130 MM HG: CPT | Mod: CPTII,S$GLB,, | Performed by: PODIATRIST

## 2022-08-09 PROCEDURE — 99999 PR PBB SHADOW E&M-EST. PATIENT-LVL III: ICD-10-PCS | Mod: PBBFAC,,, | Performed by: PODIATRIST

## 2022-08-13 NOTE — PROGRESS NOTES
Subjective:      Patient ID: Nan Mendez is a 47 y.o. female.    Chief Complaint: Follow-up and Foot Pain (Discuss MRI left ankle )    Nan is a 47 y.o. female who presents to the podiatry clinic  with complaint of left ankle pain after injuring her ankle indication/inversion ankle injury.  She is doing well after wearing the boot for several weeks.  She is here for follow-up ankle sprain evaluation left, she is also here for review MRI.  Review of Systems   Constitutional: Negative for chills, decreased appetite, fever and malaise/fatigue.   HENT: Negative for congestion, hearing loss, nosebleeds and tinnitus.    Eyes: Negative for double vision, pain, photophobia and visual disturbance.   Cardiovascular: Negative for chest pain, claudication, cyanosis and leg swelling.   Respiratory: Negative for cough, hemoptysis, shortness of breath and wheezing.    Endocrine: Negative for cold intolerance and heat intolerance.   Hematologic/Lymphatic: Negative for adenopathy and bleeding problem.   Skin: Negative for color change, dry skin, itching, nail changes and suspicious lesions.   Musculoskeletal: Positive for joint pain, myalgias and stiffness. Negative for arthritis.   Gastrointestinal: Negative for abdominal pain, jaundice, nausea and vomiting.   Genitourinary: Negative for dysuria, frequency and hematuria.   Neurological: Negative for difficulty with concentration, loss of balance, numbness, paresthesias and sensory change.   Psychiatric/Behavioral: Negative for altered mental status, hallucinations and suicidal ideas. The patient is not nervous/anxious.    Allergic/Immunologic: Negative for environmental allergies and persistent infections.           Objective:      Physical Exam  Vitals reviewed.   Constitutional:       Appearance: She is well-developed.   HENT:      Head: Normocephalic and atraumatic.   Cardiovascular:      Pulses:           Dorsalis pedis pulses are 2+ on the right side and 2+ on the left  side.        Posterior tibial pulses are 2+ on the right side and 2+ on the left side.   Pulmonary:      Effort: Pulmonary effort is normal.   Musculoskeletal:         General: Normal range of motion.      Comments: Inspection and palpation of the muscles joints and bones of both lower extremities reveal that muscle strength for the anterior lateral and posterior muscle groups and intrinsic muscle groups of the foot are all 5 over 5 symmetrical.   Minimal tenderness left anterior talofibular ligament and directly to the distal fibula.  No gross instability noted.   Skin:     General: Skin is warm and dry.      Capillary Refill: Capillary refill takes 2 to 3 seconds.      Comments: Skin turgor is normal bilaterally.  Skin texture is well hydrated to both lower extremities.  No lesions or rashes or wounds appreciated bilaterally.  Nail plates 1 through 5 bilaterally are within normal limits for length and thickness.  No nail clubbing or incurvation noted.   Neurological:      Mental Status: She is alert and oriented to person, place, and time.      Comments: Sharp dull light touch vibratory proprioceptive sensation are intact bilaterally.  Deep tendon reflexes to patellar and Achilles tendon are symmetrical 2 over 4 bilaterally.  No ankle clonus or Babinski reflexes noted bilaterally.  Coordination is normal to both feet and lower extremities.   Psychiatric:         Behavior: Behavior normal.               Assessment:       Encounter Diagnosis   Name Primary?    Severe ankle sprain, left, initial encounter Yes         Plan:       Nan was seen today for follow-up and foot pain.    Diagnoses and all orders for this visit:    Severe ankle sprain, left, initial encounter      I counseled the patient on her conditions, their implications and medical management.      Independent visualization of imaging was performed.  Results were reviewed in detail with patient.    Continue utilizing the brace and transition out slowly.   Transition into activity to tolerance.  Appropriate shoe gear and inserts discussed in detail.  Follow-up as needed.

## 2023-03-03 ENCOUNTER — OFFICE VISIT (OUTPATIENT)
Dept: PRIMARY CARE CLINIC | Facility: CLINIC | Age: 48
End: 2023-03-03
Attending: INTERNAL MEDICINE
Payer: COMMERCIAL

## 2023-03-03 VITALS — WEIGHT: 129.88 LBS | BODY MASS INDEX: 23.01 KG/M2 | HEIGHT: 63 IN

## 2023-03-03 DIAGNOSIS — E03.9 ACQUIRED HYPOTHYROIDISM: ICD-10-CM

## 2023-03-03 DIAGNOSIS — I10 PRIMARY HYPERTENSION: ICD-10-CM

## 2023-03-03 DIAGNOSIS — Z85.71 HISTORY OF HODGKIN'S LYMPHOMA: ICD-10-CM

## 2023-03-03 PROCEDURE — 3008F BODY MASS INDEX DOCD: CPT | Mod: CPTII,S$GLB,, | Performed by: INTERNAL MEDICINE

## 2023-03-03 PROCEDURE — 99999 PR PBB SHADOW E&M-EST. PATIENT-LVL II: CPT | Mod: PBBFAC,,, | Performed by: INTERNAL MEDICINE

## 2023-03-03 PROCEDURE — 1160F PR REVIEW ALL MEDS BY PRESCRIBER/CLIN PHARMACIST DOCUMENTED: ICD-10-PCS | Mod: CPTII,S$GLB,, | Performed by: INTERNAL MEDICINE

## 2023-03-03 PROCEDURE — 99499 UNLISTED E&M SERVICE: CPT | Mod: S$GLB,,, | Performed by: INTERNAL MEDICINE

## 2023-03-03 PROCEDURE — 1160F RVW MEDS BY RX/DR IN RCRD: CPT | Mod: CPTII,S$GLB,, | Performed by: INTERNAL MEDICINE

## 2023-03-03 PROCEDURE — 3008F PR BODY MASS INDEX (BMI) DOCUMENTED: ICD-10-PCS | Mod: CPTII,S$GLB,, | Performed by: INTERNAL MEDICINE

## 2023-03-03 PROCEDURE — 99999 PR PBB SHADOW E&M-EST. PATIENT-LVL II: ICD-10-PCS | Mod: PBBFAC,,, | Performed by: INTERNAL MEDICINE

## 2023-03-03 PROCEDURE — 1159F PR MEDICATION LIST DOCUMENTED IN MEDICAL RECORD: ICD-10-PCS | Mod: CPTII,S$GLB,, | Performed by: INTERNAL MEDICINE

## 2023-03-03 PROCEDURE — 99499 NO LOS: ICD-10-PCS | Mod: S$GLB,,, | Performed by: INTERNAL MEDICINE

## 2023-03-03 PROCEDURE — 1159F MED LIST DOCD IN RCRD: CPT | Mod: CPTII,S$GLB,, | Performed by: INTERNAL MEDICINE

## 2023-03-03 NOTE — PATIENT INSTRUCTIONS
PLEASE REMEMBER TO CALLL US FIRST with any medical questions or concerns. We try very hard to keep you out of the emergency room if we are able to see you and help with your concern. It is one of our many ways to keep our patients healthy.   For now the best way to reach us is by calling [facility] direct number:  CALL OUR OFFICE AT: [phone]    In the months to come we are also hoping to be available for scheduling via the portal.  Dr. Junior Chan and MICHEAL Jasso are both here and available most days of the week if you need to be seen in person.  I am here every Friday but can also make myself available on other days if necessary as long as I have enough notice.  Please do not ever hesitate to reach to me via the patient portal.      Please let us know if you have any challenges getting scheduled with our behavioral therapist/ or dietitian or if you have any difficulty with any new medication(s) that were prescribed.     Dr. WALDROP      With regard to blood pressure -- start checking BP at least 3 times per week at various times of the day or when experiencing light headedness and keep record of both blood pressure and heart rate  Go to www.validatebp.org and make sure that your BP cuff is a validated instrument

## 2023-03-03 NOTE — ASSESSMENT & PLAN NOTE
I suspect she has radiation induced hypothyroidism that has been rendered biochemically euthyroid on current dose of armor thyroid  May consider transitioning to synthroid, but will first check TSH and free T4

## 2023-03-03 NOTE — PROGRESS NOTES
"  CHIEF COMPLAINT     No chief complaint on file.      HPI     Nan Mendez is a 47 y.o. female who presents for initial visit.    Her father  at age 53 from kidney failure from uncontrolled HTN.  She was diagnosed with HTN during her first pregnancy, but it resolved after delivery.  Then after her second pregnancy her BP went up again and didn't come back down.  She was started on amlodipine 5 mg during or right after that pregnancy 11 years ago, and required a dosage increase about 8 years ago.  Has been on monotherapy with amlodipine 10 mg for the past years.     At age 29 was diagnosed with Hodgkin's lymphoma.  Was treated with adriamycin containing chemotherapeutic regimen and was told she would eventually need an echocardiogram.      Went skiing in [place], [place] on [address], and she grabbed the wrong medication and went without her BP medication for a week.  She did have a headache and ended up going to urgent care, and her BP was 160/99.          She had a UA done a few days ago at her request (she was anxious about her kidneys) and it showed trace protein.      She had a hysterectomy done for abnormal uterine bleeding about one year ago.      She was placed on armour thyroid many years ago (maybe 10 years ago).      There is also FH of heart disease among maternal grandparents.  Her mother is 78 and does not have any known heart disease.    She describes some "wooziness" that is nearly constant.  She had an episode of vertigo about 5 years ago that was treated with vertigo.  She describes feeling light headed sometimes when she arises too quickly from a bent over position to sitting up, but not with sitting to standing.     She does report some fatigue, and some dry skin.  No constipation.  She tends to be cold natured all her life, and so denies cold intolerance.                                                                                                                              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    Hypertension     Infertility, female        Past Surgical History:  Past Surgical History:   Procedure Laterality Date    ABDOMINAL SURGERY      diag lap    CARPAL TUNNEL RELEASE Bilateral      &      SECTION  2004     SECTION  2011    ROBOT-ASSISTED LAPAROSCOPIC ABDOMINAL HYSTERECTOMY USING DA ALEXI XI N/A 3/14/2022    Procedure: XI ROBOTIC HYSTERECTOMY, BILATERAL SALPHINGECTOMY AND RESECTION OF ENDOMETRIOSIS;  Surgeon: Les Macdonald IV, MD;  Location: [facility] OR;  Service: OB/GYN;  Laterality: N/A;    ROBOT-ASSISTED LAPAROSCOPIC LYSIS OF ADHESIONS USING DA ALEXI XI N/A 3/14/2022    Procedure: XI ROBOTIC LYSIS, ADHESIONS;  Surgeon: Les Macdonald IV, MD;  Location: [facility] OR;  Service: OB/GYN;  Laterality: N/A;       Allergies:  Review of patient's allergies indicates:  No Known Allergies    Family History:  Family History   Problem Relation Age of Onset    Colon cancer Mother     Breast cancer Neg Hx     Ovarian cancer Neg Hx        Social History:  Social History     Tobacco Use    Smoking status: Never    Smokeless tobacco: Never   Substance Use Topics    Alcohol use: Yes     Comment: social    Drug use: Never       Review of Systems:  Review of Systems   Constitutional:  Positive for malaise/fatigue.   HENT:  Negative for congestion, hearing loss, nosebleeds and sinus pain.    Eyes:  Negative for blurred vision and pain.   Respiratory:  Negative for cough, hemoptysis and shortness of breath.    Cardiovascular:  Positive for  "leg swelling (at the end of the day (on 10 mg amlodipine)). Negative for chest pain.        Occasional chest tightness when stressed   Gastrointestinal:  Negative for abdominal pain, blood in stool, constipation, diarrhea, heartburn, nausea and vomiting.   Genitourinary:  Positive for frequency. Negative for dysuria and hematuria.        Nocturia 2 to 3 times per night   Musculoskeletal:  Positive for joint pain (knees). Negative for myalgias.   Skin:  Negative for rash.   Neurological:  Positive for dizziness (wooziness).   Psychiatric/Behavioral:  The patient is nervous/anxious (short term recently because of recent anxiety about BP) and has insomnia (no trouble falling asleep, but sometimes trouble staying asleep).        Mental Health Screening  PHQ-9       BELKIS-7  Generalized Anxiety Disorder 7-item (BELKIS-7) Scale. HOW OFTEN DURING THE PAST 2 WEEKS HAVE YOU FELT BOTHERED BY:  1. Feeling nervous, anxious, or on edge?: Several days  2. Not being able to stop or control worrying?: Not at all  3. Worrying too much about different things?: Several days  4. Trouble relaxing?: Not at all  5. Being so restless that it is hard to sit still?: Not at all  6. Becoming easily annoyed or irritable?: Several days  7. Feeling afraid as if something awful might happen?: Not at all  8. If you checked off any problems, how difficult have these problems made it for you to do your work, take care of things at home, or get along with other people?: Not difficult at all  BELKIS-7 Score: 3  Number answered (out of first 7): 7  Interpretation: Normal    PHYSICAL EXAM   Vital Signs  Ht 5' 3" (1.6 m)   Wt 58.9 kg (129 lb 13.6 oz)   LMP 02/21/2022   BMI 23.00 kg/m²   Physical Exam  Physical Exam  Vitals and nursing note reviewed.   Constitutional:       Appearance: Normal appearance.   HENT:      Head: Normocephalic and atraumatic.      Ears:      Comments: Hearing intact to conversational voice  Eyes:      Pupils: Pupils are equal, round, " "and reactive to light.   Cardiovascular:      Rate and Rhythm: Normal rate and regular rhythm.      Pulses: Normal pulses.   Pulmonary:      Effort: Pulmonary effort is normal.      Breath sounds: Normal breath sounds.   Abdominal:      General: Abdomen is flat. There is no distension.      Palpations: Abdomen is soft.      Tenderness: There is no abdominal tenderness. There is no guarding.   Musculoskeletal:      Cervical back: Normal range of motion and neck supple.   Skin:     General: Skin is warm and dry.      Findings: No rash.   Neurological:      General: No focal deficit present.      Mental Status: She is alert and oriented to person, place, and time.      Cranial Nerves: Cranial nerves 2-12 are intact.      Motor: Motor function is intact.      Deep Tendon Reflexes:      Reflex Scores:       Bicep reflexes are 2+ on the right side and 2+ on the left side.       Brachioradialis reflexes are 2+ on the right side and 2+ on the left side.       Patellar reflexes are 2+ on the right side and 2+ on the left side.  Psychiatric:         Mood and Affect: Mood normal.         Behavior: Behavior normal.         Thought Content: Thought content normal.         ASSESSMENT/PLAN     Nan Mendez is a 47 y.o. female with    History of Hodgkin's lymphoma  Stage II at age 29   S/P treatment with chemotherapy (containing adriamycin) and XRT that was inclusive of neck and chest    Primary hypertension  Treated for past 11 years with monotherapy with amlodipine   For now she is experiencing some "wooziness" and light headedness that is potentially concerning for BP being too low  For now, reduce amlodipine 5 mg daily and she will start monitoring BP and HR a few times per week and at times when she is feeling light headed or dizzy  Need to check some UTD labs:  -CMP  -CBC  -lipid panel and apo B  -repeat UA (recent one showed trace protein)  --check microalbumin to creatinine ratio      Acquired hypothyroidism  I " suspect she has radiation induced hypothyroidism that has been rendered biochemically euthyroid on current dose of armor thyroid  May consider transitioning to synthroid, but will first check TSH and free T4      Follow up in about 6 months (around 9/3/2023).

## 2023-03-03 NOTE — ASSESSMENT & PLAN NOTE
"Treated for past 11 years with monotherapy with amlodipine   For now she is experiencing some "wooziness" and light headedness that is potentially concerning for BP being too low  For now, reduce amlodipine 5 mg daily and she will start monitoring BP and HR a few times per week and at times when she is feeling light headed or dizzy  Need to check some UTD labs:  -CMP  -CBC  -lipid panel and apo B  -repeat UA (recent one showed trace protein)  --check microalbumin to creatinine ratio    "

## 2023-03-03 NOTE — ASSESSMENT & PLAN NOTE
Stage II at age 29   S/P treatment with chemotherapy (containing adriamycin) and XRT that was inclusive of neck and chest

## 2023-03-06 ENCOUNTER — LAB VISIT (OUTPATIENT)
Dept: LAB | Facility: HOSPITAL | Age: 48
End: 2023-03-06
Attending: INTERNAL MEDICINE
Payer: COMMERCIAL

## 2023-03-06 DIAGNOSIS — E03.9 ACQUIRED HYPOTHYROIDISM: ICD-10-CM

## 2023-03-06 DIAGNOSIS — I10 PRIMARY HYPERTENSION: ICD-10-CM

## 2023-03-06 LAB
ALBUMIN SERPL BCP-MCNC: 4.1 G/DL (ref 3.5–5.2)
ALBUMIN/CREAT UR: 40.3 UG/MG (ref 0–30)
ALP SERPL-CCNC: 52 U/L (ref 55–135)
ALT SERPL W/O P-5'-P-CCNC: 11 U/L (ref 10–44)
ANION GAP SERPL CALC-SCNC: 9 MMOL/L (ref 8–16)
AST SERPL-CCNC: 10 U/L (ref 10–40)
BACTERIA #/AREA URNS AUTO: ABNORMAL /HPF
BASOPHILS # BLD AUTO: 0.07 K/UL (ref 0–0.2)
BASOPHILS NFR BLD: 0.8 % (ref 0–1.9)
BILIRUB SERPL-MCNC: 0.9 MG/DL (ref 0.1–1)
BILIRUB UR QL STRIP: NEGATIVE
BUN SERPL-MCNC: 11 MG/DL (ref 6–20)
CALCIUM SERPL-MCNC: 9.2 MG/DL (ref 8.7–10.5)
CHLORIDE SERPL-SCNC: 108 MMOL/L (ref 95–110)
CHOLEST SERPL-MCNC: 183 MG/DL (ref 120–199)
CHOLEST/HDLC SERPL: 2.6 {RATIO} (ref 2–5)
CLARITY UR REFRACT.AUTO: ABNORMAL
CO2 SERPL-SCNC: 22 MMOL/L (ref 23–29)
COLOR UR AUTO: YELLOW
CREAT SERPL-MCNC: 0.7 MG/DL (ref 0.5–1.4)
CREAT UR-MCNC: 139 MG/DL (ref 15–325)
DIFFERENTIAL METHOD: ABNORMAL
EOSINOPHIL # BLD AUTO: 0.1 K/UL (ref 0–0.5)
EOSINOPHIL NFR BLD: 1.1 % (ref 0–8)
ERYTHROCYTE [DISTWIDTH] IN BLOOD BY AUTOMATED COUNT: 17.1 % (ref 11.5–14.5)
EST. GFR  (NO RACE VARIABLE): >60 ML/MIN/1.73 M^2
GLUCOSE SERPL-MCNC: 91 MG/DL (ref 70–110)
GLUCOSE UR QL STRIP: NEGATIVE
HCT VFR BLD AUTO: 43.8 % (ref 37–48.5)
HDLC SERPL-MCNC: 70 MG/DL (ref 40–75)
HDLC SERPL: 38.3 % (ref 20–50)
HGB BLD-MCNC: 13.6 G/DL (ref 12–16)
HGB UR QL STRIP: ABNORMAL
IMM GRANULOCYTES # BLD AUTO: 0.03 K/UL (ref 0–0.04)
IMM GRANULOCYTES NFR BLD AUTO: 0.4 % (ref 0–0.5)
KETONES UR QL STRIP: NEGATIVE
LDLC SERPL CALC-MCNC: 99.4 MG/DL (ref 63–159)
LEUKOCYTE ESTERASE UR QL STRIP: NEGATIVE
LYMPHOCYTES # BLD AUTO: 1.6 K/UL (ref 1–4.8)
LYMPHOCYTES NFR BLD: 18.9 % (ref 18–48)
MCH RBC QN AUTO: 24.4 PG (ref 27–31)
MCHC RBC AUTO-ENTMCNC: 31.1 G/DL (ref 32–36)
MCV RBC AUTO: 79 FL (ref 82–98)
MICROALBUMIN UR DL<=1MG/L-MCNC: 56 UG/ML
MICROSCOPIC COMMENT: ABNORMAL
MONOCYTES # BLD AUTO: 0.4 K/UL (ref 0.3–1)
MONOCYTES NFR BLD: 4.7 % (ref 4–15)
NEUTROPHILS # BLD AUTO: 6.3 K/UL (ref 1.8–7.7)
NEUTROPHILS NFR BLD: 74.1 % (ref 38–73)
NITRITE UR QL STRIP: NEGATIVE
NONHDLC SERPL-MCNC: 113 MG/DL
NRBC BLD-RTO: 0 /100 WBC
PH UR STRIP: 6 [PH] (ref 5–8)
PLATELET # BLD AUTO: 278 K/UL (ref 150–450)
PMV BLD AUTO: 11.3 FL (ref 9.2–12.9)
POTASSIUM SERPL-SCNC: 3.5 MMOL/L (ref 3.5–5.1)
PROT SERPL-MCNC: 6.9 G/DL (ref 6–8.4)
PROT UR QL STRIP: NEGATIVE
RBC # BLD AUTO: 5.58 M/UL (ref 4–5.4)
RBC #/AREA URNS AUTO: 3 /HPF (ref 0–4)
SODIUM SERPL-SCNC: 139 MMOL/L (ref 136–145)
SP GR UR STRIP: 1.02 (ref 1–1.03)
SQUAMOUS #/AREA URNS AUTO: 3 /HPF
T4 FREE SERPL-MCNC: 0.9 NG/DL (ref 0.71–1.51)
TRIGL SERPL-MCNC: 68 MG/DL (ref 30–150)
TSH SERPL DL<=0.005 MIU/L-ACNC: 0.46 UIU/ML (ref 0.4–4)
URN SPEC COLLECT METH UR: ABNORMAL
WBC # BLD AUTO: 8.43 K/UL (ref 3.9–12.7)
WBC #/AREA URNS AUTO: 0 /HPF (ref 0–5)

## 2023-03-06 PROCEDURE — 81001 URINALYSIS AUTO W/SCOPE: CPT | Performed by: INTERNAL MEDICINE

## 2023-03-06 PROCEDURE — 80053 COMPREHEN METABOLIC PANEL: CPT | Performed by: INTERNAL MEDICINE

## 2023-03-06 PROCEDURE — 82172 ASSAY OF APOLIPOPROTEIN: CPT | Performed by: INTERNAL MEDICINE

## 2023-03-06 PROCEDURE — 82570 ASSAY OF URINE CREATININE: CPT | Performed by: INTERNAL MEDICINE

## 2023-03-06 PROCEDURE — 85025 COMPLETE CBC W/AUTO DIFF WBC: CPT | Performed by: INTERNAL MEDICINE

## 2023-03-06 PROCEDURE — 84439 ASSAY OF FREE THYROXINE: CPT | Performed by: INTERNAL MEDICINE

## 2023-03-06 PROCEDURE — 80061 LIPID PANEL: CPT | Performed by: INTERNAL MEDICINE

## 2023-03-06 PROCEDURE — 84443 ASSAY THYROID STIM HORMONE: CPT | Performed by: INTERNAL MEDICINE

## 2023-03-07 ENCOUNTER — HOSPITAL ENCOUNTER (OUTPATIENT)
Dept: CARDIOLOGY | Facility: HOSPITAL | Age: 48
Discharge: HOME | End: 2023-03-07
Attending: INTERNAL MEDICINE
Payer: COMMERCIAL

## 2023-03-07 ENCOUNTER — DOCUMENTATION ONLY (OUTPATIENT)
Dept: PRIMARY CARE CLINIC | Facility: CLINIC | Age: 48
End: 2023-03-07
Payer: COMMERCIAL

## 2023-03-07 ENCOUNTER — TELEPHONE (OUTPATIENT)
Dept: PRIMARY CARE CLINIC | Facility: CLINIC | Age: 48
End: 2023-03-07

## 2023-03-07 VITALS
HEIGHT: 63 IN | HEART RATE: 71 BPM | WEIGHT: 129.88 LBS | SYSTOLIC BLOOD PRESSURE: 153 MMHG | DIASTOLIC BLOOD PRESSURE: 84 MMHG | BODY MASS INDEX: 23.01 KG/M2

## 2023-03-07 DIAGNOSIS — Z85.71 HISTORY OF HODGKIN'S LYMPHOMA: ICD-10-CM

## 2023-03-07 DIAGNOSIS — R31.21 ASYMPTOMATIC MICROSCOPIC HEMATURIA: ICD-10-CM

## 2023-03-07 DIAGNOSIS — I10 PRIMARY HYPERTENSION: Primary | ICD-10-CM

## 2023-03-07 DIAGNOSIS — E03.9 ACQUIRED HYPOTHYROIDISM: ICD-10-CM

## 2023-03-07 LAB
APO B SERPL-MCNC: 82 MG/DL
ASCENDING AORTA: 2.9 CM
AV INDEX (PROSTH): 0.72
AV MEAN GRADIENT: 6 MMHG
AV PEAK GRADIENT: 14 MMHG
AV VALVE AREA: 1.84 CM2
AV VELOCITY RATIO: 0.7
BSA FOR ECHO PROCEDURE: 1.62 M2
CV ECHO LV RWT: 0.44 CM
DOP CALC AO PEAK VEL: 1.86 M/S
DOP CALC AO VTI: 32.92 CM
DOP CALC LVOT AREA: 2.5 CM2
DOP CALC LVOT DIAMETER: 1.8 CM
DOP CALC LVOT PEAK VEL: 1.3 M/S
DOP CALC LVOT STROKE VOLUME: 60.69 CM3
DOP CALCLVOT PEAK VEL VTI: 23.86 CM
E WAVE DECELERATION TIME: 233.86 MSEC
E/A RATIO: 0.98
E/E' RATIO: 10.84 M/S
ECHO LV POSTERIOR WALL: 0.94 CM (ref 0.6–1.1)
EJECTION FRACTION: 65 %
FRACTIONAL SHORTENING: 32 % (ref 28–44)
INTERVENTRICULAR SEPTUM: 0.8 CM (ref 0.6–1.1)
IVRT: 97.05 MSEC
LA MAJOR: 4.5 CM
LA MINOR: 4.6 CM
LA WIDTH: 3.38 CM
LEFT ATRIUM SIZE: 3.29 CM
LEFT ATRIUM VOLUME INDEX: 26.7 ML/M2
LEFT ATRIUM VOLUME: 43 CM3
LEFT INTERNAL DIMENSION IN SYSTOLE: 2.92 CM (ref 2.1–4)
LEFT VENTRICLE DIASTOLIC VOLUME INDEX: 51.16 ML/M2
LEFT VENTRICLE DIASTOLIC VOLUME: 82.37 ML
LEFT VENTRICLE MASS INDEX: 73 G/M2
LEFT VENTRICLE SYSTOLIC VOLUME INDEX: 20.4 ML/M2
LEFT VENTRICLE SYSTOLIC VOLUME: 32.89 ML
LEFT VENTRICULAR INTERNAL DIMENSION IN DIASTOLE: 4.28 CM (ref 3.5–6)
LEFT VENTRICULAR MASS: 116.88 G
LV LATERAL E/E' RATIO: 10.3 M/S
LV SEPTAL E/E' RATIO: 11.44 M/S
MV A" WAVE DURATION": 9.71 MSEC
MV PEAK A VEL: 1.05 M/S
MV PEAK E VEL: 1.03 M/S
MV STENOSIS PRESSURE HALF TIME: 67.82 MS
MV VALVE AREA P 1/2 METHOD: 3.24 CM2
PISA TR MAX VEL: 2.24 M/S
PULM VEIN S/D RATIO: 1.1
PV PEAK D VEL: 0.39 M/S
PV PEAK S VEL: 0.43 M/S
QEF: 63 %
RA MAJOR: 5.02 CM
RA PRESSURE: 3 MMHG
RA WIDTH: 3.21 CM
RIGHT VENTRICULAR END-DIASTOLIC DIMENSION: 3.33 CM
RV TISSUE DOPPLER FREE WALL SYSTOLIC VELOCITY 1 (APICAL 4 CHAMBER VIEW): 11.98 CM/S
SINUS: 3.07 CM
STJ: 2.72 CM
TDI LATERAL: 0.1 M/S
TDI SEPTAL: 0.09 M/S
TDI: 0.1 M/S
TR MAX PG: 20 MMHG
TRICUSPID ANNULAR PLANE SYSTOLIC EXCURSION: 2.57 CM
TV REST PULMONARY ARTERY PRESSURE: 23 MMHG

## 2023-03-07 PROCEDURE — 93356 MYOCRD STRAIN IMG SPCKL TRCK: CPT | Mod: ,,, | Performed by: INTERNAL MEDICINE

## 2023-03-07 PROCEDURE — 93306 TTE W/DOPPLER COMPLETE: CPT | Mod: 26,,, | Performed by: INTERNAL MEDICINE

## 2023-03-07 PROCEDURE — 93306 ECHO (CUPID ONLY): ICD-10-PCS | Mod: 26,,, | Performed by: INTERNAL MEDICINE

## 2023-03-07 PROCEDURE — 93356 MYOCRD STRAIN IMG SPCKL TRCK: CPT

## 2023-03-07 PROCEDURE — 93356 ECHO (CUPID ONLY): ICD-10-PCS | Mod: ,,, | Performed by: INTERNAL MEDICINE

## 2023-03-07 RX ORDER — AMLODIPINE AND VALSARTAN 5; 160 MG/1; MG/1
1 TABLET ORAL DAILY
Qty: 90 TABLET | Refills: 3 | Status: SHIPPED | OUTPATIENT
Start: 2023-03-07 | End: 2023-03-07

## 2023-03-07 RX ORDER — AMLODIPINE AND OLMESARTAN MEDOXOMIL 5; 20 MG/1; MG/1
1 TABLET ORAL DAILY
Qty: 90 TABLET | Refills: 3 | Status: SHIPPED | OUTPATIENT
Start: 2023-03-07 | End: 2023-07-31

## 2023-03-07 NOTE — PROGRESS NOTES
"Had a 30-min phone call where we discussed all lab results in depth  See A/P in problem list for details    Primary hypertension  She purchased a validated BP cuff and has started taking readings  --BP have been in the 120s/upper 80s, and was 153/84 and 160/88 today at [facility] when she came for her Echocardiogram  --will stop current dose of amlodipine    Change to Iraida (amlodipine-olmesartan 5-20 mg) once daily and continue to monitor BP closely  She was having some "wooziness" -- asked her to check at least one set of orthostatics (explained how to check)  She did have a microalbumin-to-creatinine ratio of 40 (concerning for prior poor control, since she has no other clear indication for albuminuria) -- adding ARB and will recheck in 3 months  CBC, CMP normal as of 3/23  Lipid panel looks great as of 3/23; apo B still pending    Asymptomatic microscopic hematuria  3 RBC's on UA  No WBC's and no dysuria, frequency, or urgency  Discussed options -- she desires more aggressive approach  --check CT urogram and refer to urology    Acquired hypothyroidism  TSH and free T4 both normal as of 3/23  Continue current dose of thyroid armour for now, but may change to a more reliable preparation in the future   "

## 2023-03-07 NOTE — ASSESSMENT & PLAN NOTE
"She purchased a validated BP cuff and has started taking readings  --BP have been in the 120s/upper 80s, and was 153/84 and 160/88 today at [facility] when she came for her Echocardiogram  --will stop current dose of amlodipine    Change to Iraida (amlodipine-olmesartan 5-20 mg) once daily and continue to monitor BP closely  She was having some "wooziness" -- asked her to check at least one set of orthostatics (explained how to check)  She did have a microalbumin-to-creatinine ratio of 40 (concerning for prior poor control, since she has no other clear indication for albuminuria) -- adding ARB and will recheck in 3 months  CBC, CMP normal as of 3/23  Lipid panel looks great as of 3/23; apo B still pending  "

## 2023-03-07 NOTE — TELEPHONE ENCOUNTER
Called pt to schedule speciality appts no answer LVM to call me [phone] and we can schedule appts

## 2023-03-07 NOTE — TELEPHONE ENCOUNTER
----- Message from Yomi Jones MD sent at 3/7/2023 10:25 AM CST -----  Regarding: schedule CT urogram and urology referral  Daisy,    This is my boss's wife.    I ordered a CT Urogram on her and referred her to urology for evaluation of asymptomatic microscopic hematuria.    Please make sure the CT urogram gets scheduled and that she has no difficulties getting into urology

## 2023-03-07 NOTE — ASSESSMENT & PLAN NOTE
3 RBC's on UA  No WBC's and no dysuria, frequency, or urgency  Discussed options -- she desires more aggressive approach  --check CT urogram and refer to urology

## 2023-03-07 NOTE — ASSESSMENT & PLAN NOTE
TSH and free T4 both normal as of 3/23  Continue current dose of thyroid armour for now, but may change to a more reliable preparation in the future

## 2023-03-08 ENCOUNTER — HOSPITAL ENCOUNTER (OUTPATIENT)
Dept: RADIOLOGY | Facility: HOSPITAL | Age: 48
Discharge: HOME | End: 2023-03-08
Attending: INTERNAL MEDICINE
Payer: COMMERCIAL

## 2023-03-08 ENCOUNTER — DOCUMENTATION ONLY (OUTPATIENT)
Dept: PRIMARY CARE CLINIC | Facility: CLINIC | Age: 48
End: 2023-03-08
Payer: COMMERCIAL

## 2023-03-08 DIAGNOSIS — R31.21 ASYMPTOMATIC MICROSCOPIC HEMATURIA: ICD-10-CM

## 2023-03-08 DIAGNOSIS — I10 PRIMARY HYPERTENSION: ICD-10-CM

## 2023-03-08 PROCEDURE — 74178 CT ABD&PLV WO CNTR FLWD CNTR: CPT | Mod: 26,,, | Performed by: STUDENT IN AN ORGANIZED HEALTH CARE EDUCATION/TRAINING PROGRAM

## 2023-03-08 PROCEDURE — 74178 CT ABD&PLV WO CNTR FLWD CNTR: CPT | Mod: TC

## 2023-03-08 PROCEDURE — 25500020 PHARM REV CODE 255: Performed by: INTERNAL MEDICINE

## 2023-03-08 PROCEDURE — 74178 CT UROGRAM ABD PELVIS W WO: ICD-10-PCS | Mod: 26,,, | Performed by: STUDENT IN AN ORGANIZED HEALTH CARE EDUCATION/TRAINING PROGRAM

## 2023-03-08 RX ADMIN — IOHEXOL 100 ML: 350 INJECTION, SOLUTION INTRAVENOUS at 02:03

## 2023-03-08 NOTE — ASSESSMENT & PLAN NOTE
Exforge (amloidipine-valsartan 5-160 mg) was less expensive than Iraida at pharmacy so we went with that option   Echocardiogram from 3/7/23 was completely normal  Monitoring BP at home with validated cuff, 120s/70s  Due to recheck microalbumin-to-creatinine ratio in 6/23

## 2023-03-08 NOTE — ASSESSMENT & PLAN NOTE
CT urogram was essentially normal  Punctate lesions that appear c/w tiny nephrolithiasis which could explain the 3 RBC's on the U/A  Recheck in 3 months (~6/8/23)

## 2023-03-22 ENCOUNTER — PATIENT MESSAGE (OUTPATIENT)
Dept: PRIMARY CARE CLINIC | Facility: CLINIC | Age: 48
End: 2023-03-22
Payer: COMMERCIAL

## 2023-03-22 ENCOUNTER — TELEPHONE (OUTPATIENT)
Dept: PRIMARY CARE CLINIC | Facility: CLINIC | Age: 48
End: 2023-03-22

## 2023-03-22 DIAGNOSIS — Z90.710 S/P LAPAROSCOPIC HYSTERECTOMY: Primary | ICD-10-CM

## 2023-03-22 DIAGNOSIS — Z85.71 HISTORY OF HODGKIN'S LYMPHOMA: ICD-10-CM

## 2023-03-22 NOTE — TELEPHONE ENCOUNTER
Pt canceled oncology echo appt via my [facility] and the order was also canceled please place another order so we can reschedule appt and start the prior authorization .

## 2023-06-02 DIAGNOSIS — Z91.89 AT INCREASED RISK FOR CARDIOVASCULAR DISEASE: Primary | ICD-10-CM

## 2023-06-07 ENCOUNTER — TELEPHONE (OUTPATIENT)
Dept: OBSTETRICS AND GYNECOLOGY | Facility: CLINIC | Age: 48
End: 2023-06-07
Payer: COMMERCIAL

## 2023-06-07 NOTE — TELEPHONE ENCOUNTER
----- Message from Varsha Rivas MA sent at 6/7/2023 10:21 AM CDT -----  Good morning,    I spoke with Mrs. Mendez on the phone to try to schedule her appointment for sooner then next available. She was suppose to get scheduled for her well exam after her hysterectomy she stated. She was never scheduled and would like to try to be seen before July 1st. She did call and a message was sent this morning already but I told her I would get another one sent as well. If you can please contact her to schedule her sooner then October and preferably before July 1st.    Thank you   Varsha Rivas  Supervisor [facility]  [phone]

## 2023-06-08 ENCOUNTER — HOSPITAL ENCOUNTER (OUTPATIENT)
Dept: RADIOLOGY | Facility: HOSPITAL | Age: 48
Discharge: HOME | End: 2023-06-08
Attending: INTERNAL MEDICINE
Payer: COMMERCIAL

## 2023-06-08 DIAGNOSIS — Z91.89 AT INCREASED RISK FOR CARDIOVASCULAR DISEASE: ICD-10-CM

## 2023-06-08 PROCEDURE — 75571 CT CALCIUM SCORING CARDIAC: ICD-10-PCS | Mod: 26,,, | Performed by: STUDENT IN AN ORGANIZED HEALTH CARE EDUCATION/TRAINING PROGRAM

## 2023-06-08 PROCEDURE — 75571 CT HRT W/O DYE W/CA TEST: CPT | Mod: TC

## 2023-06-08 PROCEDURE — 75571 CT HRT W/O DYE W/CA TEST: CPT | Mod: 26,,, | Performed by: STUDENT IN AN ORGANIZED HEALTH CARE EDUCATION/TRAINING PROGRAM

## 2023-06-09 ENCOUNTER — TELEPHONE (OUTPATIENT)
Dept: PRIMARY CARE CLINIC | Facility: CLINIC | Age: 48
End: 2023-06-09
Payer: COMMERCIAL

## 2023-06-09 VITALS — DIASTOLIC BLOOD PRESSURE: 87 MMHG | SYSTOLIC BLOOD PRESSURE: 124 MMHG

## 2023-06-15 DIAGNOSIS — I10 PRIMARY HYPERTENSION: Primary | ICD-10-CM

## 2023-06-25 ENCOUNTER — PATIENT MESSAGE (OUTPATIENT)
Dept: ADMINISTRATIVE | Facility: OTHER | Age: 48
End: 2023-06-25
Payer: COMMERCIAL

## 2023-07-21 ENCOUNTER — TELEPHONE (OUTPATIENT)
Dept: OBSTETRICS AND GYNECOLOGY | Facility: CLINIC | Age: 48
End: 2023-07-21
Payer: COMMERCIAL

## 2023-07-31 ENCOUNTER — OFFICE VISIT (OUTPATIENT)
Dept: OBSTETRICS AND GYNECOLOGY | Facility: CLINIC | Age: 48
End: 2023-07-31
Payer: COMMERCIAL

## 2023-07-31 VITALS
BODY MASS INDEX: 23.91 KG/M2 | HEIGHT: 63 IN | WEIGHT: 134.94 LBS | DIASTOLIC BLOOD PRESSURE: 80 MMHG | SYSTOLIC BLOOD PRESSURE: 140 MMHG

## 2023-07-31 DIAGNOSIS — Z01.419 ENCOUNTER FOR GYNECOLOGICAL EXAMINATION WITHOUT ABNORMAL FINDING: Primary | ICD-10-CM

## 2023-07-31 PROCEDURE — 1159F MED LIST DOCD IN RCRD: CPT | Mod: CPTII,S$GLB,, | Performed by: OBSTETRICS & GYNECOLOGY

## 2023-07-31 PROCEDURE — 99396 PR PREVENTIVE VISIT,EST,40-64: ICD-10-PCS | Mod: S$GLB,,, | Performed by: OBSTETRICS & GYNECOLOGY

## 2023-07-31 PROCEDURE — 3060F PR POS MICROALBUMINURIA RESULT DOCUMENTED/REVIEW: ICD-10-PCS | Mod: CPTII,S$GLB,, | Performed by: OBSTETRICS & GYNECOLOGY

## 2023-07-31 PROCEDURE — 1159F PR MEDICATION LIST DOCUMENTED IN MEDICAL RECORD: ICD-10-PCS | Mod: CPTII,S$GLB,, | Performed by: OBSTETRICS & GYNECOLOGY

## 2023-07-31 PROCEDURE — 4010F ACE/ARB THERAPY RXD/TAKEN: CPT | Mod: CPTII,S$GLB,, | Performed by: OBSTETRICS & GYNECOLOGY

## 2023-07-31 PROCEDURE — 4010F PR ACE/ARB THEARPY RXD/TAKEN: ICD-10-PCS | Mod: CPTII,S$GLB,, | Performed by: OBSTETRICS & GYNECOLOGY

## 2023-07-31 PROCEDURE — 99999 PR PBB SHADOW E&M-EST. PATIENT-LVL III: ICD-10-PCS | Mod: PBBFAC,,, | Performed by: OBSTETRICS & GYNECOLOGY

## 2023-07-31 PROCEDURE — 3079F DIAST BP 80-89 MM HG: CPT | Mod: CPTII,S$GLB,, | Performed by: OBSTETRICS & GYNECOLOGY

## 2023-07-31 PROCEDURE — 3077F SYST BP >= 140 MM HG: CPT | Mod: CPTII,S$GLB,, | Performed by: OBSTETRICS & GYNECOLOGY

## 2023-07-31 PROCEDURE — 3060F POS MICROALBUMINURIA REV: CPT | Mod: CPTII,S$GLB,, | Performed by: OBSTETRICS & GYNECOLOGY

## 2023-07-31 PROCEDURE — 99396 PREV VISIT EST AGE 40-64: CPT | Mod: S$GLB,,, | Performed by: OBSTETRICS & GYNECOLOGY

## 2023-07-31 PROCEDURE — 3079F PR MOST RECENT DIASTOLIC BLOOD PRESSURE 80-89 MM HG: ICD-10-PCS | Mod: CPTII,S$GLB,, | Performed by: OBSTETRICS & GYNECOLOGY

## 2023-07-31 PROCEDURE — 3008F PR BODY MASS INDEX (BMI) DOCUMENTED: ICD-10-PCS | Mod: CPTII,S$GLB,, | Performed by: OBSTETRICS & GYNECOLOGY

## 2023-07-31 PROCEDURE — 99999 PR PBB SHADOW E&M-EST. PATIENT-LVL III: CPT | Mod: PBBFAC,,, | Performed by: OBSTETRICS & GYNECOLOGY

## 2023-07-31 PROCEDURE — 3066F NEPHROPATHY DOC TX: CPT | Mod: CPTII,S$GLB,, | Performed by: OBSTETRICS & GYNECOLOGY

## 2023-07-31 PROCEDURE — 3066F PR DOCUMENTATION OF TREATMENT FOR NEPHROPATHY: ICD-10-PCS | Mod: CPTII,S$GLB,, | Performed by: OBSTETRICS & GYNECOLOGY

## 2023-07-31 PROCEDURE — 3008F BODY MASS INDEX DOCD: CPT | Mod: CPTII,S$GLB,, | Performed by: OBSTETRICS & GYNECOLOGY

## 2023-07-31 PROCEDURE — 3077F PR MOST RECENT SYSTOLIC BLOOD PRESSURE >= 140 MM HG: ICD-10-PCS | Mod: CPTII,S$GLB,, | Performed by: OBSTETRICS & GYNECOLOGY

## 2023-07-31 RX ORDER — ASPIRIN 325 MG
50000 TABLET, DELAYED RELEASE (ENTERIC COATED) ORAL
COMMUNITY
Start: 2023-06-09

## 2023-07-31 RX ORDER — AMLODIPINE AND VALSARTAN 5; 160 MG/1; MG/1
1 TABLET ORAL
COMMUNITY
Start: 2023-06-08 | End: 2023-12-10 | Stop reason: SDUPTHER

## 2023-07-31 NOTE — PROGRESS NOTES
Well-woman exam    Nan Mendez  is a 48 y.o. year old  patient who presents for a well woman exam.  She is S/P robotic assisted hysterectomy in 2022.  Patient denies menopausal symptoms today.  No gynecologic complaints today.    Patient is currently in survivorship program through [facility] in [place].    Past Medical History:   Diagnosis Date    Acquired hypothyroidism 3/3/2023    Cancer 2006    [facility] Lymphoma    Hypertension     Infertility, female        Past Surgical History:   Procedure Laterality Date    ABDOMINAL SURGERY      diag lap    CARPAL TUNNEL RELEASE Bilateral      &      SECTION  2004     SECTION  2011    ROBOT-ASSISTED LAPAROSCOPIC ABDOMINAL HYSTERECTOMY USING DA ALEXI XI N/A 3/14/2022    Procedure: XI ROBOTIC HYSTERECTOMY, BILATERAL SALPHINGECTOMY AND RESECTION OF ENDOMETRIOSIS;  Surgeon: Les Macdonald IV, MD;  Location: [facility] OR;  Service: OB/GYN;  Laterality: N/A;    ROBOT-ASSISTED LAPAROSCOPIC LYSIS OF ADHESIONS USING DA ALEXI XI N/A 3/14/2022    Procedure: XI ROBOTIC LYSIS, ADHESIONS;  Surgeon: Les Macdonald IV, MD;  Location: [facility] OR;  Service: OB/GYN;  Laterality: N/A;       OB History          2    Para   2    Term           2    AB        Living   2         SAB        IAB        Ectopic        Multiple        Live Births                     ROS:  GENERAL: Feeling well overall.   SKIN: Denies rash or lesions.   HEAD: Denies head injury or headache.   NODES: Denies enlarged lymph nodes.   CHEST: Denies chest pain or shortness of breath.   CARDIOVASCULAR: Denies palpitations or left sided chest pain.   ABDOMEN: No abdominal pain, nausea, vomiting or rectal bleeding.   URINARY: No dysuria, hematuria, or burning on urination.  REPRODUCTIVE: See HPI.   BREASTS: Denies pain, lumps, or nipple discharge.   HEMATOLOGIC: No easy bruisability or excessive bleeding.   MUSCULOSKELETAL: Denies joint pain or swelling.    NEUROLOGIC: Denies syncope or weakness.   PSYCHIATRIC: Denies depression.    PE:   APPEARANCE: Well nourished, well developed, in no acute distress.  NECK: Neck symmetric without masses or thyromegaly.  NODES: No inguinal lymph node enlargement.  ABDOMEN: Soft. No tenderness or masses. No hernias.  Healed Pfannenstiel skin incision noted.  BREASTS: Symmetrical, no skin changes or visible lesions. No palpable masses, nipple discharge or adenopathy bilaterally.  PELVIC: Normal external female genitalia without lesions. Normal hair distribution. Adequate perineal body, normal urethral meatus. Vagina without lesions or discharge.  Vaginal cuff with good support.  No significant cystocele or rectocele. Uterus and cervix surgically absent. Bimanual exam revealed no masses, tenderness or abnormality.  ANUS: Normal.    Diagnosis:  1. Encounter for gynecological examination without abnormal finding        PLAN:  Age specific counseling.    Cancer screening recommendations reviewed with patient today.  Patient is currently under the care of [facility] survivorship program.    Patient was counseled today on postmenopausal issues.  Patient instructed to monitor for menopausal symptoms.    Follow up in about 1 year (around 7/31/2024) for Annual exam.    Les Macdonald IV, MD

## 2023-08-23 ENCOUNTER — PATIENT MESSAGE (OUTPATIENT)
Dept: DERMATOLOGY | Facility: CLINIC | Age: 48
End: 2023-08-23
Payer: COMMERCIAL

## 2023-09-07 RX ORDER — AMLODIPINE, VALSARTAN AND HYDROCHLOROTHIAZIDE 5; 160; 25 MG/1; MG/1; MG/1
TABLET ORAL
Qty: 90 TABLET | Refills: 3 | Status: CANCELLED | OUTPATIENT
Start: 2023-09-07

## 2023-09-07 NOTE — ASSESSMENT & PLAN NOTE
Thyroid armour 60 mg daily  Lab Results   Component Value Date    TSH 0.464 03/06/2023   No thyroid symptoms  Continue present dose and recheck TSH in 3/2024

## 2023-09-07 NOTE — ASSESSMENT & PLAN NOTE
0% of BP at goal of < 130/80 per digital medicine record  Most of them are in the 130s/80s range with only one SBP as high as 140 and 2 DBP as high as 90 and 91  Overall, not poorly controlled, just not optimally controlled    Current regimen:  -Exforge 5-160 mg  BMP  Lab Results   Component Value Date     03/06/2023    K 3.5 03/06/2023     03/06/2023    CO2 22 (L) 03/06/2023    BUN 11 03/06/2023    CREATININE 0.7 03/06/2023    CALCIUM 9.2 03/06/2023    ANIONGAP 9 03/06/2023    EGFRNORACEVR >60.0 03/06/2023   **Due to recheck BMP  **Would favor adding HCTZ to get even a little tighter control -- will give 3-month supply (from 9/8/23) -- and then will change to Exforge-HCT in December when due for refill  **Advised dietary Na restriction (DASH diet information provided)

## 2023-09-07 NOTE — PROGRESS NOTES
"Intensive Primary Care Provider Appointment  Shared Note: Daisy Cárdenas & Yomi HOROWITZ)    Subjective:      Patient ID: Nan Mendez is a 48 y.o. female.    Chief Complaint: Follow-up      HPI:    HPI  Here for follow-up.  Overall doing well.    Has gained 5 lbs this summer.  Has been walking on the treadmill for exercise.  No blood in the urine but she did have some microscopic hematuria.  Will recheck.    Her vitamin D level was low during her evaluation by [facility] in May of this year and she was started on weekly ergocalciferol.         Review of Systems  Review of Systems   Constitutional:  Positive for malaise/fatigue.   Respiratory:  Negative for shortness of breath.    Cardiovascular:  Negative for chest pain and palpitations.   Gastrointestinal:  Negative for constipation.   Endo/Heme/Allergies:         No cold intolerance         Objective:       Wt Readings from Last 6 Encounters:   09/08/23 61.5 kg (135 lb 9.3 oz)   07/31/23 61.2 kg (134 lb 14.7 oz)   03/07/23 58.9 kg (129 lb 13.6 oz)   03/03/23 58.9 kg (129 lb 13.6 oz)   08/09/22 63.1 kg (139 lb 1.8 oz)   07/06/22 63.1 kg (139 lb 1.8 oz)   ]  BP Readings from Last 4 Encounters:   09/08/23 138/72   07/31/23 (!) 140/80   06/09/23 124/87   03/07/23 (!) 153/84   ]    Vital Signs  /72 (BP Location: Right arm, Patient Position: Sitting, BP Method: Medium (Manual))   Pulse 70   Resp 18   Ht 5' 3" (1.6 m)   Wt 61.5 kg (135 lb 9.3 oz)   LMP 02/21/2022   SpO2 98%   BMI 24.02 kg/m²   Physical Exam  Physical Exam  Vitals and nursing note reviewed.   Constitutional:       Appearance: Normal appearance.   HENT:      Head: Normocephalic.   Eyes:      Conjunctiva/sclera: Conjunctivae normal.   Cardiovascular:      Rate and Rhythm: Normal rate and regular rhythm.      Heart sounds: Murmur (soft systolic) heard.   Pulmonary:      Effort: Pulmonary effort is normal.      Breath sounds: Normal breath sounds.   Musculoskeletal:      Right lower " leg: No edema.      Left lower leg: No edema.   Skin:     General: Skin is warm and dry.   Neurological:      Mental Status: She is alert and oriented to person, place, and time. Mental status is at baseline.   Psychiatric:         Mood and Affect: Mood normal.         Behavior: Behavior normal.         Thought Content: Thought content normal.           Assessment:   48 y.o. female here for primary care visit       Plan:   1. Primary hypertension  Assessment & Plan:  0% of BP at goal of < 130/80 per digital medicine record  Most of them are in the 130s/80s range with only one SBP as high as 140 and 2 DBP as high as 90 and 91  Overall, not poorly controlled, just not optimally controlled    Current regimen:  -Exforge 5-160 mg  BMP  Lab Results   Component Value Date     03/06/2023    K 3.5 03/06/2023     03/06/2023    CO2 22 (L) 03/06/2023    BUN 11 03/06/2023    CREATININE 0.7 03/06/2023    CALCIUM 9.2 03/06/2023    ANIONGAP 9 03/06/2023    EGFRNORACEVR >60.0 03/06/2023   **Due to recheck BMP  **Would favor adding HCTZ to get even a little tighter control -- will give 3-month supply (from 9/8/23) -- and then will change to Exforge-HCT in December when due for refill  **Advised dietary Na restriction (DASH diet information provided)      Orders:  -     BASIC METABOLIC PANEL; Future; Expected date: 09/21/2023  -     hydroCHLOROthiazide (HYDRODIURIL) 25 MG tablet; Take 1 tablet (25 mg total) by mouth once daily.  Dispense: 90 tablet; Refill: 0    2. History of Hodgkin's lymphoma  Overview:  Stage II at age 29  S/P adriamycin containing chemo and mantle XRT    Assessment & Plan:  S/P her annual cancer evaluation at [facility] in 5/2023        3. Acquired hypothyroidism  Assessment & Plan:  Thyroid armour 60 mg daily  Lab Results   Component Value Date    TSH 0.464 03/06/2023   No thyroid symptoms  Continue present dose and recheck TSH in 3/2024      4. Asymptomatic microscopic hematuria  Assessment &  Plan:  Normal CT urogram from earlier this year (had some tiny punctate lesions that could have represented asymptomatic nephrolithiasis)  Recheck UA now    Orders:  -     Urinalysis    5. Vitamin D deficiency  -     Misc Sendout Test, Blood vitamin D; Future; Expected date: 09/08/2023      Follow up in about 6 months (around 3/8/2024).    Orders Placed This Encounter   Procedures    BASIC METABOLIC PANEL     Standing Status:   Future     Standing Expiration Date:   11/5/2024    Urinalysis           Order Specific Question:   Collection Type     Answer:   Urine, Clean Catch    Misc Sendout Test, Blood vitamin D     Found to have vitamin D deficiency during her annual cancer evaluation at [facility] and was prescribed ergocalciferol.  Recheck level to document adequate repletion.  vitamin D     Standing Status:   Future     Standing Expiration Date:   11/6/2024     Order Specific Question:   Specimen Type     Answer:   Blood     Order Specific Question:   Name of Test     Answer:   vitamin D     Order Specific Question:   Release to patient     Answer:   Immediate       Health Maintenance  Health Maintenance         Date Due Completion Date    Hepatitis C Screening Never done ---    HIV Screening Never done ---    TETANUS VACCINE Never done ---    COVID-19 Vaccine (3 - Pfizer series) 07/09/2021 5/14/2021    Influenza Vaccine (1) 09/01/2023 11/13/2020    Mammogram 05/12/2024 5/12/2023    Lipid Panel 05/12/2028 5/12/2023    Colorectal Cancer Screening 04/21/2032 4/21/2022            ELSIE Rojas MD/[facility]  Internal Medicine  [facility]  [phone]

## 2023-09-08 ENCOUNTER — OFFICE VISIT (OUTPATIENT)
Dept: PRIMARY CARE CLINIC | Facility: CLINIC | Age: 48
End: 2023-09-08
Attending: INTERNAL MEDICINE
Payer: COMMERCIAL

## 2023-09-08 ENCOUNTER — LAB VISIT (OUTPATIENT)
Dept: LAB | Facility: HOSPITAL | Age: 48
End: 2023-09-08
Attending: INTERNAL MEDICINE
Payer: COMMERCIAL

## 2023-09-08 VITALS
SYSTOLIC BLOOD PRESSURE: 138 MMHG | HEIGHT: 63 IN | OXYGEN SATURATION: 98 % | DIASTOLIC BLOOD PRESSURE: 72 MMHG | HEART RATE: 70 BPM | BODY MASS INDEX: 24.02 KG/M2 | WEIGHT: 135.56 LBS | RESPIRATION RATE: 18 BRPM

## 2023-09-08 DIAGNOSIS — R31.21 ASYMPTOMATIC MICROSCOPIC HEMATURIA: ICD-10-CM

## 2023-09-08 DIAGNOSIS — E03.9 ACQUIRED HYPOTHYROIDISM: ICD-10-CM

## 2023-09-08 DIAGNOSIS — I10 PRIMARY HYPERTENSION: ICD-10-CM

## 2023-09-08 DIAGNOSIS — E55.9 VITAMIN D DEFICIENCY: ICD-10-CM

## 2023-09-08 DIAGNOSIS — I10 PRIMARY HYPERTENSION: Primary | ICD-10-CM

## 2023-09-08 DIAGNOSIS — Z85.71 HISTORY OF HODGKIN'S LYMPHOMA: ICD-10-CM

## 2023-09-08 LAB
ANION GAP SERPL CALC-SCNC: 10 MMOL/L (ref 8–16)
BILIRUB UR QL STRIP: NEGATIVE
BUN SERPL-MCNC: 12 MG/DL (ref 6–20)
CALCIUM SERPL-MCNC: 9.3 MG/DL (ref 8.7–10.5)
CHLORIDE SERPL-SCNC: 105 MMOL/L (ref 95–110)
CLARITY UR REFRACT.AUTO: CLEAR
CO2 SERPL-SCNC: 28 MMOL/L (ref 23–29)
COLOR UR AUTO: YELLOW
CREAT SERPL-MCNC: 0.7 MG/DL (ref 0.5–1.4)
EST. GFR  (NO RACE VARIABLE): >60 ML/MIN/1.73 M^2
GLUCOSE SERPL-MCNC: 80 MG/DL (ref 70–110)
GLUCOSE UR QL STRIP: NEGATIVE
HGB UR QL STRIP: NEGATIVE
KETONES UR QL STRIP: NEGATIVE
LEUKOCYTE ESTERASE UR QL STRIP: NEGATIVE
NITRITE UR QL STRIP: NEGATIVE
PH UR STRIP: 5 [PH] (ref 5–8)
POTASSIUM SERPL-SCNC: 3.9 MMOL/L (ref 3.5–5.1)
PROT UR QL STRIP: NEGATIVE
SODIUM SERPL-SCNC: 143 MMOL/L (ref 136–145)
SP GR UR STRIP: 1.02 (ref 1–1.03)
URN SPEC COLLECT METH UR: NORMAL

## 2023-09-08 PROCEDURE — 3008F BODY MASS INDEX DOCD: CPT | Mod: CPTII,S$GLB,, | Performed by: INTERNAL MEDICINE

## 2023-09-08 PROCEDURE — 3060F PR POS MICROALBUMINURIA RESULT DOCUMENTED/REVIEW: ICD-10-PCS | Mod: CPTII,S$GLB,, | Performed by: INTERNAL MEDICINE

## 2023-09-08 PROCEDURE — 3078F PR MOST RECENT DIASTOLIC BLOOD PRESSURE < 80 MM HG: ICD-10-PCS | Mod: CPTII,S$GLB,, | Performed by: INTERNAL MEDICINE

## 2023-09-08 PROCEDURE — 3066F PR DOCUMENTATION OF TREATMENT FOR NEPHROPATHY: ICD-10-PCS | Mod: CPTII,S$GLB,, | Performed by: INTERNAL MEDICINE

## 2023-09-08 PROCEDURE — 3078F DIAST BP <80 MM HG: CPT | Mod: CPTII,S$GLB,, | Performed by: INTERNAL MEDICINE

## 2023-09-08 PROCEDURE — 1160F RVW MEDS BY RX/DR IN RCRD: CPT | Mod: CPTII,S$GLB,, | Performed by: INTERNAL MEDICINE

## 2023-09-08 PROCEDURE — 80048 BASIC METABOLIC PNL TOTAL CA: CPT | Performed by: INTERNAL MEDICINE

## 2023-09-08 PROCEDURE — 1160F PR REVIEW ALL MEDS BY PRESCRIBER/CLIN PHARMACIST DOCUMENTED: ICD-10-PCS | Mod: CPTII,S$GLB,, | Performed by: INTERNAL MEDICINE

## 2023-09-08 PROCEDURE — 1159F MED LIST DOCD IN RCRD: CPT | Mod: CPTII,S$GLB,, | Performed by: INTERNAL MEDICINE

## 2023-09-08 PROCEDURE — 4010F ACE/ARB THERAPY RXD/TAKEN: CPT | Mod: CPTII,S$GLB,, | Performed by: INTERNAL MEDICINE

## 2023-09-08 PROCEDURE — 3008F PR BODY MASS INDEX (BMI) DOCUMENTED: ICD-10-PCS | Mod: CPTII,S$GLB,, | Performed by: INTERNAL MEDICINE

## 2023-09-08 PROCEDURE — 3075F PR MOST RECENT SYSTOLIC BLOOD PRESS GE 130-139MM HG: ICD-10-PCS | Mod: CPTII,S$GLB,, | Performed by: INTERNAL MEDICINE

## 2023-09-08 PROCEDURE — 4010F PR ACE/ARB THEARPY RXD/TAKEN: ICD-10-PCS | Mod: CPTII,S$GLB,, | Performed by: INTERNAL MEDICINE

## 2023-09-08 PROCEDURE — 81003 URINALYSIS AUTO W/O SCOPE: CPT | Performed by: INTERNAL MEDICINE

## 2023-09-08 PROCEDURE — 3060F POS MICROALBUMINURIA REV: CPT | Mod: CPTII,S$GLB,, | Performed by: INTERNAL MEDICINE

## 2023-09-08 PROCEDURE — 1159F PR MEDICATION LIST DOCUMENTED IN MEDICAL RECORD: ICD-10-PCS | Mod: CPTII,S$GLB,, | Performed by: INTERNAL MEDICINE

## 2023-09-08 PROCEDURE — 99214 OFFICE O/P EST MOD 30 MIN: CPT | Mod: S$GLB,,, | Performed by: INTERNAL MEDICINE

## 2023-09-08 PROCEDURE — 3066F NEPHROPATHY DOC TX: CPT | Mod: CPTII,S$GLB,, | Performed by: INTERNAL MEDICINE

## 2023-09-08 PROCEDURE — 3075F SYST BP GE 130 - 139MM HG: CPT | Mod: CPTII,S$GLB,, | Performed by: INTERNAL MEDICINE

## 2023-09-08 PROCEDURE — 99214 PR OFFICE/OUTPT VISIT, EST, LEVL IV, 30-39 MIN: ICD-10-PCS | Mod: S$GLB,,, | Performed by: INTERNAL MEDICINE

## 2023-09-08 RX ORDER — HYDROCHLOROTHIAZIDE 25 MG/1
25 TABLET ORAL DAILY
Qty: 90 TABLET | Refills: 0 | Status: SHIPPED | OUTPATIENT
Start: 2023-09-08 | End: 2023-12-08

## 2023-09-08 NOTE — ASSESSMENT & PLAN NOTE
Normal CT urogram from earlier this year (had some tiny punctate lesions that could have represented asymptomatic nephrolithiasis)  Recheck UA now

## 2023-09-08 NOTE — PATIENT INSTRUCTIONS
A Healthy Diet  A healthy diet is one that focuses on consumption of fruits, vegetables, fiber, and monounsaturated fats while minimizing intake of saturated and trans fats, simple carbohydrates, and red meats.   Examples of heart-healthy diets include the DASH (Dietary Approaches to Stop Hypertension) and Mediterranean diets.         How do monounsaturated fats affect my health?  Monounsaturated fats can help reduce bad cholesterol levels in your blood which can lower your risk of heart disease and stroke. They also provide nutrients to help develop and maintain your body's cells. Oils rich in monounsaturated fats also contribute vitamin E to the diet, an antioxidant vitamin most Americans need more of.  Are monounsaturated fats better for me than saturated fats or trans fats?  Yes. While all fats provide 9 calories per gram, monounsaturated fats and polyunsaturated fats can have a positive effect on your health, when eaten in moderation. The bad fats -saturated fats and trans fats - can negatively affect your health.  Which foods contain monounsaturated fats?  Most foods contain a combination of different fats.  Examples of foods high in monounsaturated fats include plant-based liquid oils such as:    olive oil,    canola oil,    peanut oil,    safflower oil and    sesame oil.   Other sources include avocados, peanut butter, and many nuts and seeds.  The American Heart Association and American College of Cardiology (AHA/ACC) additionally recommend reducing the percentage of calories from saturated fat (<7% of calories from saturated fat; <6% of calories from saturated fat in patients at high cardiovascular risk) and the percentage of calories from trans fats. Replacing saturated fats with polyunsaturated fats has been shown to reduce your risk of dying from heart disease.

## 2023-09-25 ENCOUNTER — TELEPHONE (OUTPATIENT)
Dept: DERMATOLOGY | Facility: CLINIC | Age: 48
End: 2023-09-25
Payer: COMMERCIAL

## 2023-09-25 NOTE — TELEPHONE ENCOUNTER
Spoke with pt. Informed pt that Dr. Dahl is booked until January 2024. Offered to get pt sched with another provider for a sooner appt at [facility]. Pt accepted appt. Advised pt until we are able to get her sched with nabila Mcmillan, this would be the soonest avail appt. Further advised that Dr. Dahl will be able to see chart notes from prev provider. Appt sched. Pt confirmed.     ----- Message from Susanne Lacy LPN sent at 9/22/2023  4:39 PM CDT -----    ----- Message -----  From: Varsha Rivas MA  Sent: 9/22/2023   1:45 PM CDT  To: Nabila JOHNSON Staff    Good afternoon,    Tera Mendez's( [facility]) wife Nan I had to cancel her appt with Dr. Dahl on 8/23 due to a meet the teacher day at her son's school. She reached out to me b/c when she asked the lady to r/s it over the phone she was told she could not do that and she would get a message to the staff for a nurse to call her back. She has not heard back from a nurse or MA. If you all can please reach out to Nan to discuss and schedule since she said she has a spot on the side of her face that needs to be looked at. You can call her at [phone].    Thank you   Varsha  Supervisor [facility] [employer] [facility]   [phone]

## 2023-09-29 ENCOUNTER — OFFICE VISIT (OUTPATIENT)
Dept: DERMATOLOGY | Facility: CLINIC | Age: 48
End: 2023-09-29
Payer: COMMERCIAL

## 2023-09-29 DIAGNOSIS — D48.5 NEOPLASM OF UNCERTAIN BEHAVIOR OF SKIN: Primary | ICD-10-CM

## 2023-09-29 DIAGNOSIS — Z12.83 SCREENING EXAM FOR SKIN CANCER: ICD-10-CM

## 2023-09-29 DIAGNOSIS — D22.9 MULTIPLE BENIGN NEVI: ICD-10-CM

## 2023-09-29 DIAGNOSIS — L81.4 LENTIGINES: ICD-10-CM

## 2023-09-29 DIAGNOSIS — L57.3 POIKILODERMA OF CIVATTE: ICD-10-CM

## 2023-09-29 DIAGNOSIS — L82.1 SEBORRHEIC KERATOSES: ICD-10-CM

## 2023-09-29 DIAGNOSIS — D18.01 CHERRY ANGIOMA: ICD-10-CM

## 2023-09-29 DIAGNOSIS — L72.0 EIC (EPIDERMAL INCLUSION CYST): ICD-10-CM

## 2023-09-29 PROCEDURE — 99203 PR OFFICE/OUTPT VISIT, NEW, LEVL III, 30-44 MIN: ICD-10-PCS | Mod: 25,S$GLB,, | Performed by: STUDENT IN AN ORGANIZED HEALTH CARE EDUCATION/TRAINING PROGRAM

## 2023-09-29 PROCEDURE — 99999 PR PBB SHADOW E&M-EST. PATIENT-LVL III: CPT | Mod: PBBFAC,,, | Performed by: STUDENT IN AN ORGANIZED HEALTH CARE EDUCATION/TRAINING PROGRAM

## 2023-09-29 PROCEDURE — 3066F PR DOCUMENTATION OF TREATMENT FOR NEPHROPATHY: ICD-10-PCS | Mod: CPTII,S$GLB,, | Performed by: STUDENT IN AN ORGANIZED HEALTH CARE EDUCATION/TRAINING PROGRAM

## 2023-09-29 PROCEDURE — 4010F PR ACE/ARB THEARPY RXD/TAKEN: ICD-10-PCS | Mod: CPTII,S$GLB,, | Performed by: STUDENT IN AN ORGANIZED HEALTH CARE EDUCATION/TRAINING PROGRAM

## 2023-09-29 PROCEDURE — 11102 PR TANGENTIAL BIOPSY, SKIN, SINGLE LESION: ICD-10-PCS | Mod: S$GLB,,, | Performed by: STUDENT IN AN ORGANIZED HEALTH CARE EDUCATION/TRAINING PROGRAM

## 2023-09-29 PROCEDURE — 88305 TISSUE EXAM BY PATHOLOGIST: CPT | Mod: 26,,, | Performed by: PATHOLOGY

## 2023-09-29 PROCEDURE — 1159F PR MEDICATION LIST DOCUMENTED IN MEDICAL RECORD: ICD-10-PCS | Mod: CPTII,S$GLB,, | Performed by: STUDENT IN AN ORGANIZED HEALTH CARE EDUCATION/TRAINING PROGRAM

## 2023-09-29 PROCEDURE — 88313 SPECIAL STAINS GROUP 2: CPT | Performed by: PATHOLOGY

## 2023-09-29 PROCEDURE — 3060F POS MICROALBUMINURIA REV: CPT | Mod: CPTII,S$GLB,, | Performed by: STUDENT IN AN ORGANIZED HEALTH CARE EDUCATION/TRAINING PROGRAM

## 2023-09-29 PROCEDURE — 1160F PR REVIEW ALL MEDS BY PRESCRIBER/CLIN PHARMACIST DOCUMENTED: ICD-10-PCS | Mod: CPTII,S$GLB,, | Performed by: STUDENT IN AN ORGANIZED HEALTH CARE EDUCATION/TRAINING PROGRAM

## 2023-09-29 PROCEDURE — 99203 OFFICE O/P NEW LOW 30 MIN: CPT | Mod: 25,S$GLB,, | Performed by: STUDENT IN AN ORGANIZED HEALTH CARE EDUCATION/TRAINING PROGRAM

## 2023-09-29 PROCEDURE — 1160F RVW MEDS BY RX/DR IN RCRD: CPT | Mod: CPTII,S$GLB,, | Performed by: STUDENT IN AN ORGANIZED HEALTH CARE EDUCATION/TRAINING PROGRAM

## 2023-09-29 PROCEDURE — 88342 IMHCHEM/IMCYTCHM 1ST ANTB: CPT | Performed by: PATHOLOGY

## 2023-09-29 PROCEDURE — 4010F ACE/ARB THERAPY RXD/TAKEN: CPT | Mod: CPTII,S$GLB,, | Performed by: STUDENT IN AN ORGANIZED HEALTH CARE EDUCATION/TRAINING PROGRAM

## 2023-09-29 PROCEDURE — 1159F MED LIST DOCD IN RCRD: CPT | Mod: CPTII,S$GLB,, | Performed by: STUDENT IN AN ORGANIZED HEALTH CARE EDUCATION/TRAINING PROGRAM

## 2023-09-29 PROCEDURE — 88313 PR  SPECIAL STAINS,GROUP II: ICD-10-PCS | Mod: 26,,, | Performed by: PATHOLOGY

## 2023-09-29 PROCEDURE — 88313 SPECIAL STAINS GROUP 2: CPT | Mod: 26,,, | Performed by: PATHOLOGY

## 2023-09-29 PROCEDURE — 3060F PR POS MICROALBUMINURIA RESULT DOCUMENTED/REVIEW: ICD-10-PCS | Mod: CPTII,S$GLB,, | Performed by: STUDENT IN AN ORGANIZED HEALTH CARE EDUCATION/TRAINING PROGRAM

## 2023-09-29 PROCEDURE — 88305 TISSUE EXAM BY PATHOLOGIST: CPT | Performed by: PATHOLOGY

## 2023-09-29 PROCEDURE — 11102 TANGNTL BX SKIN SINGLE LES: CPT | Mod: S$GLB,,, | Performed by: STUDENT IN AN ORGANIZED HEALTH CARE EDUCATION/TRAINING PROGRAM

## 2023-09-29 PROCEDURE — 3066F NEPHROPATHY DOC TX: CPT | Mod: CPTII,S$GLB,, | Performed by: STUDENT IN AN ORGANIZED HEALTH CARE EDUCATION/TRAINING PROGRAM

## 2023-09-29 PROCEDURE — 88342 IMHCHEM/IMCYTCHM 1ST ANTB: CPT | Mod: 26,,, | Performed by: PATHOLOGY

## 2023-09-29 PROCEDURE — 99999 PR PBB SHADOW E&M-EST. PATIENT-LVL III: ICD-10-PCS | Mod: PBBFAC,,, | Performed by: STUDENT IN AN ORGANIZED HEALTH CARE EDUCATION/TRAINING PROGRAM

## 2023-09-29 PROCEDURE — 88305 TISSUE EXAM BY PATHOLOGIST: ICD-10-PCS | Mod: 26,,, | Performed by: PATHOLOGY

## 2023-09-29 PROCEDURE — 88342 CHG IMMUNOCYTOCHEMISTRY: ICD-10-PCS | Mod: 26,,, | Performed by: PATHOLOGY

## 2023-09-29 NOTE — PROGRESS NOTES
Subjective:      Patient ID:  Nan Mendez is a 48 y.o. female who presents for   Chief Complaint   Patient presents with    Skin Check     TBSE     Nan Mendez is a 48 y.o. female who presents for: FBSE screening exam for skin cancer.    New patient    The patient has the following lesions of concern:  Location: spot on R cheek  Duration: 6 mos  Symptoms: was not there before  Relieving factors/Previous treatments: n/a    Patient with new complaint of lesion(s)  Location: raised area under R eye  Duration: 1 yr  Symptoms: getting bigger  Relieving factors/Previous treatments: n/a       Pertinent history:  History of blistering sunburns: No  History of tanning bed use: No  Immunosuppressing medications or conditions: No  Family history of melanoma: No  Personal history of mole removal: Yes  Personal history of skin cancer: No         Review of Systems   Skin:  Positive for activity-related sunscreen use. Negative for daily sunscreen use and recent sunburn.   Hematologic/Lymphatic: Does not bruise/bleed easily.       Objective:   Physical Exam   Constitutional: She appears well-developed and well-nourished. No distress.   Neurological: She is alert and oriented to person, place, and time. She is not disoriented.   Psychiatric: She has a normal mood and affect.   Skin:   Areas Examined (abnormalities noted in diagram):   Scalp / Hair Palpated and Inspected  Head / Face Inspection Performed  Neck Inspection Performed  Chest / Axilla Inspection Performed  Abdomen Inspection Performed  Genitals / Buttocks / Groin Inspection Performed  Back Inspection Performed  RUE Inspected  LUE Inspection Performed  RLE Inspected  LLE Inspection Performed  Nails and Digits Inspection Performed                 Diagram Legend     Erythematous scaling macule/papule c/w actinic keratosis       Vascular papule c/w angioma      Pigmented verrucoid papule/plaque c/w seborrheic keratosis      Yellow umbilicated papule c/w  sebaceous hyperplasia      Irregularly shaped tan macule c/w lentigo     1-2 mm smooth white papules consistent with Milia      Movable subcutaneous cyst with punctum c/w epidermal inclusion cyst      Subcutaneous movable cyst c/w pilar cyst      Firm pink to brown papule c/w dermatofibroma      Pedunculated fleshy papule(s) c/w skin tag(s)      Evenly pigmented macule c/w junctional nevus     Mildly variegated pigmented, slightly irregular-bordered macule c/w mildly atypical nevus      Flesh colored to evenly pigmented papule c/w intradermal nevus       Pink pearly papule/plaque c/w basal cell carcinoma      Erythematous hyperkeratotic cursted plaque c/w SCC      Surgical scar with no sign of skin cancer recurrence      Open and closed comedones      Inflammatory papules and pustules      Verrucoid papule consistent consistent with wart     Erythematous eczematous patches and plaques     Dystrophic onycholytic nail with subungual debris c/w onychomycosis     Umbilicated papule    Erythematous-base heme-crusted tan verrucoid plaque consistent with inflamed seborrheic keratosis     Erythematous Silvery Scaling Plaque c/w Psoriasis     See annotation        Assessment / Plan:      Pathology Orders:       Normal Orders This Visit    Specimen to Pathology, Dermatology     Questions:    Procedure Type: Dermatology and skin neoplasms    Number of Specimens: 1    ------------------------: -------------------------    Spec 1 Procedure: Biopsy    Spec 1 Clinical Impression: telangiectatic patch- 1 cm bcc v telangiectasias    Spec 1 Source: right malar cheek    Release to patient: Immediate          Neoplasm of uncertain behavior of skin  -     Specimen to Pathology, Dermatology  Shave biopsy procedure note:    Shave biopsy performed after verbal consent including risk of infection, scar, recurrence, need for additional treatment of site. Area prepped with alcohol, anesthetized with approximately 1.0cc of 1% lidocaine with  "epinephrine. Lesional tissue shaved with razor blade. Hemostasis achieved with application of aluminum chloride followed by hyfrecation. No complications. Dressing applied. Wound care explained.    Multiple benign nevi  Lentigines  Cherry angioma  Seborrheic keratoses  Reassurance given to patient. No treatment is necessary.   Treatment of benign, asymptomatic lesions may be considered cosmetic.    Poikiloderma of [place]  - Recommend sunscreen SPF 30+ mineral, discussed this is from long term sun exposure  - Interested in cosmetic treatments- recommended laser provided Dr. Lomeli handout    EIC (epidermal inclusion cyst)- right abdomen, 8 mm   Reassurance given to patient on benign nature of cyst. This is a "sac of skin cells" beneath the top layer of skin. If the cyst is drained, it will typically grow back because the sac will fill up again with the keratin debris. The only way to get rid of the cyst is to completely cut out the cyst sac. This is done with a skin excision procedure, involving local numbing and cutting into skin with scalpel, followed by suturing which would be removed in 2 weeks. Will need light activity for 2 weeks after surgery to prevent scar spreading/openining. Risks of skin excision include scar, infection, bleeding, and recurrence of cyst.    If not bothersome it is fine to observe cyst. Sometimes it may grow, or rupture and become inflamed (red, sometimes painful). Patient can contact office if cyst becomes bothersome.    Patient voiced understanding and would like schedule excision    Surgical risk factors: None    Screening exam for skin cancer  Total body skin examination performed today including at least 12 points as noted in physical examination. No lesions suspicious for malignancy noted.    Recommend daily sun protection/avoidance, use of at least SPF 30, broad spectrum sunscreen (OTC drug), skin self examinations, and routine physician surveillance to optimize early " detection    RTC 1 year, sooner pending biopsy results  Scheduled EC in November

## 2023-10-09 ENCOUNTER — PATIENT MESSAGE (OUTPATIENT)
Dept: DERMATOLOGY | Facility: CLINIC | Age: 48
End: 2023-10-09
Payer: COMMERCIAL

## 2023-10-10 RX ORDER — MUPIROCIN 20 MG/G
OINTMENT TOPICAL
Qty: 22 G | Refills: 1 | Status: SHIPPED | OUTPATIENT
Start: 2023-10-10

## 2023-10-12 LAB
FINAL PATHOLOGIC DIAGNOSIS: NORMAL
GROSS: NORMAL
Lab: NORMAL
MICROSCOPIC EXAM: NORMAL

## 2023-11-09 ENCOUNTER — PROCEDURE VISIT (OUTPATIENT)
Dept: DERMATOLOGY | Facility: CLINIC | Age: 48
End: 2023-11-09
Payer: COMMERCIAL

## 2023-11-09 DIAGNOSIS — L72.0 EPIDERMAL INCLUSION CYST: Primary | ICD-10-CM

## 2023-11-09 PROCEDURE — 11402 PR EXC SKIN BENIG 1.1-2 CM TRUNK,ARM,LEG: ICD-10-PCS | Mod: S$GLB,,, | Performed by: STUDENT IN AN ORGANIZED HEALTH CARE EDUCATION/TRAINING PROGRAM

## 2023-11-09 PROCEDURE — 88304 TISSUE EXAM BY PATHOLOGIST: CPT | Mod: 26,,, | Performed by: STUDENT IN AN ORGANIZED HEALTH CARE EDUCATION/TRAINING PROGRAM

## 2023-11-09 PROCEDURE — 11402 EXC TR-EXT B9+MARG 1.1-2 CM: CPT | Mod: S$GLB,,, | Performed by: STUDENT IN AN ORGANIZED HEALTH CARE EDUCATION/TRAINING PROGRAM

## 2023-11-09 PROCEDURE — 12031 INTMD RPR S/A/T/EXT 2.5 CM/<: CPT | Mod: 51,S$GLB,, | Performed by: STUDENT IN AN ORGANIZED HEALTH CARE EDUCATION/TRAINING PROGRAM

## 2023-11-09 PROCEDURE — 12031 PR LAYR CLOS WND TRUNK,ARM,LEG <2.5 CM: ICD-10-PCS | Mod: 51,S$GLB,, | Performed by: STUDENT IN AN ORGANIZED HEALTH CARE EDUCATION/TRAINING PROGRAM

## 2023-11-09 PROCEDURE — 99499 NO LOS: ICD-10-PCS | Mod: S$GLB,,, | Performed by: STUDENT IN AN ORGANIZED HEALTH CARE EDUCATION/TRAINING PROGRAM

## 2023-11-09 PROCEDURE — 88304 TISSUE EXAM BY PATHOLOGIST: CPT | Performed by: STUDENT IN AN ORGANIZED HEALTH CARE EDUCATION/TRAINING PROGRAM

## 2023-11-09 PROCEDURE — 88304 PR  SURG PATH,LEVEL III: ICD-10-PCS | Mod: 26,,, | Performed by: STUDENT IN AN ORGANIZED HEALTH CARE EDUCATION/TRAINING PROGRAM

## 2023-11-09 PROCEDURE — 99499 UNLISTED E&M SERVICE: CPT | Mod: S$GLB,,, | Performed by: STUDENT IN AN ORGANIZED HEALTH CARE EDUCATION/TRAINING PROGRAM

## 2023-11-09 RX ORDER — DOXYCYCLINE 100 MG/1
CAPSULE ORAL
Qty: 14 CAPSULE | Refills: 0 | Status: SHIPPED | OUTPATIENT
Start: 2023-11-09

## 2023-11-09 NOTE — PROGRESS NOTES
EIC EXCISION NOTE    PROCEDURE: Elliptical excision with intermediate layered repair in order to decrease dead space and close large gap.    ANESTHETIC: 12 cc 1% Xylocaine with Epinephrine 1:100,000, buffered    SURGEON: Bonnie Mujica M.D.    ASSISTANTS: Wendy Patel MA    PREOPERATIVE DIAGNOSIS:   EIC (ruptured)    POSTOPERATIVE DIAGNOSIS:  Same as preoperative diagnosis    PATHOLOGIC DIAGNOSIS: Pending    LOCATION: right upper abdomen    INITIAL LESION SIZE: 1.2 x 0.8 cm    EXCISED DIAMETER: 1.2 x 0.8 cm    PREPARATION: The diagnosis, procedure, alternatives, benefits and risks, including but not limited to: infection, bleeding/bruising, drug reactions, pain, scar or cosmetic defect, local sensation disturbances, wound dehiscence (separation of wound edges after sutures removed) and/or recurrence of present condition were explained to the patient. The patient elected to proceed.  Patient's identity was verified using 2 patient identifiers and the side and site was verified.  Time out period with surgeon, assistant and patient in surgical suite was taken.    PROCEDURE: The location noted above was prepped, draped, and anesthetized in the usual sterile fashion per  me . Lesional tissue was carefully marked with at least 0 mm margins of clinically normal skin in all directions. A fusiform elliptical excision was done with #15 blade carried down completely through the dermis into the deep subcutaneous tissues to the level of the non-muscle fascia, and dissection was carried out in that plane.  Electrocoagulation was used to obtain hemostasis. Blood loss was minimal. The wound was then approximated in a layered fashion with subcutaneous and intradermal sutures of 3.0 Monocryl, approximately 2 in number, and the wound was then superficially closed with simple interrupted sutures of 4.0 Prolene.    The patient tolerated the procedure well.    The area was cleaned and dressed appropriately and the patient was given  wound care instructions, as well as an appointment for follow-up evaluation.    LENGTH OF REPAIR: 1.5 cm    Cyst ruptured prior to excision. Start doxycycline 100 mg bid x 1 week take with food and water    2 weeks suture removal

## 2023-11-16 LAB
FINAL PATHOLOGIC DIAGNOSIS: NORMAL
Lab: NORMAL

## 2023-11-21 ENCOUNTER — CLINICAL SUPPORT (OUTPATIENT)
Dept: DERMATOLOGY | Facility: CLINIC | Age: 48
End: 2023-11-21
Payer: COMMERCIAL

## 2023-11-21 DIAGNOSIS — Z48.02 VISIT FOR SUTURE REMOVAL: Primary | ICD-10-CM

## 2023-11-21 PROCEDURE — 99024 POSTOP FOLLOW-UP VISIT: CPT | Mod: S$GLB,,, | Performed by: STUDENT IN AN ORGANIZED HEALTH CARE EDUCATION/TRAINING PROGRAM

## 2023-11-21 PROCEDURE — 99999 PR PBB SHADOW E&M-EST. PATIENT-LVL II: ICD-10-PCS | Mod: PBBFAC,,,

## 2023-11-21 PROCEDURE — 99024 PR POST-OP FOLLOW-UP VISIT: ICD-10-PCS | Mod: S$GLB,,, | Performed by: STUDENT IN AN ORGANIZED HEALTH CARE EDUCATION/TRAINING PROGRAM

## 2023-11-21 PROCEDURE — 99999 PR PBB SHADOW E&M-EST. PATIENT-LVL II: CPT | Mod: PBBFAC,,,

## 2023-11-21 NOTE — PROGRESS NOTES
Subjective:      Patient ID:  Nan Mendez is a 48 y.o. female who presents for   Chief Complaint   Patient presents with    Suture / Staple Removal     abdomen     CC: 48 y.o.female patient is here for suture removal.     HPI: Patient is s/p excision of cyst on 11/09/23.  Patient reports no problems.    WOUND PE:  Sutures intact.  Wound healing well.  Good approximation of skin edges.  No signs or symptoms of infection.    IMPRESSION:  SKIN, RIGHT MID ABDOMEN, EXCISION:   - Follicular infundibular cyst (epidermal inclusion cyst).         PLAN:  Sutures removed today.  Continue wound care.    RTC: In 1 year for skin check or sooner should any new concerns arise       Review of Systems    Objective:   Physical Exam     Diagram Legend     Erythematous scaling macule/papule c/w actinic keratosis       Vascular papule c/w angioma      Pigmented verrucoid papule/plaque c/w seborrheic keratosis      Yellow umbilicated papule c/w sebaceous hyperplasia      Irregularly shaped tan macule c/w lentigo     1-2 mm smooth white papules consistent with Milia      Movable subcutaneous cyst with punctum c/w epidermal inclusion cyst      Subcutaneous movable cyst c/w pilar cyst      Firm pink to brown papule c/w dermatofibroma      Pedunculated fleshy papule(s) c/w skin tag(s)      Evenly pigmented macule c/w junctional nevus     Mildly variegated pigmented, slightly irregular-bordered macule c/w mildly atypical nevus      Flesh colored to evenly pigmented papule c/w intradermal nevus       Pink pearly papule/plaque c/w basal cell carcinoma      Erythematous hyperkeratotic cursted plaque c/w SCC      Surgical scar with no sign of skin cancer recurrence      Open and closed comedones      Inflammatory papules and pustules      Verrucoid papule consistent consistent with wart     Erythematous eczematous patches and plaques     Dystrophic onycholytic nail with subungual debris c/w onychomycosis     Umbilicated papule     Erythematous-base heme-crusted tan verrucoid plaque consistent with inflamed seborrheic keratosis     Erythematous Silvery Scaling Plaque c/w Psoriasis     See annotation      Assessment / Plan:        There are no diagnoses linked to this encounter.         No follow-ups on file.

## 2023-12-08 DIAGNOSIS — I10 PRIMARY HYPERTENSION: ICD-10-CM

## 2023-12-08 RX ORDER — HYDROCHLOROTHIAZIDE 25 MG/1
25 TABLET ORAL
Qty: 90 TABLET | Refills: 0 | Status: SHIPPED | OUTPATIENT
Start: 2023-12-08 | End: 2024-03-06

## 2023-12-10 DIAGNOSIS — I10 PRIMARY HYPERTENSION: Primary | ICD-10-CM

## 2023-12-10 RX ORDER — AMLODIPINE AND VALSARTAN 5; 160 MG/1; MG/1
1 TABLET ORAL DAILY
Qty: 90 TABLET | Refills: 3 | Status: SHIPPED | OUTPATIENT
Start: 2023-12-10 | End: 2024-03-06

## 2024-01-04 NOTE — ED ADULT TRIAGE NOTE - WILL THE PATIENT ACCEPT THE PFIZER COVID-19 VACCINE IF ELIGIBLE AND IT IS AVAILABLE?
Nursing notes reviewed and accepted.      Sean Chin is a 3 year old female who presents for 3-year-old well-child exam.      Concerns raised today include: none    Child accompanied by father.    DIET:      Appetite: Good      Milk: 2 Percent 2 cups/day      Water: city  Elimination patterns: normal  Sleep: no issues  School: no issues  Social interactions: no issues    DEVELOPMENT AS REVIEWED WITH PARENT:  Run, skip and hop well?   YES  Talk clearly using long sentences?  YES  Tell stories and recite nursery rhymes?  YES  Know full name, address and phone number?  YES  Sing songs and play make believe?  YES  Draw a stick person?  YES  Dress or undress without help?  YES  Developmental concerns: no issues    Social History:no issues    Birth history, medical history, surgical history, and family history reviewed and updated.    PHYSICAL EXAM:  Pulse 104, temperature 98.6 °F (37 °C), temperature source Temporal, height 3' 0.5\" (0.927 m), weight 12.9 kg (28 lb 6.4 oz).  GENERAL: Well-appearing female, nontoxic, no acute distress. Alert and interactive.  SKIN: Warm, normal turgor. No cyanosis. No bruises or lesions.  HEAD: Normocephalic, atraumatic.    EYES: Conjunctivae without injection or icterus. Pupils equal, round, and reactive to light. Extraocular movements are intact.  NOSE: No flaring.  EARS: Tympanic membranes transparent with good landmarks.  THROAT: Oropharynx with moist mucous membranes and no lesions.  NECK: Supple, no lymphadenopathy or masses.  HEART: Regular rate and rhythm. Quiet precordium. Normal S1, S2. No murmurs, rubs, or gallops.   LUNGS: Clear to auscultation bilaterally.  No wheezes, rales, or rhonchi. Normal work of breathing.  ABDOMEN: Soft, nontender. No organomegaly or masses.  GENITOURINARY: Ebenezer 1 female.  EXTREMITIES: Warm, dry, without abnormalities.  NEUROLOGICAL: Normal tone, bulk, and strength.    ASSESSMENT:  3 year old female well child.      PLAN:  Well child  All  parental concerns and questions discussed.  Anticipatory guidance provided, handout given.              Safety/car/bicycle/fire/sharp objects/falls/water              Development              Discipline              Diet              Television              Analgesics/Antipyretics              Sun exposure/Insect repellent              Tobacco-free home              Dental care              Lead exposure risk: none                Immunizations per orders. Risks, benefits, and side effects discussed. The parents were counseled about each component of the vaccines, including possible side effects. Return to clinic in 1 year for well-child exam or sooner prn illness/concerns.           No

## 2024-03-06 ENCOUNTER — PATIENT MESSAGE (OUTPATIENT)
Dept: PRIMARY CARE CLINIC | Facility: CLINIC | Age: 49
End: 2024-03-06
Payer: COMMERCIAL

## 2024-03-06 DIAGNOSIS — I10 PRIMARY HYPERTENSION: Primary | ICD-10-CM

## 2024-03-06 RX ORDER — AMLODIPINE, VALSARTAN AND HYDROCHLOROTHIAZIDE 5; 160; 25 MG/1; MG/1; MG/1
TABLET ORAL
Qty: 90 TABLET | Refills: 3 | Status: SHIPPED | OUTPATIENT
Start: 2024-03-06

## 2024-03-06 NOTE — ASSESSMENT & PLAN NOTE
BMP  Lab Results   Component Value Date     09/08/2023    K 3.9 09/08/2023     09/08/2023    CO2 28 09/08/2023    BUN 12 09/08/2023    CREATININE 0.7 09/08/2023    CALCIUM 9.3 09/08/2023    ANIONGAP 10 09/08/2023    EGFRNORACEVR >60.0 09/08/2023

## 2024-03-08 ENCOUNTER — TELEPHONE (OUTPATIENT)
Dept: PRIMARY CARE CLINIC | Facility: CLINIC | Age: 49
End: 2024-03-08
Payer: COMMERCIAL

## 2024-03-08 NOTE — TELEPHONE ENCOUNTER
----- Message from Judy Vicky sent at 3/8/2024  8:12 AM CST -----  Contact: [phone] @ Patient  Caller is requesting an earlier appointment then we can schedule.  Caller is requesting a message be sent to the provider.  If this is for urgent care symptoms, did you offer other providers at this location, providers at other locations, or [facility]? (yes, no, n/a):  na  If this is for the patients physical, did you offer to schedule next available and put on wait list, or to see NP or PA for their physical?  (yes, no, n/a):  na  When is the next available appointment with their provider:  no appointments  Reason for the appointment:  annual visit  Patient preference of timeframe to be scheduled:  any day  Would the patient like a call back, or a response through their [facility] portal?:     call or portal message  Comments:

## 2024-03-27 NOTE — ED PROVIDER NOTE - NSFOLLOWUPINSTRUCTIONS_ED_ALL_ED_FT
Specialty Care (immediate)...
ACUTE HEADACHE - General Information           Acute Headache    WHAT YOU NEED TO KNOW:    What is an acute headache? An acute headache is pain or discomfort that starts suddenly and gets worse quickly. You may have an acute headache only when you feel stress or eat certain foods. Other acute headache pain can happen every day, and sometimes several times a day.     What are the most common types of acute headache?   •Tension headache is the most common type of headache. These headaches typically occur in the late afternoon and go away by evening. The pain is usually mild or moderate. You may have problems tolerating bright light or loud noise. The pain is usually across the forehead or in the back of the head, often only on one side. These headaches may occur every day.       •Migraine headaches cause moderate or severe pain. The headache generally lasts from 1 to 3 days and tends to come back. Pain is usually on only one side, but it may change sides. Migraines often occur in the temple, the back of the head, or behind the eye. The pain may throb or be sharp and steady.      •A migraine with aura means you see or feel something before a migraine. You may see a small spot surrounded by bright zigzag lines. Other signs or symptoms may follow the aura.       •Cluster headache pain is usually only on one side. It often causes severe pain, and can last for 30 minutes to 2 hours. These headaches may occur 1 or 2 times each day, more often at night. The pain may wake you.       What causes acute headaches? The cause of your headache may not be known. The following conditions can trigger a headache:   •Stress or tension, hours or even days after stressful events      •Fatigue, a lack of sleep or changes in your usual sleep pattern, or a nap during the day      •Menstruation, especially after pregnancy, or use of birth control pills or hormone replacement therapy      •Food such as cured meats, artificial sweeteners, alcohol, dark chocolate, and MSG       •Suddenly not having caffeine if you usually have larger amounts      •A medical problem, such as an infection, tooth pain, neck or sinus pain, thyroid problems, or a tumor      •A head injury      How is the type of acute headache diagnosed and treated? Your healthcare provider will ask you to describe your pain and rate it on a scale from 1 to 10. Tell the provider how often you have headaches and how long they last. Also describe any other symptoms you have along with headaches, such as dizziness or blurred vision.   •Medicines may be given to manage or prevent headaches. The medicine will depend on the type of acute headache you have. Do not wait until the pain is severe before you take your medicine. You may be able to take over-the-counter pain medicines as needed. Examples include NSAIDs and acetaminophen. Ask your healthcare provider which medicine is right for you. Ask how much to take and when to take it. Follow directions. These medicines can cause stomach bleeding or kidney or liver damage if not taken correctly.      •Biofeedback may be used to help you manage stress. Electrodes (wires) are placed on your body and attached to a monitor. You will learn how to change stress reactions. For example, you learn to slow your heart rate when you become upset.       •Cognitive behavior therapy, or stress management, may be used with other therapies to prevent headaches.      What can I do to manage my symptoms?   •Apply heat or ice on the headache area. Use a heat or ice pack. For an ice pack, you can also put crushed ice in a plastic bag. Cover the pack or bag with a towel before you apply it to your skin. Ice and heat both help decrease pain, and heat also helps decrease muscle spasms. Apply heat for 20 to 30 minutes every 2 hours. Apply ice for 15 to 20 minutes every hour. Apply heat or ice for as long and for as many days as directed. You may alternate heat and ice.      •Relax your muscles. Lie down in a comfortable position and close your eyes. Relax your muscles slowly. Start at your toes and work your way up your body.      •Keep a record of your headaches. Write down when your headaches start and stop. Include your symptoms and what you were doing when the headache began. Record what you ate or drank for 24 hours before the headache started. Describe the pain and where it hurts. Keep track of what you did to treat your headache and if it worked.       What can I do to prevent an acute headache?   •Avoid anything that triggers an acute headache. Examples include exposure to chemicals, going to high altitude, or not getting enough sleep. Create a regular sleep routine. Go to sleep at the same time and wake up at the same time each day. Do not use electronic devices before bedtime. These may trigger a headache or prevent you from sleeping well.      •Do not smoke. Nicotine and other chemicals in cigarettes and cigars can trigger an acute headache or make it worse. Ask your healthcare provider for information if you currently smoke and need help to quit. E-cigarettes or smokeless tobacco still contain nicotine. Talk to your healthcare provider before you use these products.       •Limit alcohol as directed. Alcohol can trigger an acute headache or make it worse. If you have cluster headaches, do not drink alcohol during an episode. For other types of headaches, ask your healthcare provider if it is safe for you to drink alcohol. Ask how much is safe for you to drink, and how often.      •Exercise as directed. Exercise can reduce tension and help with headache pain. Aim for 30 minutes of physical activity on most days of the week. Your healthcare provider can help you create an exercise plan.      •Eat a variety of healthy foods. Healthy foods include fruits, vegetables, low-fat dairy products, lean meats, fish, whole grains, and cooked beans. Your healthcare provider or dietitian can help you create meals plans if you need to avoid foods that trigger headaches.      When should I seek immediate care?   •You have severe pain.      •You have numbness or weakness on one side of your face or body.      •You have a headache that occurs after a blow to the head, a fall, or other trauma.       •You have a headache, are forgetful or confused, or have trouble speaking.      •You have a headache, stiff neck, and a fever.      When should I contact my healthcare provider?   •You have a constant headache and are vomiting.      •You have a headache each day that does not get better, even after treatment.      •You have changes in your headaches, or new symptoms that occur when you have a headache.      •You have questions or concerns about your condition or care.      CARE AGREEMENT:    You have the right to help plan your care. Learn about your health condition and how it may be treated. Discuss treatment options with your healthcare providers to decide what care you want to receive. You always have the right to refuse treatment.       IDIOPATHIC INTRACRANIAL HYPERTENSION - AfterCare(R) Instructions(ER/ED)           Idiopathic Intracranial Hypertension    WHAT YOU NEED TO KNOW:    IIH is a condition that causes the pressure inside your skull to be higher than normal for no known reason. IIH can seem like a brain tumor, but no tumor is found. IIH is most common in obese women who are of childbearing age.     DISCHARGE INSTRUCTIONS:    Call 911 for any of the following:   •You suddenly cannot see.      •You have sudden neck pain or cannot move your arms or legs.      •You have sudden trouble breathing.      •You are confused or cannot think clearly.      Return to the emergency department if:   •You have a severe headache.      •You have a seizure.      Contact your healthcare provider or specialist if:   •You have a fever.      •Your headache gets worse or does not go away with treatment.      •Your vision loss does not improve with treatment.      •You have questions or concerns about your condition or care.      Medicines: You may need any of the following:   •Migraine medicine may help decrease how much CSF you produce. This will help relieve pressure in your skull.      •NSAIDs, such as ibuprofen, help decrease swelling, pain, and fever. This medicine is available with or without a doctor's order. NSAIDs can cause stomach bleeding or kidney problems in certain people. If you take blood thinner medicine, always ask if NSAIDs are safe for you. Always read the medicine label and follow directions. Do not give these medicines to children under 6 months of age without direction from your child's healthcare provider.      •Acetaminophen decreases pain and fever. It is available without a doctor's order. Ask how much to take and how often to take it. Follow directions. Read the labels of all other medicines you are using to see if they also contain acetaminophen, or ask your doctor or pharmacist. Acetaminophen can cause liver damage if not taken correctly. Do not use more than 4 grams (4,000 milligrams) total of acetaminophen in one day.       •Diuretics help decrease extra fluid that collects in your body. This will help lower the pressure in your skull. Diuretics are often called water pills. You may urinate more often when you take this medicine.      •Prescription pain medicine may be given. Ask your healthcare provider how to take this medicine safely. Some prescription pain medicines contain acetaminophen. Do not take other medicines that contain acetaminophen without talking to your healthcare provider. Too much acetaminophen may cause liver damage. Prescription pain medicine may cause constipation. Ask your healthcare provider how to prevent or treat constipation.       •Take your medicine as directed. Contact your healthcare provider if you think your medicine is not helping or if you have side effects. Tell him or her if you are allergic to any medicine. Keep a list of the medicines, vitamins, and herbs you take. Include the amounts, and when and why you take them. Bring the list or the pill bottles to follow-up visits. Carry your medicine list with you in case of an emergency.      Manage IIH:   •Maintain a healthy weight. Ask your healthcare provider how much you should weigh. Ask your provider to help you create a weight loss plan if you are overweight.       •Eat a variety of healthy foods. You may need to limit the amount of fats and salt you eat. You may also need to limit foods rich in vitamin A and tyramine. Foods rich in vitamin A include beef liver, sweet potatoes, carrots, tomatoes, and leafy greens. Food and drinks that are high in tyramine include cheese, pepperoni, salami, beer, and wine. Ask if you need to be on a special diet.      •Drink liquids as directed. Ask your healthcare provider how much liquid to drink each day and which liquids are best for you.      Follow up with your healthcare provider or specialist as directed: You may need to return for eye exams every 10 to 14 days. Write down your questions so you remember to ask them during your visits.

## 2024-04-03 NOTE — ASSESSMENT & PLAN NOTE
BMP  Lab Results   Component Value Date     09/08/2023    K 3.9 09/08/2023     09/08/2023    CO2 28 09/08/2023    BUN 12 09/08/2023    CREATININE 0.7 09/08/2023    CALCIUM 9.3 09/08/2023    ANIONGAP 10 09/08/2023    EGFRNORACEVR >60.0 09/08/2023     **recheck BMP

## 2024-04-05 ENCOUNTER — HOSPITAL ENCOUNTER (OUTPATIENT)
Dept: RADIOLOGY | Facility: HOSPITAL | Age: 49
Discharge: HOME | End: 2024-04-05
Attending: INTERNAL MEDICINE
Payer: COMMERCIAL

## 2024-04-05 ENCOUNTER — OFFICE VISIT (OUTPATIENT)
Dept: PRIMARY CARE CLINIC | Facility: CLINIC | Age: 49
End: 2024-04-05
Attending: INTERNAL MEDICINE
Payer: COMMERCIAL

## 2024-04-05 VITALS
DIASTOLIC BLOOD PRESSURE: 68 MMHG | OXYGEN SATURATION: 99 % | HEART RATE: 73 BPM | RESPIRATION RATE: 18 BRPM | SYSTOLIC BLOOD PRESSURE: 118 MMHG | HEIGHT: 63 IN | BODY MASS INDEX: 24.34 KG/M2 | WEIGHT: 137.38 LBS

## 2024-04-05 DIAGNOSIS — M79.642 PAIN OF LEFT HAND: ICD-10-CM

## 2024-04-05 DIAGNOSIS — I10 PRIMARY HYPERTENSION: Primary | ICD-10-CM

## 2024-04-05 DIAGNOSIS — E03.9 ACQUIRED HYPOTHYROIDISM: ICD-10-CM

## 2024-04-05 DIAGNOSIS — R31.21 ASYMPTOMATIC MICROSCOPIC HEMATURIA: ICD-10-CM

## 2024-04-05 DIAGNOSIS — Z91.89 AT INCREASED RISK FOR CARDIOVASCULAR DISEASE: ICD-10-CM

## 2024-04-05 DIAGNOSIS — Z00.00 HEALTHCARE MAINTENANCE: ICD-10-CM

## 2024-04-05 LAB
BILIRUB UR QL STRIP: NEGATIVE
CLARITY UR REFRACT.AUTO: CLEAR
COLOR UR AUTO: YELLOW
GLUCOSE UR QL STRIP: NEGATIVE
HGB UR QL STRIP: NEGATIVE
KETONES UR QL STRIP: NEGATIVE
LEUKOCYTE ESTERASE UR QL STRIP: NEGATIVE
NITRITE UR QL STRIP: NEGATIVE
PH UR STRIP: 6 [PH] (ref 5–8)
PROT UR QL STRIP: NEGATIVE
SP GR UR STRIP: 1.01 (ref 1–1.03)
URN SPEC COLLECT METH UR: NORMAL

## 2024-04-05 PROCEDURE — 90715 TDAP VACCINE 7 YRS/> IM: CPT | Mod: S$GLB,,, | Performed by: INTERNAL MEDICINE

## 2024-04-05 PROCEDURE — 99499 UNLISTED E&M SERVICE: CPT | Mod: S$GLB,,, | Performed by: INTERNAL MEDICINE

## 2024-04-05 PROCEDURE — 90471 IMMUNIZATION ADMIN: CPT | Mod: S$GLB,,, | Performed by: INTERNAL MEDICINE

## 2024-04-05 PROCEDURE — 81003 URINALYSIS AUTO W/O SCOPE: CPT | Performed by: INTERNAL MEDICINE

## 2024-04-05 PROCEDURE — 75571 CT HRT W/O DYE W/CA TEST: CPT | Mod: 26,,, | Performed by: RADIOLOGY

## 2024-04-05 PROCEDURE — 73130 X-RAY EXAM OF HAND: CPT | Mod: 26,LT,, | Performed by: RADIOLOGY

## 2024-04-05 PROCEDURE — 73130 X-RAY EXAM OF HAND: CPT | Mod: TC,LT

## 2024-04-05 PROCEDURE — 75571 CT HRT W/O DYE W/CA TEST: CPT | Mod: TC

## 2024-04-05 RX ORDER — DICLOFENAC SODIUM 10 MG/G
2 GEL TOPICAL 4 TIMES DAILY
Qty: 100 G | Refills: 2 | Status: SHIPPED | OUTPATIENT
Start: 2024-04-05

## 2024-04-05 NOTE — PATIENT INSTRUCTIONS
PLEASE REMEMBER TO CALLL US FIRST with any medical questions or concerns. We try very hard to keep you out of the emergency room if we are able to see you and help with your concern. It is one of our many ways to keep our patients healthy.   For now the best way to reach us is by calling [facility] direct number:  CALL OUR OFFICE AT: [phone]    You can also schedule with us through the portal.   Dr. Junior Chan and MICHEAL Jasso are both here and available most days of the week if you need to be seen in person.  I am here every Friday but can also make myself available on other days if necessary as long as I have enough notice.  Please do not ever hesitate to reach to me via the patient portal.      Please let us know if you have any challenges getting scheduled with our behavioral therapist/ or dietitian or if you have any difficulty with any new medication(s) that were prescribed.     Dr. WALDROP

## 2024-04-05 NOTE — PROGRESS NOTES
After obtaining consent, and per orders of Dr. Jones, injection of Tdap given by FARHAD RAMIREZ. Patient instructed to remain in clinic for 15 minutes afterwards, and to report any adverse reaction to Staff immediately.

## 2024-04-08 ENCOUNTER — PATIENT MESSAGE (OUTPATIENT)
Dept: PRIMARY CARE CLINIC | Facility: CLINIC | Age: 49
End: 2024-04-08
Payer: COMMERCIAL

## 2024-04-08 DIAGNOSIS — I10 PRIMARY HYPERTENSION: Primary | ICD-10-CM

## 2024-04-08 DIAGNOSIS — Z12.31 ENCOUNTER FOR SCREENING MAMMOGRAM FOR MALIGNANT NEOPLASM OF BREAST: ICD-10-CM

## 2024-04-09 NOTE — ASSESSMENT & PLAN NOTE
BMP  Lab Results   Component Value Date     04/05/2024    K 3.4 (L) 04/05/2024     04/05/2024    CO2 28 04/05/2024    BUN 16 04/05/2024    CREATININE 0.8 04/05/2024    CALCIUM 9.7 04/05/2024    ANIONGAP 4 (L) 04/05/2024    EGFRNORACEVR >60.0 04/05/2024     --K a little low, probably due to HCTZ  --will check PRA with next set of labs to screen, but clinical suspicion is low for aldosterone excess state  --recheck K in 3 months  --advised her to increase dietary potassium intake (with list of high K dietary choices given to her)

## 2024-05-31 ENCOUNTER — OFFICE VISIT (OUTPATIENT)
Dept: OPHTHALMOLOGY | Facility: CLINIC | Age: 49
End: 2024-05-31
Payer: COMMERCIAL

## 2024-05-31 DIAGNOSIS — H43.823 VITREOMACULAR ADHESION OF BOTH EYES: Primary | ICD-10-CM

## 2024-05-31 PROCEDURE — 92004 COMPRE OPH EXAM NEW PT 1/>: CPT | Mod: S$GLB,,, | Performed by: OPHTHALMOLOGY

## 2024-05-31 PROCEDURE — 92202 OPSCPY EXTND ON/MAC DRAW: CPT | Mod: 59,S$GLB,, | Performed by: OPHTHALMOLOGY

## 2024-05-31 PROCEDURE — 1160F RVW MEDS BY RX/DR IN RCRD: CPT | Mod: CPTII,S$GLB,, | Performed by: OPHTHALMOLOGY

## 2024-05-31 PROCEDURE — 99999 PR PBB SHADOW E&M-EST. PATIENT-LVL III: CPT | Mod: PBBFAC,,, | Performed by: OPHTHALMOLOGY

## 2024-05-31 PROCEDURE — 1159F MED LIST DOCD IN RCRD: CPT | Mod: CPTII,S$GLB,, | Performed by: OPHTHALMOLOGY

## 2024-05-31 PROCEDURE — 92134 CPTRZ OPH DX IMG PST SGM RTA: CPT | Mod: S$GLB,,, | Performed by: OPHTHALMOLOGY

## 2024-05-31 PROCEDURE — 4010F ACE/ARB THERAPY RXD/TAKEN: CPT | Mod: CPTII,S$GLB,, | Performed by: OPHTHALMOLOGY

## 2024-05-31 RX ORDER — TRIFAROTENE 50 UG/G
CREAM TOPICAL
COMMUNITY
Start: 2024-04-09

## 2024-05-31 NOTE — PROGRESS NOTES
HPI    Urgent blurry va    Pt states last night she took her CL out before a shower and noticed   blurry va even after using gas perm solution to rinse. Pt advises OU was   still blurry when going to bed but woke up resolved this morning. Pt   states she did have some haloing last night but not headache or pain.  Pt h/o floaters, no increase.    No flashes  No floaters today  No pain  No photophobia  No gtts      Last edited by Carito Contreras on 5/31/2024 10:53 AM.         A/P    ICD-10-CM ICD-9-CM   1. Vitreomacular adhesion of both eyes  H43.823 379.27       1. Vitreomacular adhesion of both eyes  Pt here for new visual changes that occurred yday  Noted to have blurred vision and halos, woke up today and vision was back to normal    Exam notable for VMA OU, no RT/RD on exam  Plan: observe for now, symptoms may overlap with blood pressure fluctuations vs lenticular hydration if blood sugars were labile. Has small c/d ratio but unlikely impending NAION given symptoms. Monitor, return precautions stressed if having worsening symptoms    RTC [facility] for Mrx, me prn      I saw and examined the patient and reviewed in detail the findings documented. The final examination findings, image interpretations which have been independently interpreted, and plan as documented in the record represent my personal judgment and conclusions.    Kwaku Severino MD  Vitreoretinal Surgery   [facility]

## 2024-06-04 ENCOUNTER — PATIENT MESSAGE (OUTPATIENT)
Dept: OPTOMETRY | Facility: CLINIC | Age: 49
End: 2024-06-04
Payer: COMMERCIAL

## 2024-06-12 ENCOUNTER — OFFICE VISIT (OUTPATIENT)
Dept: OPTOMETRY | Facility: CLINIC | Age: 49
End: 2024-06-12
Payer: COMMERCIAL

## 2024-06-12 DIAGNOSIS — Z97.3 WEARS CONTACT LENSES: ICD-10-CM

## 2024-06-12 DIAGNOSIS — H52.13 MYOPIA OF BOTH EYES WITH ASTIGMATISM AND PRESBYOPIA: Primary | ICD-10-CM

## 2024-06-12 DIAGNOSIS — H52.203 MYOPIA OF BOTH EYES WITH ASTIGMATISM AND PRESBYOPIA: Primary | ICD-10-CM

## 2024-06-12 DIAGNOSIS — H52.4 MYOPIA OF BOTH EYES WITH ASTIGMATISM AND PRESBYOPIA: Primary | ICD-10-CM

## 2024-06-12 PROCEDURE — 92014 COMPRE OPH EXAM EST PT 1/>: CPT | Mod: ,,, | Performed by: OPTOMETRIST

## 2024-06-12 PROCEDURE — 99999 PR PBB SHADOW E&M-EST. PATIENT-LVL III: CPT | Mod: PBBFAC,,, | Performed by: OPTOMETRIST

## 2024-06-12 PROCEDURE — 92310 CONTACT LENS FITTING OU: CPT | Mod: CSM,S$GLB,, | Performed by: OPTOMETRIST

## 2024-06-12 PROCEDURE — 92015 DETERMINE REFRACTIVE STATE: CPT | Mod: ,,, | Performed by: OPTOMETRIST

## 2024-06-12 NOTE — PROGRESS NOTES
HPI    Annual Exam   Pt states vision is poor @ Near   Pt interested in multifocal lens   Pt reports intermediate and distance are her priority   OTC +1.50  Disinfection & Solution: [place]     Last edited by Meggan Gramajo on 6/12/2024  8:30 AM.            Assessment /Plan     For exam results, see Encounter Report.    Myopia of both eyes with astigmatism and presbyopia  Wears contact lenses  -Eyemed  Eyeglass Final Rx       Eyeglass Final Rx         Sphere Cylinder Axis Dist VA Add    Right -8.00 +0.50 095 20/20 +1.75    Left -8.25 +0.75 080 20/20 +1.75      Type: PAL    Expiration Date: 6/12/2025                  Contact Lens Final Rx       Final Contact Lens Rx         Brand Base Curve Diameter Sphere Cylinder Add    Right TruForm Solitaire  FSA 7.5 9.5 -6.87 Sphere +2.00    Left TruForm Solitaire  FSA 7.5 9.5 -6.87 Sphere +2.00   On order Truform                 Custom order above, confirm numbers in chart may be different then above       RTC dispense

## 2024-06-21 ENCOUNTER — OFFICE VISIT (OUTPATIENT)
Dept: OPTOMETRY | Facility: CLINIC | Age: 49
End: 2024-06-21
Payer: COMMERCIAL

## 2024-06-21 DIAGNOSIS — H52.4 MYOPIA OF BOTH EYES WITH ASTIGMATISM AND PRESBYOPIA: Primary | ICD-10-CM

## 2024-06-21 DIAGNOSIS — H52.13 MYOPIA OF BOTH EYES WITH ASTIGMATISM AND PRESBYOPIA: Primary | ICD-10-CM

## 2024-06-21 DIAGNOSIS — H52.203 MYOPIA OF BOTH EYES WITH ASTIGMATISM AND PRESBYOPIA: Primary | ICD-10-CM

## 2024-06-21 NOTE — PROGRESS NOTES
Good comfort, with noticeable improvement to near  Does feel things are still blurry          Assessment /Plan     For exam results, see Encounter Report.    Myopia of both eyes with astigmatism and presbyopia  -Warranty Exchange  Contact Lens Final Rx       Final Contact Lens Rx         Brand Base Curve Diameter Sphere Cylinder Add    Right TruForm Solitaire  FSA 7.5 9.5 -6.62 Sphere +2.00    Left TruForm Solitaire  FSA 7.46 9.5 -6.62 Sphere +2.00   On order Truform                     RTC dispense ASAP going to [place] 6/28

## 2024-06-23 ENCOUNTER — PATIENT MESSAGE (OUTPATIENT)
Dept: OPTOMETRY | Facility: CLINIC | Age: 49
End: 2024-06-23
Payer: COMMERCIAL

## 2024-07-08 PROBLEM — Z00.00 HEALTHCARE MAINTENANCE: Status: RESOLVED | Noted: 2024-04-05 | Resolved: 2024-07-08

## 2024-07-18 ENCOUNTER — OFFICE VISIT (OUTPATIENT)
Dept: OPTOMETRY | Facility: CLINIC | Age: 49
End: 2024-07-18
Payer: COMMERCIAL

## 2024-07-18 DIAGNOSIS — H52.203 MYOPIA OF BOTH EYES WITH ASTIGMATISM AND PRESBYOPIA: Primary | ICD-10-CM

## 2024-07-18 DIAGNOSIS — H52.4 MYOPIA OF BOTH EYES WITH ASTIGMATISM AND PRESBYOPIA: Primary | ICD-10-CM

## 2024-07-18 DIAGNOSIS — I10 PRIMARY HYPERTENSION: Primary | ICD-10-CM

## 2024-07-18 DIAGNOSIS — H52.13 MYOPIA OF BOTH EYES WITH ASTIGMATISM AND PRESBYOPIA: Primary | ICD-10-CM

## 2024-07-18 PROCEDURE — 99499 UNLISTED E&M SERVICE: CPT | Mod: S$GLB,,, | Performed by: OPTOMETRIST

## 2024-07-23 ENCOUNTER — PATIENT MESSAGE (OUTPATIENT)
Dept: OPTOMETRY | Facility: CLINIC | Age: 49
End: 2024-07-23
Payer: COMMERCIAL

## 2024-07-23 NOTE — PROGRESS NOTES
HPI    CL FU   Pt reports vision could be better   Pt brought old contacts with her  Last edited by Meggan Gramajo on 7/18/2024 10:14 AM.            Assessment /Plan     For exam results, see Encounter Report.    Myopia of both eyes with astigmatism and presbyopia  Contact Lens Final Rx       Final Contact Lens Rx         Brand Base Curve Diameter Sphere Cylinder Add    Right TruForm Solitaire  FSA 7.38 9.0 -7.37 Sphere +2.00    Left TruForm Solitaire  FSA 7.34 9.0 -7.37 Sphere +2.00   Warranty exchange     On order                     RTC dispense

## 2024-07-30 ENCOUNTER — PATIENT MESSAGE (OUTPATIENT)
Dept: OPTOMETRY | Facility: CLINIC | Age: 49
End: 2024-07-30
Payer: COMMERCIAL

## 2024-08-16 DIAGNOSIS — E03.9 ACQUIRED HYPOTHYROIDISM: Primary | ICD-10-CM

## 2024-08-16 RX ORDER — THYROID 60 MG/1
60 TABLET ORAL DAILY
Qty: 90 TABLET | Refills: 3 | Status: SHIPPED | OUTPATIENT
Start: 2024-08-16

## 2024-08-26 ENCOUNTER — OFFICE VISIT (OUTPATIENT)
Dept: OPTOMETRY | Facility: CLINIC | Age: 49
End: 2024-08-26
Payer: COMMERCIAL

## 2024-08-26 DIAGNOSIS — H52.13 MYOPIA OF BOTH EYES WITH ASTIGMATISM AND PRESBYOPIA: Primary | ICD-10-CM

## 2024-08-26 DIAGNOSIS — H52.203 MYOPIA OF BOTH EYES WITH ASTIGMATISM AND PRESBYOPIA: Primary | ICD-10-CM

## 2024-08-26 DIAGNOSIS — H52.4 MYOPIA OF BOTH EYES WITH ASTIGMATISM AND PRESBYOPIA: Primary | ICD-10-CM

## 2024-08-26 PROCEDURE — 99999 PR PBB SHADOW E&M-EST. PATIENT-LVL III: CPT | Mod: PBBFAC,,, | Performed by: OPTOMETRIST

## 2024-08-26 PROCEDURE — 99499 UNLISTED E&M SERVICE: CPT | Mod: S$GLB,,, | Performed by: OPTOMETRIST

## 2024-08-26 NOTE — PROGRESS NOTES
HPI    CL FU   Pt reports vision is poor @ far   Pt reports near is better   Pt reports comfort is poor OS   Pt reports comfort is good OD   Last edited by Meggan Gramajo on 8/26/2024  1:02 PM.            Assessment /Plan     For exam results, see Encounter Report.    Myopia of both eyes with astigmatism and presbyopia  -switch to translating CLs  Eyeglass Final Rx       Eyeglass Final Rx         Sphere Cylinder Axis Add    Right -8.00 +0.50 095 +1.75    Left -8.25 +0.75 080 +1.75      Type: PAL    Expiration Date: 8/26/2025                  Contact Lens Final Rx       Final Contact Lens Rx         Brand Base Curve Diameter Sphere Cylinder Add Lens Addl. Specs    Right Solitaire Thin 7.4 9.8/9.5 -7.00 Sphere +2.00 prism 2.0 seg 4.0    Left Solitaire Thin 7.4 9.8/9.5 -7.25 Sphere +2.00 prism 2.0 seg 4.0   Warranty exchange                       RTC dispense

## 2024-08-28 ENCOUNTER — PATIENT MESSAGE (OUTPATIENT)
Dept: OPTOMETRY | Facility: CLINIC | Age: 49
End: 2024-08-28
Payer: COMMERCIAL

## 2024-09-03 ENCOUNTER — PATIENT MESSAGE (OUTPATIENT)
Dept: OPTOMETRY | Facility: CLINIC | Age: 49
End: 2024-09-03
Payer: COMMERCIAL

## 2024-09-06 ENCOUNTER — OFFICE VISIT (OUTPATIENT)
Dept: OPTOMETRY | Facility: CLINIC | Age: 49
End: 2024-09-06
Payer: COMMERCIAL

## 2024-09-06 DIAGNOSIS — H52.203 MYOPIA OF BOTH EYES WITH ASTIGMATISM AND PRESBYOPIA: Primary | ICD-10-CM

## 2024-09-06 DIAGNOSIS — H52.4 MYOPIA OF BOTH EYES WITH ASTIGMATISM AND PRESBYOPIA: Primary | ICD-10-CM

## 2024-09-06 DIAGNOSIS — H52.13 MYOPIA OF BOTH EYES WITH ASTIGMATISM AND PRESBYOPIA: Primary | ICD-10-CM

## 2024-09-06 NOTE — PROGRESS NOTES
HPI    Dispense translating Bifocal   Pt reports near vision is great   Pt reports vision at distance is poor   Pt reports comfort seemed good     Last edited by Meggan Gramajo on 9/6/2024  9:26 AM.            Assessment /Plan     For exam results, see Encounter Report.    Myopia of both eyes with astigmatism and presbyopia  -warranty remake  Contact Lens Final Rx       Final Contact Lens Rx         Brand Base Curve Diameter Sphere Cylinder Add Lens Addl. Specs    Right Solitaire Thin  hydro2 7.4 9.8/9.5 -7.00 Sphere +2.00 prism 2.0 seg 3.0    Left Solitaire Thin  hydro2 7.4 9.8/9.5 -7.25 Sphere +2.00 prism 2.0 seg 3.0                      RTC dispense

## 2024-09-08 ENCOUNTER — PATIENT MESSAGE (OUTPATIENT)
Dept: OPTOMETRY | Facility: CLINIC | Age: 49
End: 2024-09-08
Payer: COMMERCIAL

## 2024-09-23 ENCOUNTER — OFFICE VISIT (OUTPATIENT)
Dept: OPTOMETRY | Facility: CLINIC | Age: 49
End: 2024-09-23
Payer: COMMERCIAL

## 2024-09-23 DIAGNOSIS — H52.203 MYOPIA OF BOTH EYES WITH ASTIGMATISM AND PRESBYOPIA: Primary | ICD-10-CM

## 2024-09-23 DIAGNOSIS — H52.13 MYOPIA OF BOTH EYES WITH ASTIGMATISM AND PRESBYOPIA: Primary | ICD-10-CM

## 2024-09-23 DIAGNOSIS — H52.4 MYOPIA OF BOTH EYES WITH ASTIGMATISM AND PRESBYOPIA: Primary | ICD-10-CM

## 2024-09-23 PROCEDURE — 99499 UNLISTED E&M SERVICE: CPT | Mod: S$GLB,,, | Performed by: OPTOMETRIST

## 2024-09-23 NOTE — PROGRESS NOTES
HPI    Much improved VA at distance and near  Lens awareness bottom lid    Last edited by Fab Dewey, OD on 9/23/2024 10:58 AM.            Assessment /Plan     For exam results, see Encounter Report.    Myopia of both eyes with astigmatism and presbyopia  -Ok to dispense, build FTW  -Ok PHREV if change OD lower seg, OS raise seg if near limitations      RTC 1 wk PHREV

## 2024-10-03 NOTE — ASSESSMENT & PLAN NOTE
CT Cardiac Scoring from 4/5/24:  Impression:  1. Agatston calcium score:0.  2. Stable known calcified right lower lobe nodule.  Follow-up under Fleischner society guidelines is suggested.    **guidelines recommend for a 6 mm nodule to repeat imaging at 6-12 and 18-24 months  **recheck LDCT of chest in 4/2025 and then again in 4/2026

## 2024-10-03 NOTE — ASSESSMENT & PLAN NOTE
BMP  Lab Results   Component Value Date     04/05/2024    K 3.4 (L) 04/05/2024     04/05/2024    CO2 28 04/05/2024    BUN 16 04/05/2024    CREATININE 0.8 04/05/2024    CALCIUM 9.7 04/05/2024    ANIONGAP 4 (L) 04/05/2024    EGFRNORACEVR >60.0 04/05/2024   --PRA ordered but never done  **need to recheck K

## 2024-10-03 NOTE — PROGRESS NOTES
[facility] Provider Appointment  Yomi HOROWITZ)    Subjective:     History of Present Illness    CHIEF COMPLAINT:  Nan presents today for follow up on persistent jaw pain, ear pain, and tenderness around maxillary sinus following a root canal one month ago.    DENTAL ISSUES:  She reports persistent jaw pain, ear pain, and tenderness around the maxillary sinus following a root canal on the bottom last tooth one month ago. She denies fever, chills, or night sweats. She is scheduled to see the dentist for follow-up care and permanent filling placement. She expresses concern about the ongoing discomfort and its potential duration.    MEDICATIONS:  She is taking ibuprofen 600mg daily in the evening for dental pain. She uses topical diclofenac gel for occasional hand and thumb pain flare-ups.    CARDIOVASCULAR HEALTH:  She reports well-controlled blood pressure readings at home through the [employer] medicine program, though it was slightly elevated during the current visit. She had a recent coronary calcium score of zero, indicating no calcified plaques. She has a history of radiation therapy for lymphoma, which increases her risk of premature cardiovascular disease. However, given the calcium score results, there is currently no need for statin, lipid-lowering medications, or aspirin therapy.    PULMONARY:  She has a 6mm well-defined nodule in the right lower lobe of the lung, incidentally found on a coronary calcium CT. The nodule has remained stable since the previous calcium CT. She denies any history of smoking or other risk factors for lung cancer.    MUSCULOSKELETAL:  She reports occasional flare-ups of hand and thumb pain, localized to the first left metacarpophalangeal joint, previously suspected to be early carpometacarpal phalangeal arthritis. She denies experiencing constant or severe pain in the affected area.    PREVENTIVE CARE:  She reports attending her yearly checkup at [facility] and confirms completion  of her mammogram, which was reported as normal.    LABORATORY:  She reports a previous low potassium level and is aware of a pending plasma renin activity test.    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      ROS:  General: no fever, no chills, no fatigue, no weight gain, no weight loss, no night sweats  Eyes: no vision changes, no redness, no discharge  ENT: reports ear pain, no nasal congestion, no sore throat  Cardiovascular: no chest pain, no palpitations, no lower extremity edema  Respiratory: no cough, no shortness of breath  Gastrointestinal: no abdominal pain, no nausea, no vomiting, no diarrhea, no constipation, no blood in stool  Genitourinary: no dysuria, no hematuria, no frequency  Musculoskeletal: reports joint pain, no muscle pain  Skin: no rash, no lesion  Neurological: no headache, no dizziness, no numbness, no tingling  Psychiatric: no anxiety, no depression, no sleep difficulty               4/5/2024   PHQ-9 Depression Patient Health Questionnaire   Over the last two weeks how often have you been bothered by little interest or pleasure in doing things 0   Over the last two weeks how often have you been bothered by feeling down, depressed or hopeless 0            3/3/2023     1:06 PM   GAD7   1. Feeling nervous, anxious, or on edge? 1   2. Not being able to stop or control worrying? 0   3. Worrying too much about different things? 1   4. Trouble relaxing? 0   5. Being so restless that it is hard to sit still? 0   6. Becoming easily annoyed or irritable? 1   7. Feeling afraid as if something awful might happen? 0   8. If you checked off any problems, how difficult have these problems made it for you to do your work, take care of things at home, or get along with other people? 0   BELKIS-7 Score 3       Social History     Socioeconomic History    Marital status:    Tobacco Use    Smoking status: Never    Smokeless  "tobacco: Never   Substance and Sexual Activity    Alcohol use: Yes     Comment: social    Drug use: Never    Sexual activity: Yes     Partners: Male     Birth control/protection: See Surgical Hx, Partner-Vasectomy     Comment:  - Vas         Objective:   Mental Health Screening  PHQ-9       BELKIS-7       Vital Signs  /80   Pulse 71   Resp 18   Ht 5' 3" (1.6 m)   Wt 63 kg (138 lb 14.2 oz)   LMP 02/21/2022   SpO2 99%   BMI 24.60 kg/m²     Physical Exam    General: Well-developed. Well-nourished. No acute distress.  Eyes: EOMI. Sclerae anicteric.  HENT: Normocephalic. Atraumatic. Nares patent. Moist oral mucosa. No tenderness in oral mucosa, no erythema or purulent drainage.   Cardiovascular: Regular rate. Regular rhythm. No murmurs. No rubs. No gallops. Normal S1, S2.  Respiratory: Normal respiratory effort. Clear to auscultation bilaterally. No rales. No rhonchi. No wheezing.  Musculoskeletal: No  obvious deformity.  Extremities: No lower extremity edema.  Neurological: Alert & oriented x3. No slurred speech. Normal gait.  Psychiatric: Normal mood. Normal affect. Good insight. Good judgment.  Skin: Warm. Dry. No rash.           Assessment:   49 y.o. female here for primary care visit       Plan:   1. Primary hypertension  Overview:  Current regimen:  Exforge-HCT 5-16-25 mg daily    Assessment & Plan:  BMP  Lab Results   Component Value Date     04/05/2024    K 3.4 (L) 04/05/2024     04/05/2024    CO2 28 04/05/2024    BUN 16 04/05/2024    CREATININE 0.8 04/05/2024    CALCIUM 9.7 04/05/2024    ANIONGAP 4 (L) 04/05/2024    EGFRNORACEVR >60.0 04/05/2024   --PRA ordered but never done  **need to recheck K    Orders:  -     BASIC METABOLIC PANEL; Future; Expected date: 10/04/2024    2. At increased risk for cardiovascular disease  Overview:  Due to mantle XRT/chemoRx for Hodgkin's Lymphoma    Assessment & Plan:  CT Cardiac Scoring from 4/5/24:  Impression:  1. Agatston calcium score:0.  2. " Stable known calcified right lower lobe nodule.  Follow-up under Fleischner society guidelines is suggested.    **guidelines recommend for a 6 mm nodule to repeat imaging at 6-12 and 18-24 months  **recheck LDCT of chest in 4/2025 and then again in 4/2026      3. Acquired hypothyroidism  Overview:  Thyroid armour 60 mg daily     Assessment & Plan:  Lab Results   Component Value Date    TSH 0.567 04/05/2024         4. Asymptomatic microscopic hematuria  Assessment & Plan:  Repeat UA was normal >> no further hematuria and had negative eval with CT urogram        Follow up in about 6 months (around 4/4/2025).    Yomi Jones MD/[facility]  Internal Medicine  [facility]

## 2024-10-04 ENCOUNTER — OFFICE VISIT (OUTPATIENT)
Dept: PRIMARY CARE CLINIC | Facility: CLINIC | Age: 49
End: 2024-10-04
Attending: INTERNAL MEDICINE
Payer: COMMERCIAL

## 2024-10-04 ENCOUNTER — LAB VISIT (OUTPATIENT)
Dept: LAB | Facility: HOSPITAL | Age: 49
End: 2024-10-04
Attending: INTERNAL MEDICINE
Payer: COMMERCIAL

## 2024-10-04 VITALS
HEIGHT: 63 IN | OXYGEN SATURATION: 99 % | SYSTOLIC BLOOD PRESSURE: 128 MMHG | BODY MASS INDEX: 24.61 KG/M2 | HEART RATE: 71 BPM | DIASTOLIC BLOOD PRESSURE: 80 MMHG | WEIGHT: 138.88 LBS | RESPIRATION RATE: 18 BRPM

## 2024-10-04 DIAGNOSIS — R31.21 ASYMPTOMATIC MICROSCOPIC HEMATURIA: ICD-10-CM

## 2024-10-04 DIAGNOSIS — E03.9 ACQUIRED HYPOTHYROIDISM: ICD-10-CM

## 2024-10-04 DIAGNOSIS — I10 PRIMARY HYPERTENSION: ICD-10-CM

## 2024-10-04 DIAGNOSIS — Z91.89 AT INCREASED RISK FOR CARDIOVASCULAR DISEASE: ICD-10-CM

## 2024-10-04 DIAGNOSIS — I10 PRIMARY HYPERTENSION: Primary | ICD-10-CM

## 2024-10-04 LAB
ANION GAP SERPL CALC-SCNC: 9 MMOL/L (ref 8–16)
BUN SERPL-MCNC: 16 MG/DL (ref 6–20)
CALCIUM SERPL-MCNC: 10.1 MG/DL (ref 8.7–10.5)
CHLORIDE SERPL-SCNC: 103 MMOL/L (ref 95–110)
CO2 SERPL-SCNC: 28 MMOL/L (ref 23–29)
CREAT SERPL-MCNC: 0.8 MG/DL (ref 0.5–1.4)
EST. GFR  (NO RACE VARIABLE): >60 ML/MIN/1.73 M^2
GLUCOSE SERPL-MCNC: 90 MG/DL (ref 70–110)
POTASSIUM SERPL-SCNC: 3.7 MMOL/L (ref 3.5–5.1)
SODIUM SERPL-SCNC: 140 MMOL/L (ref 136–145)

## 2024-10-04 PROCEDURE — 36415 COLL VENOUS BLD VENIPUNCTURE: CPT | Performed by: INTERNAL MEDICINE

## 2024-10-04 PROCEDURE — 80048 BASIC METABOLIC PNL TOTAL CA: CPT | Performed by: INTERNAL MEDICINE

## 2024-10-04 PROCEDURE — 84244 ASSAY OF RENIN: CPT | Performed by: INTERNAL MEDICINE

## 2024-10-08 LAB — RENIN PLAS-CCNC: 2 NG/ML/H

## 2024-10-28 ENCOUNTER — PATIENT MESSAGE (OUTPATIENT)
Dept: OPTOMETRY | Facility: CLINIC | Age: 49
End: 2024-10-28
Payer: COMMERCIAL

## 2024-11-01 ENCOUNTER — OFFICE VISIT (OUTPATIENT)
Dept: OPTOMETRY | Facility: CLINIC | Age: 49
End: 2024-11-01
Payer: COMMERCIAL

## 2024-11-01 DIAGNOSIS — H52.13 MYOPIA OF BOTH EYES WITH ASTIGMATISM AND PRESBYOPIA: Primary | ICD-10-CM

## 2024-11-01 DIAGNOSIS — H52.4 MYOPIA OF BOTH EYES WITH ASTIGMATISM AND PRESBYOPIA: Primary | ICD-10-CM

## 2024-11-01 DIAGNOSIS — H52.203 MYOPIA OF BOTH EYES WITH ASTIGMATISM AND PRESBYOPIA: Primary | ICD-10-CM

## 2024-11-01 PROCEDURE — 99499 UNLISTED E&M SERVICE: CPT | Mod: S$GLB,,, | Performed by: OPTOMETRIST

## 2024-11-04 NOTE — PROGRESS NOTES
HPI    Distance is good   Pt reports some tilting still needed   Pt reports poor near vision   Last edited by Meggan Gramajo on 11/1/2024  8:28 AM.            Assessment /Plan     For exam results, see Encounter Report.    Myopia of both eyes with astigmatism and presbyopia  Contact Lens Final Rx       Final Contact Lens Rx         Brand Base Curve Diameter Sphere Cylinder Add Lens Addl. Specs    Right Solitaire Thin  hydro2 7.4 9.8/9.5 -6.75 Sphere +2.00 prism 2.0 seg 3.2    Left Solitaire Thin  hydro2 7.4 9.8/9.5 -7.25 Sphere +2.00 prism 2.0 seg 2.7   Warranty exchange                     RTC OK to dispense, 1 wk PHREV

## 2024-11-17 ENCOUNTER — PATIENT MESSAGE (OUTPATIENT)
Dept: OPTOMETRY | Facility: CLINIC | Age: 49
End: 2024-11-17
Payer: COMMERCIAL

## 2025-01-06 ENCOUNTER — PATIENT MESSAGE (OUTPATIENT)
Dept: OPTOMETRY | Facility: CLINIC | Age: 50
End: 2025-01-06
Payer: COMMERCIAL

## 2025-02-03 DIAGNOSIS — M79.642 PAIN OF LEFT HAND: Primary | ICD-10-CM

## 2025-02-03 DIAGNOSIS — M79.642 PAIN OF LEFT HAND: ICD-10-CM

## 2025-02-03 RX ORDER — DICLOFENAC SODIUM 10 MG/G
2 GEL TOPICAL 4 TIMES DAILY
Qty: 100 G | Refills: 2 | Status: SHIPPED | OUTPATIENT
Start: 2025-02-03

## 2025-02-03 NOTE — ASSESSMENT & PLAN NOTE
See A/P below  --in addition to recommendations below, she is requesting ortho eval for injection  **refer to ortho

## 2025-02-03 NOTE — ASSESSMENT & PLAN NOTE
X-ray left hand/wrist 4/5/24:  FINDINGS:  Mild DJD, suspect early OA/DJD of MCP joint.  No fracture or dislocation.  No bone destruction identified.    She messaged on 2/3/25 complaining of recurrence of pain in that 1st CMC joint of left hand:  **refer to PT for strengthening exercises of intrinsic muscles of left hand  **refilled Diclofenac gel  **detailed education/instructions sent through patient message on 2/3/25

## 2025-02-18 ENCOUNTER — PATIENT MESSAGE (OUTPATIENT)
Dept: PRIMARY CARE CLINIC | Facility: CLINIC | Age: 50
End: 2025-02-18
Payer: COMMERCIAL

## 2025-02-18 ENCOUNTER — OFFICE VISIT (OUTPATIENT)
Dept: ORTHOPEDICS | Facility: CLINIC | Age: 50
End: 2025-02-18
Payer: COMMERCIAL

## 2025-02-18 DIAGNOSIS — M79.642 PAIN OF LEFT HAND: ICD-10-CM

## 2025-02-18 DIAGNOSIS — M18.12 PRIMARY OSTEOARTHRITIS OF FIRST CARPOMETACARPAL JOINT OF LEFT HAND: Primary | ICD-10-CM

## 2025-02-18 PROCEDURE — 1159F MED LIST DOCD IN RCRD: CPT | Mod: CPTII,S$GLB,, | Performed by: SPECIALIST/TECHNOLOGIST

## 2025-02-18 PROCEDURE — 99204 OFFICE O/P NEW MOD 45 MIN: CPT | Mod: 25,S$GLB,, | Performed by: SPECIALIST/TECHNOLOGIST

## 2025-02-18 PROCEDURE — 20600 DRAIN/INJ JOINT/BURSA W/O US: CPT | Mod: LT,S$GLB,, | Performed by: SPECIALIST/TECHNOLOGIST

## 2025-02-18 PROCEDURE — 76000 FLUOROSCOPY <1 HR PHYS/QHP: CPT | Mod: S$GLB,,, | Performed by: SPECIALIST/TECHNOLOGIST

## 2025-02-18 RX ORDER — DEXAMETHASONE SODIUM PHOSPHATE 4 MG/ML
4 INJECTION, SOLUTION INTRA-ARTICULAR; INTRALESIONAL; INTRAMUSCULAR; INTRAVENOUS; SOFT TISSUE
Status: DISCONTINUED | OUTPATIENT
Start: 2025-02-18 | End: 2025-02-18 | Stop reason: HOSPADM

## 2025-02-18 RX ADMIN — DEXAMETHASONE SODIUM PHOSPHATE 4 MG: 4 INJECTION, SOLUTION INTRA-ARTICULAR; INTRALESIONAL; INTRAMUSCULAR; INTRAVENOUS; SOFT TISSUE at 08:02

## 2025-02-18 NOTE — PROGRESS NOTES
Patient ID: Nan Mendez is a 49 y.o. female.    Chief Complaint: Pain of the Left Hand    History of Present Illness    CHIEF COMPLAINT:  - Left thumb base pain    HPI:  Nan presents with left thumb base pain that has been ongoing and worsening, affecting her daily life. She describes it as severe and highly sensitive, stating that it affects her ability to grasp objects or apply pressure to open items. She denies numbness, tingling, or visible swelling.    She had an X-ray done by Dr. Yomi Jones in April of last year, after which she had no problems. However, the pain has recently returned. She tried Voltaren cream without relief.    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 She denies any recent increase in activities or crafting, despite it being [place] season.    She also mentions having high blood pressure and uses CBD oil for pain management. Nan denies any history of diabetes and mentions having a fiancé.    PREVIOUS TREATMENTS:  - Voltaren cream: Nan reported it did not provide relief.    MEDICATIONS:  - Voltaren cream: topical, did not help with the pain.    ALLERGIES:  - No allergies to lidocaine or steroids mentioned.    WORK STATUS:  - Stay-at-home mom  - Performs various household activities  - Left thumb base pain affecting daily life, making it difficult to grasp objects or apply pressure to open items    ROS:  ROS as indicated in HPI.          Hand/Wrist Musculoskeletal Exam    Inspection    Right      Erythema: none      Ecchymosis: none      Edema: none      Deformity: none    Left      Erythema: none      Ecchymosis: none      Edema: none      Deformity: none    Palpation    Left      Thumb tenderness to palpation: carpometacarpal joint      Dorsal hand - 1st metacarpal tenderness to palpation: CMC    Range of Motion    Right Hand      Right hand range of motion is normal.      Left Hand      Left hand range of motion is normal.    "    Right Wrist      Right wrist range of motion is normal.      Left Wrist      Left wrist range of motion is normal.      Neurovascular    Right       Radial pulse: normal      Capillary refill: <3 sec and brisk      Ulnar nerve sensory distribution: normal      Median nerve sensory distribution: normal      Superficial radial nerve sensory distribution: normal    Left       Radial pulse: normal      Capillary refill: brisk and <3 sec      Ulnar nerve sensory distribution: normal      Median nerve sensory distribution: normal      Superficial radial nerve sensory distribution: normal    Special Tests    Left      CMC grind test: positive      Physical Exam    MSK: Hand/Wrist - Left: Tenderness at left thumb base.  MSK: Hand/Wrist - Right: No tenderness on right side.  IMAGING:  - X-ray of the left thumb base: April 2024, showed CMC arthritis, described as probably stage one or two, with a "bone on bone appearance" and a small loose body visible.           IMAGING  Left hand XR  Personal interpretation of the XR reveals no signs of fractures or dislocations. Basilar thumb arthritis present.       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  - Administered left thumb steroid injection.  - Explained potential post-injection soreness and expected timeline for pain relief.  - Discussed possible future arthroscopic surgery to clean up the joint if injection is ineffective.  - Use neoprene brace with custom mold when thumb is bothersome.  - Compound cream (similar to Voltaren but stronger).  - Apply CBD oil topically to affected area.  - Follow up in 3 months or sooner if needed for potential repeat injection.          PROCEDURE NOTE  Small Joint Aspiration/Injection: L thumb CMC    Date/Time: 2/18/2025 8:30 AM    Performed by: Placido Curz PA-C  Authorized by: Placido Cruz PA-C    Consent Done?:  " Yes (Verbal)  Indications:  Pain  Local anesthesia used?: Yes    Anesthesia:  Local infiltration  Local anesthetic:  Lidocaine 1% without epinephrine  Anesthetic total (ml):  0.5    Location:  Thumb  Site:  L thumb CMC  Imaging guidance used: Fluoro guidance was utilized for needle localization. Dynamic visualization of the needle was continuous throughout the procedure and maintained accurate placement. Images were saved and stored for documentation..    Needle size:  25 G  Approach:  Radial  Medications:  4 mg dexAMETHasone 4 mg/mL  Patient tolerance:  Patient tolerated the procedure well with no immediate complications      Manny Cruz PA-C, ATC  Hand and Upper Extremity   [facility]    This note was generated with the assistance of ambient listening technology. Verbal consent was obtained by the patient and accompanying visitor(s) for the recording of patient appointment to facilitate this note. I attest to having reviewed and edited the generated note for accuracy, though some syntax or spelling errors may persist. Please contact the author of this note for any clarification.

## 2025-02-20 ENCOUNTER — CLINICAL SUPPORT (OUTPATIENT)
Dept: REHABILITATION | Facility: HOSPITAL | Age: 50
End: 2025-02-20
Payer: COMMERCIAL

## 2025-02-20 DIAGNOSIS — G89.29 CHRONIC PAIN OF LEFT THUMB: Primary | ICD-10-CM

## 2025-02-20 DIAGNOSIS — M79.645 CHRONIC PAIN OF LEFT THUMB: Primary | ICD-10-CM

## 2025-02-20 DIAGNOSIS — M79.642 PAIN OF LEFT HAND: ICD-10-CM

## 2025-02-20 PROCEDURE — L3807 WHFO W/O JOINTS PRE CST: HCPCS

## 2025-02-20 PROCEDURE — 97165 OT EVAL LOW COMPLEX 30 MIN: CPT

## 2025-02-20 PROCEDURE — 97110 THERAPEUTIC EXERCISES: CPT

## 2025-02-20 NOTE — PROGRESS NOTES
Outpatient Rehab    Occupational Therapy Evaluation    Patient Name: Nan Mendez  MRN: [id number]  YOB: 1975  Today's Date: 2025    Therapy Diagnosis:   Encounter Diagnoses   Name Primary?    Pain of left hand     Chronic pain of left thumb Yes     Physician: Yomi Jones MD    Physician Orders: Eval and Treat  Medical Diagnosis: Pain of left hand [M79.642]        Visit # / Visits Authorized:     Date of Evaluation:  2025   Insurance Authorization Period: 2/3/25 to 2/3/26  Plan of Care Certification:  2025 to 25      Time In: 0910   Time Out: 1000  Total Time: 50   Total Billable Time: 45    Intake Outcome Measure for FOTO Survey    Therapist reviewed FOTO scores for Nan Mendez on 2025.   FOTO report - see Media section or FOTO account episode details.     Intake Score: 45%         Subjective   History of Present Illness  Nan is a 49 y.o. female who reports to occupational therapy with a chief concern of Pt reports that left CMC pain has been X 4 months. According to the patient's chart, Nan has a past medical history of Acquired hypothyroidism, Cancer, Hypertension, and Infertility, female. Nan has a past surgical history that includes  section (2004);  section (2011); Carpal tunnel release (Bilateral); Abdominal surgery; Robot-assisted laparoscopic abdominal hysterectomy using da Giovani Xi (N/A, 3/14/2022); and Robot-assisted laparoscopic lysis of adhesions using da Giovani Xi (N/A, 3/14/2022).    The patient reports a medical diagnosis of Pain of left hand (M79.642).  Patient reports a surgery of no surgery.               Activities of Daily Living  Social history was obtained from Patient.          Patient Responsibilities: Community mobility, Driving, Home management    Previously independent with activities of daily living? Yes     Currently independent with activities of daily living? Yes          Previously independent  with instrumental activities of daily living? Yes     Currently independent with instrumental activities of daily living? Yes              Pain     Patient reports a current pain level of 4/10. Pain at best is reported as 4/10. Pain at worst is reported as 10/10.   Location: left CMC motions  Clinical Progression (since onset): Stable         Treatment History  Treatments  Previously Received Treatments: No    Living Arrangements  Living Situation  Living Arrangements: Spouse/significant other  Support Systems: Children        Employment  Employment Status: Not employed          Past Medical History/Physical Systems Review:   Nan Mendez  has a past medical history of Acquired hypothyroidism, Cancer, Hypertension, and Infertility, female.    Nan Mendez  has a past surgical history that includes  section (2004);  section (2011); Carpal tunnel release (Bilateral); Abdominal surgery; Robot-assisted laparoscopic abdominal hysterectomy using da Giovani Xi (N/A, 3/14/2022); and Robot-assisted laparoscopic lysis of adhesions using da Giovani Xi (N/A, 3/14/2022).    Nan has a current medication list which includes the following prescription(s): aklief, amlodipine-valsartan-hcthiazid, diclofenac sodium, multivitamin 50 plus, mupirocin, fish oil-omega-3 fatty acids, and thyroid (pork).    Review of patient's allergies indicates:  No Known Allergies     Objective   Orthosis Details             Orthosis Laterality: Left  Fabrication Status: Pre-fabricated  Orthosis Type: Static  Orthosis Design: Hand-based   - Short opponens/thumb spica                 Wrap or Tape  Wrap or Tape: Kinesiology taping  Wrap or Tape Details: K tape over CMC joint     Orthosis Purpose  Purpose of the patient's orthosis is to Improve functional use of extremity.      Orthosis Education      Provided instruction to wear the orthosis During functional activity.              Upper Extremity Sensation  General  Upper Extremity Sensation  Intact: Right  Right Upper Extremity Sensation  Intact: Sharp/Dull Discrimination, Kinesthesia, Proprioception, and Hot/Cold Discrimination  Right Upper Extremity Sensation Stocking Glove Pattern: No                  Wrist/Hand Swelling Details  Pitting Edema Details: none noted               Digit 1 - Thumb Active Range of Motion  Right Thumb   Flexion (deg) Extension (deg) Pain   CMC         MCP 40       IP 40         Right Thumb   Range (deg) Pain   Palmar ABduction 40     Radial ABduction 40     ADduction         Right Thumb Opposition Active Range of Motion: 2 (cm)                        Right  Strength  Right Hand Dynamometer Position: 2  Elbow Position Forearm Position Trial 1 (lbs) Trial 2  (lbs) Trial 3  (lbs) Average  (lbs) Pain   Flexed Neutral 36 33 34 34.33 Yes       Left  Strength  Left Hand Dynamometer Position: 2  Elbow Position Forearm Position Trial 1 (lbs) Trial 2 (lbs) Trial 3 (lbs) Average (lbs) Pain   Flexed Neutral 23 24 22 23         Right Pinch Strength   Trial 1 (lbs) Trial 2 (lbs) Trial 3 (lbs) Average (lbs) Pain   Lateral (Key Pinch) 9 10 10 9.67     Three Point (Three Jaw Aaron) 9 9 10 9.33     Two Point (Tip to Tip) 6 7 8 7         Left Pinch Strength   Trial 1 (lbs) Trial 2 (lbs) Trial 3 (lbs) Average (lbs) Pain   Lateral (Key Pinch) 7 7 8 7.33     Three Point (Three Jaw Aaron) 6 7 7 6.67     Two Point (Tip to Tip) 5 5 5 5                  Treatment:                      Modalities  Moist Heat (min): Pt received moist heat X 10 minutes     Assessment & Plan   Assessment  Nan presents with a condition of Low complexity.   Presentation of Symptoms: Stable       ADL Limitations : Feeding, Dressing  IADL Limitations: Grocery/shopping, Meal preparation and cleanup                            Plan  From an occupational therapy perspective, the patient would benefit from: Skilled Rehab Services                           This plan was discussed with Patient.    Discussion participants: Agreed Upon Plan of Care  Plan details: Outpatient Occupational Therapy 1 times weekly for 6 weeks may include the following interventions: Paraffin, Fluidotherapy, Manual therapy/joint mobilizations, Modalities for pain management, US 3 mhz, Therapeutic exercises/activities., Iontophoresis with 2.0 cc Dexamethasone, Strengthening, Orthotic Fabrication/Fit/Training, Edema Control, Scar Management, Wound Care, and Electrical Modalities.           Patient's spiritual, cultural, and educational needs considered and patient agreeable to plan of care and goals.     Education  Education was done with Patient.                   Goals:   Active       Functional outcome       Patient will show  increase in FOTO  by 10 pts patient-reported outcome tool to demonstrate subjective improvement       Start:  02/20/25    Expected End:  04/30/25               Hand and arm use        Patient will push open a door         Start:  02/20/25    Expected End:  04/30/25               Home activities       Patient will cut food with knife in left hand        Start:  02/20/25    Expected End:  04/30/25               Lifting & carrying objects       Patient will carry grocery bags and gallon of milk       Start:  02/20/25    Expected End:  04/30/25               Pain       Patient will report pain of 2/10 demonstrating a reduction of overall pain       Start:  02/20/25    Expected End:  04/30/25                Lilly Head OT

## 2025-02-25 ENCOUNTER — CLINICAL SUPPORT (OUTPATIENT)
Dept: REHABILITATION | Facility: HOSPITAL | Age: 50
End: 2025-02-25
Payer: COMMERCIAL

## 2025-02-25 ENCOUNTER — PATIENT MESSAGE (OUTPATIENT)
Dept: ORTHOPEDICS | Facility: CLINIC | Age: 50
End: 2025-02-25
Payer: COMMERCIAL

## 2025-02-25 DIAGNOSIS — G89.29 CHRONIC PAIN OF LEFT THUMB: ICD-10-CM

## 2025-02-25 DIAGNOSIS — M79.642 PAIN OF LEFT HAND: Primary | ICD-10-CM

## 2025-02-25 DIAGNOSIS — M79.645 CHRONIC PAIN OF LEFT THUMB: ICD-10-CM

## 2025-02-25 DIAGNOSIS — M18.12 PRIMARY OSTEOARTHRITIS OF FIRST CARPOMETACARPAL JOINT OF LEFT HAND: Primary | ICD-10-CM

## 2025-02-25 PROCEDURE — 97110 THERAPEUTIC EXERCISES: CPT

## 2025-02-25 PROCEDURE — 97140 MANUAL THERAPY 1/> REGIONS: CPT

## 2025-02-25 PROCEDURE — 97018 PARAFFIN BATH THERAPY: CPT

## 2025-02-25 RX ORDER — MELOXICAM 7.5 MG/1
7.5 TABLET ORAL DAILY
Qty: 30 TABLET | Refills: 0 | Status: SHIPPED | OUTPATIENT
Start: 2025-02-25 | End: 2025-03-06

## 2025-02-25 NOTE — PROGRESS NOTES
Outpatient Rehab    Occupational Therapy Visit    Patient Name: Nan Mendez  MRN: [id number]  YOB: 1975  Encounter Date: 2/25/2025    Therapy Diagnosis: No diagnosis found.  Physician: Yomi Jones MD    Therapy Diagnosis:   Encounter Diagnoses  Name Primary?  · Pain of left hand    · Chronic pain of left thumb Yes     Physician: Yomi Jones MD     Physician Orders: Eval and Treat  Medical Diagnosis: Pain of left hand [M79.642]       Visit # / Visits Authorized:  1 / 20   Date of Evaluation:  2/20/2025   Insurance Authorization Period: 2/3/25 to 2/3/26  Plan of Care Certification:  2/20/2025 to 4/30/25      Time In: 0830   Time Out: 0915  Total Time: 45   Total Billable Time: 40     FOTO:  Intake Score: 45%  Survey Score 1:  %  Survey Score 2:  %         Subjective   pt reports that she has pain and she does not think that cool comfort or injection has ,esteban much difference. she is going skiing  later this week and that she reports,she will reach out to MD when she returns to discuss further options.  Pain reported as 5/10. pain at base of CMC    Objective           Treatment:  Therapeutic Exercise  Therapeutic Exercise Activity 3: x 10 reps each: thumb circles CW/CCW,radial/palmar abduction/adduction, flexion, opposition to finger tips, thumb IP flex/ext, thumb lifts and isocmetric C holds 20 reps  Therapeutic Exercise Activity 4: also shown how to coban wrap in figure 8 pattern for pain releif when she goes skiing, that she can use wrap under her glove              Balance/Neuromuscular Re-Education  Balance/Neuromuscular Re-Education Activity 1: also shown how to coban wrap in figure 8 pattern for pain releif when she goes skiing, that she can use wrap under her ski glove    Modalities  Paraffin Bath: Patient received paraffin bath for to increase blood flow, circulation, pain management and for tissue elasticity prior to therex.     Assessment & Plan   Assessment: Pt reporting that  pain is still present and that she thinks after her trip , she will reach out to MD of other options including wanting to discuss surgery as an option.  Evaluation/Treatment Tolerance: Patient limited by pain    Patient will continue to benefit from skilled outpatient occupational therapy to address the deficits listed in the problem list box on initial evaluation, provide pt/family education and to maximize pt's level of independence in the home and community environment.     Patient's spiritual, cultural, and educational needs considered and patient agreeable to plan of care and goals.     Education  Education was done with Patient. The patient's learning style includes Demonstration. The patient Demonstrates understanding.         Figure 8 coban wrap option  for CMC report        Plan: Outpatient Occupational Therapy 1  times weekly to  include the following interventions: Paraffin, Fluidotherapy, Manual therapy/joint mobilizations, Modalities for pain management, US 3 mhz, Therapeutic exercises/activities., Iontophoresis with 2.0 cc Dexamethasone, Strengthening, Orthotic Fabrication/Fit/Training, Edema Control, Scar Management, Wound Care, and Electrical Modalities.    Goals:   Active       Functional outcome       Patient will show  increase in FOTO  by 10 pts patient-reported outcome tool to demonstrate subjective improvement (Progressing)       Start:  02/20/25    Expected End:  04/30/25               Hand and arm use        Patient will push open a door   (Progressing)       Start:  02/20/25    Expected End:  04/30/25               Home activities       Patient will cut food with knife in left hand  (Progressing)       Start:  02/20/25    Expected End:  04/30/25               Lifting & carrying objects       Patient will carry grocery bags and gallon of milk (Progressing)       Start:  02/20/25    Expected End:  04/30/25               Pain       Patient will report pain of 2/10 demonstrating a reduction of  overall pain (Progressing)       Start:  02/20/25    Expected End:  04/30/25                Lilly Head OT

## 2025-02-26 ENCOUNTER — TELEPHONE (OUTPATIENT)
Dept: ORTHOPEDICS | Facility: CLINIC | Age: 50
End: 2025-02-26
Payer: COMMERCIAL

## 2025-02-27 ENCOUNTER — TELEPHONE (OUTPATIENT)
Dept: ORTHOPEDICS | Facility: CLINIC | Age: 50
End: 2025-02-27
Payer: COMMERCIAL

## 2025-02-27 NOTE — TELEPHONE ENCOUNTER
Patient was called by VENTURA Prather no answer on 2/26/25.    Mario Leung MS, OTC   Sports Medicine Assistant   [facility]  (P) [phone]  (F) [phone]

## 2025-02-27 NOTE — TELEPHONE ENCOUNTER
----- Message from Michael Norman sent at 2/25/2025  1:55 PM CST -----    ----- Message -----  From: Varsha Rivas MA  Sent: 2/25/2025   1:29 PM CST  To: [facility] Staff    Good afternoon,I received a request from Trupti Loya([facility] to [facility] [place]) for Mrs. Mendez to be seen by Dr. Cohen for her left thumb. She saw the PA last week for her left thumb/hand and received an injection. She stated she would like to see Dr. Cohen. The injection did not help and she is still in pain. She would like to be seen this week since they are going out of town for [facility]. If you can please contact her at [phone]. Thank you [facility] to [facility] [place]

## 2025-03-03 ENCOUNTER — TELEPHONE (OUTPATIENT)
Dept: ORTHOPEDICS | Facility: CLINIC | Age: 50
End: 2025-03-03
Payer: COMMERCIAL

## 2025-03-03 NOTE — TELEPHONE ENCOUNTER
Called Nan Mendez to schedule an appointment.  I was unable to reach the patient, I left patient a voicemail to return my call.      Mario Leung MS, OTC   Sports Medicine Assistant   [facility]  (P) [phone]  (F) [phone]

## 2025-03-06 ENCOUNTER — OFFICE VISIT (OUTPATIENT)
Dept: OBSTETRICS AND GYNECOLOGY | Facility: CLINIC | Age: 50
End: 2025-03-06
Payer: COMMERCIAL

## 2025-03-06 VITALS
DIASTOLIC BLOOD PRESSURE: 66 MMHG | BODY MASS INDEX: 25.32 KG/M2 | WEIGHT: 142.88 LBS | HEIGHT: 63 IN | SYSTOLIC BLOOD PRESSURE: 104 MMHG

## 2025-03-06 DIAGNOSIS — Z01.411 ENCOUNTER FOR GYNECOLOGICAL EXAMINATION WITH ABNORMAL FINDING: Primary | ICD-10-CM

## 2025-03-06 DIAGNOSIS — N90.4 LICHEN SCLEROSUS OF VULVA: ICD-10-CM

## 2025-03-06 DIAGNOSIS — Z12.31 ENCOUNTER FOR SCREENING MAMMOGRAM FOR MALIGNANT NEOPLASM OF BREAST: ICD-10-CM

## 2025-03-06 PROCEDURE — 99396 PREV VISIT EST AGE 40-64: CPT | Mod: S$GLB,,, | Performed by: OBSTETRICS & GYNECOLOGY

## 2025-03-06 PROCEDURE — 1159F MED LIST DOCD IN RCRD: CPT | Mod: CPTII,S$GLB,, | Performed by: OBSTETRICS & GYNECOLOGY

## 2025-03-06 PROCEDURE — 3078F DIAST BP <80 MM HG: CPT | Mod: CPTII,S$GLB,, | Performed by: OBSTETRICS & GYNECOLOGY

## 2025-03-06 PROCEDURE — 99999 PR PBB SHADOW E&M-EST. PATIENT-LVL III: CPT | Mod: PBBFAC,,, | Performed by: OBSTETRICS & GYNECOLOGY

## 2025-03-06 PROCEDURE — 3008F BODY MASS INDEX DOCD: CPT | Mod: CPTII,S$GLB,, | Performed by: OBSTETRICS & GYNECOLOGY

## 2025-03-06 PROCEDURE — 3074F SYST BP LT 130 MM HG: CPT | Mod: CPTII,S$GLB,, | Performed by: OBSTETRICS & GYNECOLOGY

## 2025-03-06 RX ORDER — CLOBETASOL PROPIONATE 0.5 MG/G
OINTMENT TOPICAL 2 TIMES DAILY
Qty: 30 G | Refills: 1 | Status: SHIPPED | OUTPATIENT
Start: 2025-03-06

## 2025-03-06 NOTE — PROGRESS NOTES
"Well woman exam    Nan Mendez is a 49 y.o.   patient who presents for a routine gyn exam/well woman exam.  LMP: Patient's last menstrual period was 2022.  Patient has questions regarding estrogen replacement therapy.  No menopausal symptoms reported.  No gynecologic issues, problems, or complaints.      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;  Service: OB/GYN;  Laterality: N/A;    ROBOT-ASSISTED LAPAROSCOPIC LYSIS OF ADHESIONS USING DA ALEXI XI N/A 3/14/2022    Procedure: XI ROBOTIC LYSIS, ADHESIONS;  Surgeon: Les Macdonald IV, MD;  Location: [facility];  Service: OB/GYN;  Laterality: N/A;     Social History[1]  Family History   Problem Relation Name Age of Onset    Stroke Mother      Cataracts Mother      Colon cancer Mother      Stroke Father      Diabetes Maternal Grandmother      Cataracts Maternal Grandmother      Breast cancer Neg Hx      Ovarian cancer Neg Hx      Amblyopia Neg Hx      Blindness Neg Hx      Cancer Neg Hx      Glaucoma Neg Hx      Hypertension Neg Hx      Macular degeneration Neg Hx      Retinal detachment Neg Hx      Strabismus Neg Hx      Thyroid disease Neg Hx       OB History          2    Para   2    Term           2    AB        Living   2         SAB        IAB        Ectopic        Multiple        Live Births                     /66 (BP Location: Left arm, Patient Position: Sitting)   Ht 5' 3" " (1.6 m)   Wt 64.8 kg (142 lb 13.7 oz)   LMP 02/21/2022   BMI 25.31 kg/m²       ROS:  GENERAL: Denies weight gain or weight loss. Feeling well overall.   SKIN: Denies rash or lesions.   HEAD: Denies head injury or headache.   NODES: Denies enlarged lymph nodes.   CHEST: Denies chest pain or shortness of breath.   CARDIOVASCULAR: Denies palpitations or left sided chest pain.   ABDOMEN: No abdominal pain, constipation, diarrhea, nausea, vomiting or rectal bleeding.   URINARY: No frequency, dysuria, hematuria, or burning on urination.  REPRODUCTIVE: See HPI.   BREASTS: The patient performs breast self-examination and denies pain, lumps, or nipple discharge.   HEMATOLOGIC: No easy bruisability or excessive bleeding.   MUSCULOSKELETAL: Denies joint pain or swelling.   NEUROLOGIC: Denies syncope or weakness.   PSYCHIATRIC: Denies depression, anxiety or mood swings.    PHYSICAL EXAM:  APPEARANCE: Well nourished, well developed, in no acute distress.  AFFECT: WNL, alert and oriented x 3  SKIN: No acne or hirsutism  NECK: Neck symmetric without masses or thyromegaly  NODES: No inguinal, cervical, axillary, or femoral lymph node enlargement  CHEST: Good respiratory effect  ABDOMEN: Soft.  No tenderness or masses.  No hepatosplenomegaly.  No hernias.  Healed Pfannenstiel skin incision noted.  BREASTS: Symmetrical, no skin changes or visible lesions.  No palpable masses, nipple discharge bilaterally.  PELVIC:  External genitalia/labia minora hypochromic skin patches (consistent with lichen sclerosus).  Mild labia minora resorption noted.  Normal hair distribution.  Adequate perineal body, normal urethral meatus.  Vagina moist and well rugated without lesions or discharge.  Vaginal cuff with good support.  Cervix/uterus absent.  No significant cystocele or rectocele.  Bimanual exam reveals no adnexal mass or tenderness bilaterally.  EXTREMITIES: No edema.    Encounter for gynecological examination with abnormal  finding    Lichen sclerosus of vulva  -     clobetasol 0.05% (TEMOVATE) 0.05 % Oint; Apply topically 2 (two) times daily. Apply BID for 2 weeks, Once a day for 2 weeks, every other day 2 weeks.  Dispense: 30 g; Refill: 1    Encounter for screening mammogram for malignant neoplasm of breast  -     Mammo Digital Screening Bilat w/ Ignacio (XPD); Future; Expected date: 03/06/2025      Age specific counseling    Cancer screening recommendations reviewed with the patient today.  Bilateral screening mammogram ordered today.    Patient counseled on menopause/menopausal symptoms.  Patient also counseled on risk and benefits to ERT.  Patient has no significant symptoms and declines ERT today.  Patient instructed to monitor for symptoms/contact office if ERT desired.    Patient counseled on lichen sclerosus of vulva (likely diagnosis due to hypochromic skin patches and mild resorption of labia minora).  Clobetasol ointment prescription given today with instructions on use verbally given by me.    Patient was counseled today on A.C.S. Pap guidelines and recommendations for yearly pelvic exams, mammograms and monthly self breast exams; to see her PCP for other health maintenance.     Follow up in about 1 year (around 3/6/2026) for Annual exam.    Les Macdonald IV, MD                          [1]   Social History  Socioeconomic History    Marital status:    Tobacco Use    Smoking status: Never    Smokeless tobacco: Never   Substance and Sexual Activity    Alcohol use: Yes     Comment: social    Drug use: Never    Sexual activity: Yes     Partners: Male     Birth control/protection: See Surgical Hx, Partner-Vasectomy     Comment:  - Vas

## 2025-03-07 ENCOUNTER — TELEPHONE (OUTPATIENT)
Dept: ORTHOPEDICS | Facility: CLINIC | Age: 50
End: 2025-03-07
Payer: COMMERCIAL

## 2025-03-07 NOTE — TELEPHONE ENCOUNTER
1 year ago fell skiing on her left hand     unable to use left hand to pull up leggings, holding the ski pole, gripping/grasping.     No improvement     Scheduled patient for appt.     Mario Leung MS, OTC   Sports Medicine Assistant   [facility]  (P) [phone]  (F) [phone]

## 2025-03-14 ENCOUNTER — OFFICE VISIT (OUTPATIENT)
Dept: ORTHOPEDICS | Facility: CLINIC | Age: 50
End: 2025-03-14
Payer: COMMERCIAL

## 2025-03-14 ENCOUNTER — HOSPITAL ENCOUNTER (OUTPATIENT)
Dept: RADIOLOGY | Facility: HOSPITAL | Age: 50
Discharge: HOME | End: 2025-03-14
Attending: ORTHOPAEDIC SURGERY
Payer: COMMERCIAL

## 2025-03-14 DIAGNOSIS — M79.642 CHRONIC PAIN OF LEFT HAND: ICD-10-CM

## 2025-03-14 DIAGNOSIS — S63.042A: ICD-10-CM

## 2025-03-14 DIAGNOSIS — G89.29 CHRONIC PAIN OF LEFT HAND: ICD-10-CM

## 2025-03-14 DIAGNOSIS — M18.12 PRIMARY OSTEOARTHRITIS OF FIRST CARPOMETACARPAL JOINT OF LEFT HAND: ICD-10-CM

## 2025-03-14 DIAGNOSIS — M18.12 PRIMARY OSTEOARTHRITIS OF FIRST CARPOMETACARPAL JOINT OF LEFT HAND: Primary | ICD-10-CM

## 2025-03-14 DIAGNOSIS — Z01.818 PRE-OP TESTING: ICD-10-CM

## 2025-03-14 PROCEDURE — 1159F MED LIST DOCD IN RCRD: CPT | Mod: CPTII,S$GLB,, | Performed by: ORTHOPAEDIC SURGERY

## 2025-03-14 PROCEDURE — 99205 OFFICE O/P NEW HI 60 MIN: CPT | Mod: S$GLB,,, | Performed by: ORTHOPAEDIC SURGERY

## 2025-03-14 PROCEDURE — 99999 PR PBB SHADOW E&M-EST. PATIENT-LVL V: CPT | Mod: PBBFAC,,, | Performed by: ORTHOPAEDIC SURGERY

## 2025-03-14 PROCEDURE — 3044F HG A1C LEVEL LT 7.0%: CPT | Mod: CPTII,S$GLB,, | Performed by: ORTHOPAEDIC SURGERY

## 2025-03-14 PROCEDURE — 73130 X-RAY EXAM OF HAND: CPT | Mod: 26,LT,, | Performed by: RADIOLOGY

## 2025-03-14 PROCEDURE — 73130 X-RAY EXAM OF HAND: CPT | Mod: TC,LT

## 2025-03-14 PROCEDURE — 1160F RVW MEDS BY RX/DR IN RCRD: CPT | Mod: CPTII,S$GLB,, | Performed by: ORTHOPAEDIC SURGERY

## 2025-03-14 NOTE — PATIENT INSTRUCTIONS
[facility]  HAND CLINIC SURGERY INSTRUCTIONS  Isidra Cohen MD  Date of Surgery: 3/20/25    Location of Surgery:      [facility], [address] [place] [address]    PRE-OPERATIVE INSTRUCTIONS:    Dr. Cohen's clinic staff will call you THE DAY BEFORE SURGERY with your arrival time. You will be notified in the afternoon between 11:00AM - 5:00pm. We will attempt to provide your arrival time earlier and will call you if this is at all possible.   Arrival time 11/1130    DO NOT eat or drink after midnight, this includes gum/hard candy, coffee.   You may drink gatorade and water only up to 2 hours prior to arrival, NOTHING ELSE.    You ARE NOT to drive or take an Uber/Lyft/taxi home from surgery. Please arrange a friend/family member to accompany you at the surgery center and drive you home.  Transportation: mom    PLEASE REMOVE nail polish and/or artificial nails prior to your surgery date if applicable. NA [facility] PAC[facility] Visitor Policy:    Each patient will be allowed 2 visitors with 1 visitor at a time. Only 1 swap out allowed.   Pediatric patients: Both parents are allowed if present.   Post-Op: If there is more than 1 visitor, the person that is the main caregiver will need to be available for d/c instructions should visit 2nd, and stay with the patient until d/c.  All visitors must be accompanied in and out with an employee.       PRE-OPERATIVE MEDICATION INSTRUCTIONS:    If you are diabetic, DO NOT take any diabetic medication the night before surgery or morning of surgery. If you are type I diabetic on an insulin pump, please bring your monitor the morning of surgery.   NA    MUST HOLD SEMAGLUTIDE MEDICATIONS 7 DAYS PRIOR TO SURGERY, examples: Mounjaro, Ozempic, Trulicity.   NA    Please HOLD Vitamins 5 days prior to surgery or sooner, CONTINUE Vitamin D up until the AM of your surgery.    Avoid anti-inflammatory medications 5 days before your surgery. This  includes Aleve/Naproxen, Celebrex, Meloxicam/Mobic, Voltaren/Diclofenac.   You may dose ibuprofen/Advil/Motrin up until the day before your surgery.  You may take extra strength Tylenol/Acetaminophen.    HOLD ALL OTHER MEDICATIONS AM OF SURGERY THAT ARE NOT DISCUSSED ABOVE        POST-OPERATIVE MEDICATION INSTRUCTIONS:    Your post-operative pain medication will be DELIVERED BEDSIDE to you at the surgery center by the [facility]. You DO NOT need to pick this medication up from the [facility].     TAKE post-operative pain medication as directed.   You may also take over-the-counter extra strength Tylenol/acetaminophen as directed on the bottle for breakthrough pain between dosed of prescribed pain medication.   Please note, some pain medications contain Tylenol/acetaminophen.   DO NOT exceed 3000mg of Tylenol/acetaminophen in 1 day.  You may also use an over-the-counter anti-inflammatory medication also known as an NSAID,  (Aleve/Naproxen) as directed on the bottle for breakthrough pain between doses of prescribed pain medication.  DO NOT take NSAIDs if you have been previously instructed not to by a physician.     POST-OPERATIVE INSTRUCTIONS:    DO NOT let your operative area become wet, Your dressings/bandages/splints must remain dry.   Recommend waterproof arm cast cover to be worn when showering and bathing and can be purchased on Amazon. Garbage bags, plastic shopping bags, or umbrella bags can also be used instead.    DO NOT remove any post-operative dressings/bandages/splints until you are seen by Farheen Doll PA-C, Dr. Cohen or Occupational Therapy   These dressings/bandages/splints are in place to keep the operative site clean, dry, and in optimal healing position     ELEVATE your hand/arm above the level of your heart as much as possible to decrease post-operative swelling for first 48 hours.  Swelling after surgery is TO BE EXPECTED.      ICE the operative site following surgery for 20-30  minutes, 4-5 times per day.  Be sure to have a towel between your dressings/bandages/splint to keep from getting wet.    MOVE the parts of your hand/arm that are not immobilized in a sling or splint.   Make a gentle fist with your fingers, bend/straighten your elbow, etc.   DO NOT attempt any strengthening exercises (hand putty, exercise balls, etc) on your operative side as this can increase swelling.     *CALL the [facility] at [phone]*  If at any time following surgery your dressings/bandages/splints: get wet, come loose, feels tight, feels uncomfortable.  Or if you are concerned about possible infection, worsening pain, worsening swelling or have any other concerns regarding your surgery.   Signs of infection:  fever (over 100.3), chills, body aches, increased warmth, redness, significant increase in swelling & pain at surgical site.     OUTPATIENT FOLLOW UP:  YOU WILL BE SEEN FIRST BY OCCUPATIONAL THERAPY 7-10 DAYS AFTER YOUR SURGERY. *Your splint / soft dressing will be removed as well as your sutures if applicable.  YOU WILL THEN BE SEEN BY NEREYDA VARGHESE PA-C IN CLINIC 2 weeks AFTER SURGERY AND DR. GARCIA IN CLINIC 6 WEEKS AFTER SURGERY    Patient IQ (Survey)  A quick questionnaire (2-3 minutes) will be sent to you via text message or email in the next few hours. It will only take a few minutes to complete!  We care about your health, answering the questionnaire allows us to track your progress through your recovery to provide the best outcome for your recovery.

## 2025-03-14 NOTE — H&P (VIEW-ONLY)
[facility]  History & Physical  Orthopedics    SUBJECTIVE:      Chief Complaint:   Chief Complaint   Patient presents with    Left Hand - Pain       Referring Provider: None    Patient is a 49 y.o. right hand dominant female who presents today with complaints of left thumb.  History of Present Illness    CHIEF COMPLAINT:  - Left thumb pain and dysfunction.    HPI:  Patient presents with severe pain and limited function in her left thumb. The issue began approximately one year ago during a skiing accident at [employer], where she fell and landed on her left hand with a ski pole. Initially, she thought she had just bruised it and the pain subsided. However, in 2025, the pain suddenly flared up.    She describes the pain as pulling and notes visible swelling in the thumb area. Pain severity is significant, with the patient reporting extreme discomfort. She reports significant functional limitations, stating she is unable to use her hand and has difficulty with daily activities such as taking laundry out of the dryer, washing dishes, and texting.    An x-ray was done in April of last year, which showed mild arthritis. Patient has a history of carpal tunnel syndrome in both hands, which was surgically treated approximately 20 years ago by Dr. Saldana. She denies any current issues related to the carpal tunnel surgeries.    PREVIOUS TREATMENTS:  - Patient tried braces  - Patient received a corticosteroid injection    Vitals:    25 0945   PainSc:   8   PainLoc: Hand     The patient denies any fevers, chills, N/V, D/C and presents for evaluation.    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    Hypertension     Infertility, female      Past Surgical History:   Procedure Laterality Date    ABDOMINAL SURGERY      diag lap    CARPAL TUNNEL RELEASE Bilateral      &      SECTION  2004      SECTION  06/11/2011    ROBOT-ASSISTED LAPAROSCOPIC ABDOMINAL HYSTERECTOMY USING DA ALEXI XI N/A 3/14/2022    Procedure: XI ROBOTIC HYSTERECTOMY, BILATERAL SALPHINGECTOMY AND RESECTION OF ENDOMETRIOSIS;  Surgeon: Les Macdonald IV, MD;  Location: [facility] OR;  Service: OB/GYN;  Laterality: N/A;    ROBOT-ASSISTED LAPAROSCOPIC LYSIS OF ADHESIONS USING DA ALEXI XI N/A 3/14/2022    Procedure: XI ROBOTIC LYSIS, ADHESIONS;  Surgeon: Les Macdonald IV, MD;  Location: [facility] OR;  Service: OB/GYN;  Laterality: N/A;     Review of patient's allergies indicates:  No Known Allergies  Social History     Social History Narrative    Not on file     Family History   Problem Relation Name Age of Onset    Stroke Mother      Cataracts Mother      Colon cancer Mother      Stroke Father      Diabetes Maternal Grandmother      Cataracts Maternal Grandmother      Breast cancer Neg Hx      Ovarian cancer Neg Hx      Amblyopia Neg Hx      Blindness Neg Hx      Cancer Neg Hx      Glaucoma Neg Hx      Hypertension Neg Hx      Macular degeneration Neg Hx      Retinal detachment Neg Hx      Strabismus Neg Hx      Thyroid disease Neg Hx         Current Medications[1]    ROS    Review of Systems:  Constitutional: no fever or chills  Eyes: no visual changes  ENT: no nasal congestion or sore throat  Respiratory: no cough or shortness of breath  Cardiovascular: no chest pain  Gastrointestinal: no nausea or vomiting, tolerating diet  Musculoskeletal: pain, soreness, and decreased ROM    OBJECTIVE:      Vital Signs (Most Recent):  There were no vitals filed for this visit.  There is no height or weight on file to calculate BMI.    Physical Exam    Physical Exam:  Constitutional: The patient appears well-developed and well-nourished. No distress.   Head: Normocephalic and atraumatic.   Nose: Nose normal.   Eyes: Conjunctivae and EOM are normal.   Neck: No tracheal deviation present.   Cardiovascular: Normal rate and intact distal pulses.     Pulmonary/Chest: Effort normal. No respiratory distress.   Abdominal: There is no guarding.   Lymphatic: Negative for adenopathy   Neurological: The patient is alert.   Psychiatric: The patient has a normal mood and affect.     Bilateral Hand/Wrist Examination:  Observation/Inspection:  Swelling  none    Deformity  none  Discoloration  none     Scars   none    Atrophy  none    HAND/WRIST EXAMINATION:  LUE: Positive grind test with telescoping pain left basilar thumb joint, decreased left thumb CMC ROM, 5/5 thenar and intrinsic musculature strength,  otherwise bilateral full range of motion hands, wrists and elbows.    Neurovascular Exam:  Digits WWP, brisk CR < 3s throughout  NVI motor/LTS to M/R/U nerves, radial pulse 2+  2+ biceps and brachioradialis reflexes    Diagnostic studies and other clinical records review:  X-rays AP, lateral and oblique left hand taken today are independently reviewed by me and shows Eaton stage III basilar thumb arthritis and subluxation.     ASSESSMENT/PLAN:    49 y.o. yo female with   Encounter Diagnoses   Name Primary?    Primary osteoarthritis of first carpometacarpal joint of left hand Yes    Pre-op testing     Subluxation of carpometacarpal joint of left thumb, initial encounter     Chronic pain of left hand      Plan: The patient and I had a thorough discussion today. We discussed the working diagnosis as well as several other potential alternative diagnoses. Treatment options were discussed, both conservative and surgical. Conservative treatment options would include things such as activity modifications, workplace modifications, a period of rest, oral vs topical OTC and prescription anti-inflammatory medications, occupational therapy, splinting/bracing, immobilization, corticosteroid injections, and others. Surgical options were discussed as well.     At this time, the patient has exhausted conservative measures and would like to proceed with surgery. Surgery would include  left thumb cmc arthroplasty. Consent signed today in clinic. Light use of the hand will be indicated for the first 4-6 weeks     We have discussed risks, benefits and alternatives of hand surgery which include but are not limited to blood clots in the legs that can travel to the lungs (pulmonary embolism). Pulmonary embolism can cause shortness of breath, chest pain, and even shock. Other risks include urinary tract infection, nausea and vomiting (usually related to pain medication), chronic pain, bleeding, nerve damage, blood vessel injury, scarring and infection of the hand which can require re-operation. Furthermore, the risks of anesthesia include potential heart, lung, kidney, and liver damage.  Informed consent was obtained.  She understands and would like to proceed with surgery in the near future.    Call with any questions in the interim.    The patient's pathophysiology was explained in detail with reference to x-rays, models, other visual aids as appropriate.  Treatment options were discussed in detail.  Questions were invited and answered to the patient's satisfaction. I reviewed Primary care , and other specialty's notes to better coordinate patient's care.          Isidra Cohen MD    Please be aware that this note has been generated with the assistance of [employer] voice-to-text.  Please excuse any spelling or grammatical errors.  This note was generated with the assistance of ambient listening technology. Verbal consent was obtained by the patient and accompanying visitor(s) for the recording of patient appointment to facilitate this note. I attest to having reviewed and edited the generated note for accuracy, though some syntax or spelling errors may persist. Please contact the author of this note for any clarification.           [1]   Current Outpatient Medications:     amLODIPine-valsartan-hcthiazid 5-160-25 mg Tab, TAKE 1 TABLET BY MOUTH EVERY DAY, Disp: 90 tablet, Rfl: 3    clobetasol 0.05%  (TEMOVATE) 0.05 % Oint, Apply topically 2 (two) times daily. Apply BID for 2 weeks, Once a day for 2 weeks, every other day 2 weeks., Disp: 30 g, Rfl: 1    diclofenac sodium (VOLTAREN) 1 % Gel, Apply 2 g topically 4 (four) times daily., Disp: 100 g, Rfl: 2    docusate sodium (COLACE) 100 MG capsule, Take 1 capsule (100 mg total) by mouth 2 (two) times daily., Disp: 30 capsule, Rfl: 1    HYDROcodone-acetaminophen (NORCO) 5-325 mg per tablet, Take 1 tablet by mouth every 6 (six) hours as needed for Pain., Disp: 30 tablet, Rfl: 0    multivitamin-minerals-lutein (MULTIVITAMIN 50 PLUS) Tab, Take 1 tablet by mouth once daily., Disp: , Rfl:     omega-3 fatty acids/fish oil (FISH OIL-OMEGA-3 FATTY ACIDS) 300-1,000 mg capsule, Take 2 g by mouth., Disp: , Rfl:     ondansetron (ZOFRAN) 8 MG tablet, Take 1 tablet (8 mg total) by mouth every 8 (eight) hours as needed for Nausea., Disp: 30 tablet, Rfl: 0    thyroid, pork, (ARMOUR THYROID) 60 mg Tab, Take 1 tablet (60 mg total) by mouth once daily., Disp: 90 tablet, Rfl: 3

## 2025-03-14 NOTE — PROGRESS NOTES
[facility]  History & Physical  Orthopedics    SUBJECTIVE:      Chief Complaint:   Chief Complaint   Patient presents with    Left Hand - Pain       Referring Provider: None    Patient is a 49 y.o. right hand dominant female who presents today with complaints of left thumb.  History of Present Illness    CHIEF COMPLAINT:  - Left thumb pain and dysfunction.    HPI:  Patient presents with severe pain and limited function in her left thumb. The issue began approximately one year ago during a skiing accident at [employer], where she fell and landed on her left hand with a ski pole. Initially, she thought she had just bruised it and the pain subsided. However, in 2025, the pain suddenly flared up.    She describes the pain as pulling and notes visible swelling in the thumb area. Pain severity is significant, with the patient reporting extreme discomfort. She reports significant functional limitations, stating she is unable to use her hand and has difficulty with daily activities such as taking laundry out of the dryer, washing dishes, and texting.    An x-ray was done in April of last year, which showed mild arthritis. Patient has a history of carpal tunnel syndrome in both hands, which was surgically treated approximately 20 years ago by Dr. Saldana. She denies any current issues related to the carpal tunnel surgeries.    PREVIOUS TREATMENTS:  - Patient tried braces  - Patient received a corticosteroid injection    Vitals:    25 0945   PainSc:   8   PainLoc: Hand     The patient denies any fevers, chills, N/V, D/C and presents for evaluation.    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 OR;  Service: OB/GYN;  Laterality: N/A;    ROBOT-ASSISTED LAPAROSCOPIC LYSIS OF ADHESIONS USING DA ALEXI XI N/A 3/14/2022    Procedure: XI ROBOTIC LYSIS, ADHESIONS;  Surgeon: Les Macdonald IV, MD;  Location: [facility] OR;  Service: OB/GYN;  Laterality: N/A;     Review of patient's allergies indicates:  No Known Allergies  Social History     Social History Narrative    Not on file     Family History   Problem Relation Name Age of Onset    Stroke Mother      Cataracts Mother      Colon cancer Mother      Stroke Father      Diabetes Maternal Grandmother      Cataracts Maternal Grandmother      Breast cancer Neg Hx      Ovarian cancer Neg Hx      Amblyopia Neg Hx      Blindness Neg Hx      Cancer Neg Hx      Glaucoma Neg Hx      Hypertension Neg Hx      Macular degeneration Neg Hx      Retinal detachment Neg Hx      Strabismus Neg Hx      Thyroid disease Neg Hx         Current Medications[1]    ROS    Review of Systems:  Constitutional: no fever or chills  Eyes: no visual changes  ENT: no nasal congestion or sore throat  Respiratory: no cough or shortness of breath  Cardiovascular: no chest pain  Gastrointestinal: no nausea or vomiting, tolerating diet  Musculoskeletal: pain, soreness, and decreased ROM    OBJECTIVE:      Vital Signs (Most Recent):  There were no vitals filed for this visit.  There is no height or weight on file to calculate BMI.    Physical Exam    Physical Exam:  Constitutional: The patient appears well-developed and well-nourished. No distress.   Head: Normocephalic and atraumatic.   Nose: Nose normal.   Eyes: Conjunctivae and EOM are normal.   Neck: No tracheal deviation present.   Cardiovascular: Normal rate and intact distal pulses.     Pulmonary/Chest: Effort normal. No respiratory distress.   Abdominal: There is no guarding.   Lymphatic: Negative for adenopathy   Neurological: The patient is alert.   Psychiatric: The patient has a normal mood and affect.     Bilateral Hand/Wrist Examination:  Observation/Inspection:  Swelling  none    Deformity  none  Discoloration  none     Scars   none    Atrophy  none    HAND/WRIST EXAMINATION:  LUE: Positive grind test with telescoping pain left basilar thumb joint, decreased left thumb CMC ROM, 5/5 thenar and intrinsic musculature strength,  otherwise bilateral full range of motion hands, wrists and elbows.    Neurovascular Exam:  Digits WWP, brisk CR < 3s throughout  NVI motor/LTS to M/R/U nerves, radial pulse 2+  2+ biceps and brachioradialis reflexes    Diagnostic studies and other clinical records review:  X-rays AP, lateral and oblique left hand taken today are independently reviewed by me and shows Eaton stage III basilar thumb arthritis and subluxation.     ASSESSMENT/PLAN:    49 y.o. yo female with   Encounter Diagnoses   Name Primary?    Primary osteoarthritis of first carpometacarpal joint of left hand Yes    Pre-op testing     Subluxation of carpometacarpal joint of left thumb, initial encounter     Chronic pain of left hand      Plan: The patient and I had a thorough discussion today. We discussed the working diagnosis as well as several other potential alternative diagnoses. Treatment options were discussed, both conservative and surgical. Conservative treatment options would include things such as activity modifications, workplace modifications, a period of rest, oral vs topical OTC and prescription anti-inflammatory medications, occupational therapy, splinting/bracing, immobilization, corticosteroid injections, and others. Surgical options were discussed as well.     At this time, the patient has exhausted conservative measures and would like to proceed with surgery. Surgery would include  left thumb cmc arthroplasty. Consent signed today in clinic. Light use of the hand will be indicated for the first 4-6 weeks     We have discussed risks, benefits and alternatives of hand surgery which include but are not limited to blood clots in the legs that can travel to the lungs (pulmonary embolism). Pulmonary embolism can cause shortness of breath, chest pain, and even shock. Other risks include urinary tract infection, nausea and vomiting (usually related to pain medication), chronic pain, bleeding, nerve damage, blood vessel injury, scarring and infection of the hand which can require re-operation. Furthermore, the risks of anesthesia include potential heart, lung, kidney, and liver damage.  Informed consent was obtained.  She understands and would like to proceed with surgery in the near future.    Call with any questions in the interim.    The patient's pathophysiology was explained in detail with reference to x-rays, models, other visual aids as appropriate.  Treatment options were discussed in detail.  Questions were invited and answered to the patient's satisfaction. I reviewed Primary care , and other specialty's notes to better coordinate patient's care.          Isidra Cohen MD    Please be aware that this note has been generated with the assistance of [employer] voice-to-text.  Please excuse any spelling or grammatical errors.  This note was generated with the assistance of ambient listening technology. Verbal consent was obtained by the patient and accompanying visitor(s) for the recording of patient appointment to facilitate this note. I attest to having reviewed and edited the generated note for accuracy, though some syntax or spelling errors may persist. Please contact the author of this note for any clarification.           [1]   Current Outpatient Medications:     amLODIPine-valsartan-hcthiazid 5-160-25 mg Tab, TAKE 1 TABLET BY MOUTH EVERY DAY, Disp: 90 tablet, Rfl: 3    clobetasol 0.05%  (TEMOVATE) 0.05 % Oint, Apply topically 2 (two) times daily. Apply BID for 2 weeks, Once a day for 2 weeks, every other day 2 weeks., Disp: 30 g, Rfl: 1    diclofenac sodium (VOLTAREN) 1 % Gel, Apply 2 g topically 4 (four) times daily., Disp: 100 g, Rfl: 2    docusate sodium (COLACE) 100 MG capsule, Take 1 capsule (100 mg total) by mouth 2 (two) times daily., Disp: 30 capsule, Rfl: 1    HYDROcodone-acetaminophen (NORCO) 5-325 mg per tablet, Take 1 tablet by mouth every 6 (six) hours as needed for Pain., Disp: 30 tablet, Rfl: 0    multivitamin-minerals-lutein (MULTIVITAMIN 50 PLUS) Tab, Take 1 tablet by mouth once daily., Disp: , Rfl:     omega-3 fatty acids/fish oil (FISH OIL-OMEGA-3 FATTY ACIDS) 300-1,000 mg capsule, Take 2 g by mouth., Disp: , Rfl:     ondansetron (ZOFRAN) 8 MG tablet, Take 1 tablet (8 mg total) by mouth every 8 (eight) hours as needed for Nausea., Disp: 30 tablet, Rfl: 0    thyroid, pork, (ARMOUR THYROID) 60 mg Tab, Take 1 tablet (60 mg total) by mouth once daily., Disp: 90 tablet, Rfl: 3

## 2025-03-15 DIAGNOSIS — I10 PRIMARY HYPERTENSION: ICD-10-CM

## 2025-03-17 ENCOUNTER — PATIENT MESSAGE (OUTPATIENT)
Dept: PREADMISSION TESTING | Facility: HOSPITAL | Age: 50
End: 2025-03-17
Payer: COMMERCIAL

## 2025-03-17 ENCOUNTER — PATIENT MESSAGE (OUTPATIENT)
Dept: ORTHOPEDICS | Facility: CLINIC | Age: 50
End: 2025-03-17
Payer: COMMERCIAL

## 2025-03-17 ENCOUNTER — HOSPITAL ENCOUNTER (OUTPATIENT)
Dept: CARDIOLOGY | Facility: CLINIC | Age: 50
Discharge: HOME | End: 2025-03-17
Payer: COMMERCIAL

## 2025-03-17 DIAGNOSIS — Z01.818 PRE-OP TESTING: ICD-10-CM

## 2025-03-17 LAB
OHS QRS DURATION: 98 MS
OHS QTC CALCULATION: 438 MS

## 2025-03-17 PROCEDURE — 93010 ELECTROCARDIOGRAM REPORT: CPT | Mod: S$GLB,,, | Performed by: INTERNAL MEDICINE

## 2025-03-17 PROCEDURE — 93005 ELECTROCARDIOGRAM TRACING: CPT | Mod: S$GLB,,, | Performed by: ORTHOPAEDIC SURGERY

## 2025-03-17 RX ORDER — AMLODIPINE, VALSARTAN AND HYDROCHLOROTHIAZIDE 5; 160; 25 MG/1; MG/1; MG/1
1 TABLET ORAL
Qty: 90 TABLET | Refills: 3 | Status: SHIPPED | OUTPATIENT
Start: 2025-03-17

## 2025-03-17 NOTE — ANESTHESIA PAT ROS NOTE
2025  Nan Mendez is a 49 y.o., female.      Pre-op Assessment    I have reviewed the Patient Summary Reports.       I have reviewed the Medications.     Review of Systems  Anesthesia Hx:  No problems with previous Anesthesia   History of prior surgery of interest to airway management or planning:  Previous anesthesia: MAC, General Hysterectomy 3/14/22 with general anesthesia.  Procedure performed at an [facility].      Colonoscopy 22 with MAC.  Airway issues documented on chart review include mask, easy, GETA, videolaryngoscope used  , view on video-laryngoscopy Grade I       Social:  Alcohol Use, Non-Smoker       Hematology/Oncology:                        --  Cancer in past history:       Other (see Oncology comments)       chemotherapy   Oncology Comments: Hx Hodgkin's lymphoma.  Gets yearly checkups at [facility].     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    Hypertension     Infertility, female      Past Surgical History:   Procedure Laterality Date    ABDOMINAL SURGERY      diag lap    CARPAL TUNNEL RELEASE Bilateral      &      SECTION  2004     SECTION  2011    ROBOT-ASSISTED LAPAROSCOPIC ABDOMINAL HYSTERECTOMY USING DA ALEXI XI N/A 3/14/2022    Procedure: XI ROBOTIC HYSTERECTOMY, BILATERAL SALPHINGECTOMY AND RESECTION OF ENDOMETRIOSIS;  Surgeon: Les Macdonald IV, MD;  Location: [facility];  Service: OB/GYN;  Laterality: N/A;    ROBOT-ASSISTED LAPAROSCOPIC LYSIS OF ADHESIONS USING DA ALEXI XI N/A 3/14/2022    Procedure: XI ROBOTIC LYSIS, ADHESIONS;   Surgeon: Les Macdonald IV, MD;  Location: [facility];  Service: OB/GYN;  Laterality: N/A;     Anesthesia Assessment: Preoperative EQUATION    Planned Procedure: Procedure(s) (LRB):  INTERPOSITION ARTHROPLASTY, CMC JOINT THUMB (Left)  Requested Anesthesia Type:Regional  Surgeon: Isidra Cohen MD  Service: Orthopedics  Known or anticipated Date of Surgery:3/20/2025    Surgeon notes: reviewed    Electronic QUestionnaire Assessment completed via nurse interview with patient.      Triage considerations:     The patient has no apparent active cardiac condition (No unstable coronary Syndrome such as severe unstable angina or recent [<1 month] myocardial infarction, decompensated CHF, severe valvular   disease or significant arrhythmia)    Previous anesthesia records:GETA, MAC, and No problems    Last PCP note: 3-6 months ago   Subspecialty notes: Dermatology, Hematology/Oncology, Ortho    Other important co-morbidities: HTN and Hypothyroid   EKG 3/14/25  Vent. Rate :  72 BPM     Atrial Rate :  72 BPM      P-R Int : 184 ms          QRS Dur :  98 ms       QT Int : 400 ms       P-R-T Axes :  70  72  63 degrees     QTcB Int : 438 ms     Normal sinus rhythm   Normal ECG   No previous ECGs available   Confirmed by Anu Najera ([id number]) on 3/17/2025 4:37:22 PM     TTE 3/7/23  The estimated ejection fraction is 65%.  The quantitatively derived ejection fraction is 63%.  The left ventricular global longitudinal strain is -20%.  The left ventricle is normal in size with concentric remodeling and normal systolic function.  Normal left ventricular diastolic function.  Normal right ventricular size with normal right ventricular systolic function.  The estimated PA systolic pressure is 23 mmHg.  Normal central venous pressure (3 mmHg).     Tests already available:  Results have been reviewed.             Instructions given. (See in Nurse's note) Pre op medication instructions sent via portal message.    Optimization:  Anesthesia  "Preop Clinic Assessment not Indicated    Medical Opinion Indicated: no       Sub-specialist consult indicated: no       Plan:   No pre op medical clearance requested.    Navigation:  Straight Line to surgery.               No tests, anesthesia preop clinic visit, or consult required.    Ht: 5'3"  Wt: 64.8 kg (142 lb)  BMI: 25.31  "

## 2025-03-18 DIAGNOSIS — M18.12 PRIMARY OSTEOARTHRITIS OF FIRST CARPOMETACARPAL JOINT OF LEFT HAND: Primary | ICD-10-CM

## 2025-03-18 RX ORDER — ONDANSETRON HYDROCHLORIDE 8 MG/1
8 TABLET, FILM COATED ORAL EVERY 8 HOURS PRN
Qty: 30 TABLET | Refills: 0 | Status: SHIPPED | OUTPATIENT
Start: 2025-03-18

## 2025-03-18 RX ORDER — DOCUSATE SODIUM 100 MG/1
100 CAPSULE, LIQUID FILLED ORAL 2 TIMES DAILY
Qty: 30 CAPSULE | Refills: 1 | Status: SHIPPED | OUTPATIENT
Start: 2025-03-18

## 2025-03-18 RX ORDER — HYDROCODONE BITARTRATE AND ACETAMINOPHEN 5; 325 MG/1; MG/1
1 TABLET ORAL EVERY 6 HOURS PRN
Qty: 30 TABLET | Refills: 0 | Status: SHIPPED | OUTPATIENT
Start: 2025-03-18

## 2025-03-19 ENCOUNTER — ANESTHESIA EVENT (OUTPATIENT)
Dept: SURGERY | Facility: HOSPITAL | Age: 50
End: 2025-03-19
Payer: COMMERCIAL

## 2025-03-19 ENCOUNTER — TELEPHONE (OUTPATIENT)
Dept: DERMATOLOGY | Facility: CLINIC | Age: 50
End: 2025-03-19
Payer: COMMERCIAL

## 2025-03-19 ENCOUNTER — TELEPHONE (OUTPATIENT)
Dept: ORTHOPEDICS | Facility: CLINIC | Age: 50
End: 2025-03-19
Payer: COMMERCIAL

## 2025-03-19 NOTE — TELEPHONE ENCOUNTER
Spoke to patient to inform them of their surgery arrival time (8 am), LIANET, [address] [facility]:  Verbalized understanding of instructions for pre-wash, and reviewed NPO after midnight.   You may drink gatorade and water only up to 2 hours prior to arrival, NOTHING ELSE.  Confirmed call in regards to medication instructions from anesthesia.   confirmed  Confirmed patient was NOT using a rideshare service for surgery arrival or  transportation.  mom   Discussed removing any finger nail polish if applicable   none  Confirmed no new wounds or abrasions on operative extremity.  none of [facility] Pre-Op and PACU Visiting Policy  · Each patient will be allowed 2 visitors with 1 visitor at a time. Only 1 swap out allowed.  · Pediatric patients: Both parents are allowed if present.  · Post-Op: If there is more than 1 visitor, the person that is the main caregiver will need to be available for d/c instructions should visit 2nd, and stay with the patient until d/c.  All visitors must be accompanied in and out with an employee.

## 2025-03-19 NOTE — TELEPHONE ENCOUNTER
Spoke to pt. Pt verbally agreed and confirmed date and time given. Pt thanked me.     ----- Message from Med Assistant Varsha sent at 3/19/2025 11:18 AM CDT -----  Regarding: Dermatology Appt  Good morning,I spoke with Nan who is Tera Mendez's( [facility] [employer] [facility]) wife in regards to her needing to be seen in Dermatology. She is requesting to be see by Dr. Dahl. Nan is an established patient but does not want to see Dr. Mujica again. She noticed a raised mole on the side of her neck that is very new that she is concerned about and would like to get it checked. Also, she has a small bump on her nose that she has had for a while but would like to get it checked. If you can please contact her to schedule at [phone].Thank you Varsha Supervisor [facility] Access to [facility]

## 2025-03-20 ENCOUNTER — ANESTHESIA (OUTPATIENT)
Dept: SURGERY | Facility: HOSPITAL | Age: 50
End: 2025-03-20
Payer: COMMERCIAL

## 2025-03-20 ENCOUNTER — HOSPITAL ENCOUNTER (OUTPATIENT)
Facility: HOSPITAL | Age: 50
Discharge: HOME | End: 2025-03-20
Attending: ORTHOPAEDIC SURGERY | Admitting: ORTHOPAEDIC SURGERY
Payer: COMMERCIAL

## 2025-03-20 VITALS
HEIGHT: 63 IN | DIASTOLIC BLOOD PRESSURE: 68 MMHG | BODY MASS INDEX: 25.16 KG/M2 | RESPIRATION RATE: 30 BRPM | HEART RATE: 66 BPM | SYSTOLIC BLOOD PRESSURE: 114 MMHG | OXYGEN SATURATION: 99 % | WEIGHT: 142 LBS | TEMPERATURE: 98 F

## 2025-03-20 DIAGNOSIS — M18.12 PRIMARY OSTEOARTHRITIS OF FIRST CARPOMETACARPAL JOINT OF LEFT HAND: Primary | ICD-10-CM

## 2025-03-20 PROCEDURE — D9220A PRA ANESTHESIA: Mod: ANES,,, | Performed by: ANESTHESIOLOGY

## 2025-03-20 PROCEDURE — 71000039 HC RECOVERY, EACH ADD'L HOUR: Performed by: ORTHOPAEDIC SURGERY

## 2025-03-20 PROCEDURE — 27201423 OPTIME MED/SURG SUP & DEVICES STERILE SUPPLY: Performed by: ORTHOPAEDIC SURGERY

## 2025-03-20 PROCEDURE — 71000033 HC RECOVERY, INTIAL HOUR: Performed by: ORTHOPAEDIC SURGERY

## 2025-03-20 PROCEDURE — 71000015 HC POSTOP RECOV 1ST HR: Performed by: ORTHOPAEDIC SURGERY

## 2025-03-20 PROCEDURE — D9220A PRA ANESTHESIA: Mod: CRNA,,, | Performed by: NURSE ANESTHETIST, CERTIFIED REGISTERED

## 2025-03-20 PROCEDURE — 63600175 PHARM REV CODE 636 W HCPCS: Performed by: ANESTHESIOLOGY

## 2025-03-20 PROCEDURE — 25000003 PHARM REV CODE 250: Performed by: NURSE ANESTHETIST, CERTIFIED REGISTERED

## 2025-03-20 PROCEDURE — 25000003 PHARM REV CODE 250

## 2025-03-20 PROCEDURE — 64415 NJX AA&/STRD BRCH PLXS IMG: CPT | Performed by: ANESTHESIOLOGY

## 2025-03-20 PROCEDURE — C1713 ANCHOR/SCREW BN/BN,TIS/BN: HCPCS | Performed by: ORTHOPAEDIC SURGERY

## 2025-03-20 PROCEDURE — 25448 ARTHRP NTRCRPL/CRP/MTCRP SSP: CPT | Mod: LT,,, | Performed by: ORTHOPAEDIC SURGERY

## 2025-03-20 PROCEDURE — 63600175 PHARM REV CODE 636 W HCPCS: Performed by: PHYSICIAN ASSISTANT

## 2025-03-20 PROCEDURE — 88307 TISSUE EXAM BY PATHOLOGIST: CPT | Mod: 26,,, | Performed by: PATHOLOGY

## 2025-03-20 PROCEDURE — 37000008 HC ANESTHESIA 1ST 15 MINUTES: Performed by: ORTHOPAEDIC SURGERY

## 2025-03-20 PROCEDURE — 27000221 HC OXYGEN, UP TO 24 HOURS

## 2025-03-20 PROCEDURE — 25000003 PHARM REV CODE 250: Performed by: PHYSICIAN ASSISTANT

## 2025-03-20 PROCEDURE — 88311 DECALCIFY TISSUE: CPT | Performed by: PATHOLOGY

## 2025-03-20 PROCEDURE — 36000709 HC OR TIME LEV III EA ADD 15 MIN: Performed by: ORTHOPAEDIC SURGERY

## 2025-03-20 PROCEDURE — 63600175 PHARM REV CODE 636 W HCPCS: Performed by: NURSE ANESTHETIST, CERTIFIED REGISTERED

## 2025-03-20 PROCEDURE — 88307 TISSUE EXAM BY PATHOLOGIST: CPT | Performed by: PATHOLOGY

## 2025-03-20 PROCEDURE — 94761 N-INVAS EAR/PLS OXIMETRY MLT: CPT

## 2025-03-20 PROCEDURE — 63600175 PHARM REV CODE 636 W HCPCS

## 2025-03-20 PROCEDURE — 37000009 HC ANESTHESIA EA ADD 15 MINS: Performed by: ORTHOPAEDIC SURGERY

## 2025-03-20 PROCEDURE — 88304 TISSUE EXAM BY PATHOLOGIST: CPT | Mod: 26,,, | Performed by: PATHOLOGY

## 2025-03-20 PROCEDURE — 88304 TISSUE EXAM BY PATHOLOGIST: CPT | Performed by: PATHOLOGY

## 2025-03-20 PROCEDURE — 88311 DECALCIFY TISSUE: CPT | Mod: 26,,, | Performed by: PATHOLOGY

## 2025-03-20 PROCEDURE — 36000708 HC OR TIME LEV III 1ST 15 MIN: Performed by: ORTHOPAEDIC SURGERY

## 2025-03-20 DEVICE — PEEK SWIVELOCK, 3.9X17.9MM
Type: IMPLANTABLE DEVICE | Site: THUMB | Status: FUNCTIONAL
Brand: ARTHREX®

## 2025-03-20 DEVICE — FIBERLOCK SUSPENSION SYSTEM
Type: IMPLANTABLE DEVICE | Site: THUMB | Status: FUNCTIONAL
Brand: ARTHREX®

## 2025-03-20 RX ORDER — PROPOFOL 10 MG/ML
VIAL (ML) INTRAVENOUS
Status: DISCONTINUED | OUTPATIENT
Start: 2025-03-20 | End: 2025-03-20

## 2025-03-20 RX ORDER — HYDROCODONE BITARTRATE AND ACETAMINOPHEN 5; 325 MG/1; MG/1
1 TABLET ORAL EVERY 4 HOURS PRN
Status: DISCONTINUED | OUTPATIENT
Start: 2025-03-20 | End: 2025-03-20 | Stop reason: HOSPADM

## 2025-03-20 RX ORDER — MUPIROCIN 20 MG/G
OINTMENT TOPICAL 2 TIMES DAILY
Status: DISCONTINUED | OUTPATIENT
Start: 2025-03-20 | End: 2025-03-20 | Stop reason: HOSPADM

## 2025-03-20 RX ORDER — GLUCAGON 1 MG
1 KIT INJECTION
Status: DISCONTINUED | OUTPATIENT
Start: 2025-03-20 | End: 2025-03-20 | Stop reason: HOSPADM

## 2025-03-20 RX ORDER — FAMOTIDINE 10 MG/ML
INJECTION, SOLUTION INTRAVENOUS
Status: DISCONTINUED | OUTPATIENT
Start: 2025-03-20 | End: 2025-03-20

## 2025-03-20 RX ORDER — ONDANSETRON HYDROCHLORIDE 2 MG/ML
INJECTION, SOLUTION INTRAVENOUS
Status: DISCONTINUED | OUTPATIENT
Start: 2025-03-20 | End: 2025-03-20

## 2025-03-20 RX ORDER — HALOPERIDOL LACTATE 5 MG/ML
0.5 INJECTION, SOLUTION INTRAMUSCULAR EVERY 10 MIN PRN
Status: DISCONTINUED | OUTPATIENT
Start: 2025-03-20 | End: 2025-03-20 | Stop reason: HOSPADM

## 2025-03-20 RX ORDER — SODIUM CHLORIDE 9 MG/ML
INJECTION, SOLUTION INTRAVENOUS CONTINUOUS PRN
Status: DISCONTINUED | OUTPATIENT
Start: 2025-03-20 | End: 2025-03-20

## 2025-03-20 RX ORDER — DEXAMETHASONE SODIUM PHOSPHATE 4 MG/ML
INJECTION, SOLUTION INTRA-ARTICULAR; INTRALESIONAL; INTRAMUSCULAR; INTRAVENOUS; SOFT TISSUE
Status: DISCONTINUED | OUTPATIENT
Start: 2025-03-20 | End: 2025-03-20

## 2025-03-20 RX ORDER — CELECOXIB 200 MG/1
400 CAPSULE ORAL
Status: COMPLETED | OUTPATIENT
Start: 2025-03-20 | End: 2025-03-20

## 2025-03-20 RX ORDER — LIDOCAINE HYDROCHLORIDE 20 MG/ML
INJECTION INTRAVENOUS
Status: DISCONTINUED | OUTPATIENT
Start: 2025-03-20 | End: 2025-03-20

## 2025-03-20 RX ORDER — CEFAZOLIN 2 G/1
2 INJECTION, POWDER, FOR SOLUTION INTRAMUSCULAR; INTRAVENOUS
Status: COMPLETED | OUTPATIENT
Start: 2025-03-20 | End: 2025-03-20

## 2025-03-20 RX ORDER — ACETAMINOPHEN 500 MG
1000 TABLET ORAL
Status: COMPLETED | OUTPATIENT
Start: 2025-03-20 | End: 2025-03-20

## 2025-03-20 RX ORDER — ROPIVACAINE HYDROCHLORIDE 5 MG/ML
INJECTION, SOLUTION EPIDURAL; INFILTRATION; PERINEURAL
Status: COMPLETED | OUTPATIENT
Start: 2025-03-20 | End: 2025-03-20

## 2025-03-20 RX ORDER — MIDAZOLAM HYDROCHLORIDE 1 MG/ML
1 INJECTION, SOLUTION INTRAMUSCULAR; INTRAVENOUS
Status: DISCONTINUED | OUTPATIENT
Start: 2025-03-20 | End: 2025-03-20 | Stop reason: HOSPADM

## 2025-03-20 RX ORDER — FENTANYL CITRATE 50 UG/ML
100 INJECTION, SOLUTION INTRAMUSCULAR; INTRAVENOUS
Status: DISCONTINUED | OUTPATIENT
Start: 2025-03-20 | End: 2025-03-20 | Stop reason: HOSPADM

## 2025-03-20 RX ORDER — MUPIROCIN 20 MG/G
OINTMENT TOPICAL
Status: DISCONTINUED | OUTPATIENT
Start: 2025-03-20 | End: 2025-03-20 | Stop reason: HOSPADM

## 2025-03-20 RX ADMIN — LIDOCAINE HYDROCHLORIDE 100 MG: 20 INJECTION INTRAVENOUS at 09:03

## 2025-03-20 RX ADMIN — SODIUM CHLORIDE: 0.9 INJECTION, SOLUTION INTRAVENOUS at 08:03

## 2025-03-20 RX ADMIN — DEXAMETHASONE SODIUM PHOSPHATE 4 MG: 4 INJECTION, SOLUTION INTRAMUSCULAR; INTRAVENOUS at 09:03

## 2025-03-20 RX ADMIN — FENTANYL CITRATE 100 MCG: 50 INJECTION, SOLUTION INTRAMUSCULAR; INTRAVENOUS at 08:03

## 2025-03-20 RX ADMIN — PROPOFOL 125 MCG/KG/MIN: 10 INJECTION, EMULSION INTRAVENOUS at 09:03

## 2025-03-20 RX ADMIN — ROPIVACAINE HYDROCHLORIDE 30 ML: 5 INJECTION EPIDURAL; INFILTRATION; PERINEURAL at 08:03

## 2025-03-20 RX ADMIN — FAMOTIDINE 20 MG: 10 INJECTION, SOLUTION INTRAVENOUS at 09:03

## 2025-03-20 RX ADMIN — PROPOFOL 30 MG: 10 INJECTION, EMULSION INTRAVENOUS at 09:03

## 2025-03-20 RX ADMIN — MIDAZOLAM 2 MG: 1 INJECTION INTRAMUSCULAR; INTRAVENOUS at 08:03

## 2025-03-20 RX ADMIN — CEFAZOLIN 2 G: 2 INJECTION, POWDER, FOR SOLUTION INTRAMUSCULAR; INTRAVENOUS at 09:03

## 2025-03-20 RX ADMIN — HALOPERIDOL LACTATE 0.5 MG: 5 INJECTION, SOLUTION INTRAMUSCULAR at 12:03

## 2025-03-20 RX ADMIN — CELECOXIB 400 MG: 200 CAPSULE ORAL at 08:03

## 2025-03-20 RX ADMIN — ACETAMINOPHEN 1000 MG: 500 TABLET ORAL at 08:03

## 2025-03-20 RX ADMIN — PROPOFOL 100 MCG/KG/MIN: 10 INJECTION, EMULSION INTRAVENOUS at 09:03

## 2025-03-20 RX ADMIN — PROPOFOL 20 MG: 10 INJECTION, EMULSION INTRAVENOUS at 09:03

## 2025-03-20 RX ADMIN — ONDANSETRON 4 MG: 2 INJECTION INTRAMUSCULAR; INTRAVENOUS at 09:03

## 2025-03-20 RX ADMIN — MUPIROCIN: 20 OINTMENT TOPICAL at 08:03

## 2025-03-20 NOTE — DISCHARGE INSTRUCTIONS
HAND SURGERY - Care of the Hand after Surgery       Post-Op Care  It is important to follow your orthopaedic surgeon's instructions carefully after you return home. You should ask   someone to check on you that evening. The protocols described here are general in nature. Every person and   every surgery is different so the information given here is for guidance only. If you have questions you should   contact us.     Day of Surgery    You were place in a plaster splint today to protect the surgical area during healing. Do not remove the plaster splint until you are seen by Occupational Therapy, Farheen Doll PA-C or Dr. Cohen for your first postop visit    The hand and fingers may be numb for quite some time after surgery. This is due to the anesthetic block used at the time of surgery for pain control.   If you have an arm sling you may remove the sling at your convenience once you regain control of your arm from the anesthetic block.     Begin taking liquids and food as soon as you can. You should always eat some solid food, a sandwich or light meal, a little while before taking your pain meds.     Day 3  Things are much the same on the third day after your surgery. Usually you have less pain and feel like doing more.    Showering: It is important to keep the incision absolutely dry while showering or bathing (use two plastic bags over your hand) or a shower bag.   If you feel that the pain medication you were given after surgery is stronger than you really need you can reduce the dose, take it less frequently or switch to ibuprofen or Tylenol. If you received antibiotics they should be taken until the entire prescription is completed.    Day 7-10  Occupational Therapy will see you between this time. Please let us know if you are not scheduled [phone]    Day 10-11  We will see you back after your surgery to review with you what was done in surgery and will talk about rehab and answer any questions you may  have.       HAND SURGERY  Driving: You can drive if you are comfortable and have regained full finger movements and if you have sufficient power to control the vehicle.   Return to work: Timing of your return to work is variable according to your occupation and specific surgery. We should discuss this at your follow up visit if not already discussed prior.  Elevation: Hand elevation is important to prevent swelling and stiffness of the fingers. One minute of leaving your hand dangling negates four hours of keeping it elevated. Please remember not to walk with your hand hanging down or to sit with your hand resting in your lap. It is fine, however, to lower your hand for light use and you should get back to normal light activities as soon as possible as guided by common sense.     Post-operative exercises (PERFORM CHECKED EXERCISES ONLY)  [x] Bend your fingers  Relax your hand. Start with your fingers straight and close together. Bend the end and middle joints of your fingers. Keep your wrist and knuckles straight. Moving slowly and smoothly, return your hand to the starting position. Repeat with your other hand. If you can, perform multiple repetitions of this exercise on each hand.    [x] Open your hand wide                       Spread your fingers apart as wide as you can and hold that position. Slowly relax your fingers and bring them together. Return to the open-wide position. Repeat with each hand and gradually add to the number of repetitions.    [x] Make a fist  Start with your fingers straight and spread apart. Make a loose, gentle fist and wrap your thumb around the outside of your fingers. Be careful not to squeeze your fingers together too tightly. Moving slowly and smoothly, return to the starting position. Repeat. Perform this exercise on both hands.    [x] Touch your fingertips  Straighten your fingers and thumb. Bend your thumb across your palm, touching the tip of your thumb to the pad of your hand  "just below your pinky finger. If you can't make your thumb touch, just stretch as far as you can. Return your thumb to its starting position, as shown in images 1 through 3.  For the next exercise, form the letter O by touching your thumb to each fingertip, as shown in images 4 through 6. Moving slowly and smoothly, touch your index finger to your thumb, then straighten your fingers. Touch your middle finger to your thumb and straighten. Follow with your ring and pinky fingers.    [x] Walk your fingers  Rest your hand on a flat surface, such as a tabletop, with your palm facing down and your fingers spread slightly apart. Moving one finger at a time, slowly walk your fingers toward your thumb. Start by lifting and moving your index finger toward your thumb. Follow by lifting and moving your middle finger toward your thumb. Proceed with moving your ring finger and then your pinky finger toward your thumb. Don't move your wrist or thumb while doing this exercise. Repeat with your other hand.      HAND SURGERY - Common Concerns and Frequently Asked Questions    When to Call the Doctor  Pain, burning, or numbness of the fingers or the back of the hand not relieved by elevation of the arm  Pale or cold finger; bluish nail beds  Red line or streak going up the arm  Excessive swelling  Fever over 100.3ºF  Pain unrelieved by pain medication    Tips for one armed living  It helps to have...   In the shower   Plastic bags and rubber bands to cover bandages - the bags that newspapers come in are good to cover the hand and wrist. Otherwise small trash can liners will do. Use two at a time.   Bottle sponge (soft sponge on a long stick) - for the armpit of your "good" hand.   Shower brush   A hair brush in the shower will help you to wash your hair.   Cotton paul cloth bathrobe - to dry your back.    In the bathroom   Toothpaste, shampoo, etc. in flip-top or pump (not screw top) dispensers.   Consider an electric razor. " "  Flossers (dental floss on a "Y" shaped handle).    In the kitchen   Dycem mat (rubber jar opener mat) - to help open jars, but also keep things from sliding around while you are working on them.    Double suction cup pads ("little Octopus") - to hold items while you use or wash them.   Electric can opener with a lid magnet strong enough to hold the can in the air - for one handed use.   In the bedroom   Back scratcher.   Large sleeve shirts and tops.   Put away clothing which buttons, fastens or snaps in the back or which uses drawstrings.    Sports bra or a camisole instead of a bra.   [employer] nylons can be pulled on one handed - most others can't.   A "wash and wear" haircut.     "

## 2025-03-20 NOTE — OP NOTE
[facility])  Surgery Department  Operative Note    SUMMARY     Date of Procedure: 3/20/2025     Procedure:   1. Left thumb carpometacarpal joint arthroplasty, cpt 82338  2. Left intercarpal joint loose body removal, cpt 42310    Surgeons and Role:     * Isidra Cohen MD - Primary    Assisting Surgeon: None    Pre-Operative Diagnosis: Primary osteoarthritis of first carpometacarpal joint of left hand [M18.12]  Left wrist loose body    Post-Operative Diagnosis: Post-Op Diagnosis Codes:     * Primary osteoarthritis of first carpometacarpal joint of left hand [M18.12]  Left wrist loose body    Anesthesia: Regional    Indication for Procedure: 48 yo female with longstanding left basilar thumb arthritis who failed conservative management. A loose body was seen on preoperative x-ray.  Risks and benefits of the procedure were discussed with the patient and informed consent was obtained.    Description of the Findings of the Procedure: The patient was seen in the preoperative holding area and the left thumb was marked.  The patient was taken to the OR, placed supine on the table, and a padded hand table was used.  After Regional and MAC was administered without difficulty, a time-out procedure was performed identifying the patient, the operative site and the procedure to be performed.  A tourniquet was placed on the arm and the left upper extremity was prepped and draped in standard sterile fashion.  The left upper extremity was exsanguinated with an Esmarch bandage, and the tourniquet was inflated to 250 mm Hg.      A 15 blade scalpel was used to make a 3 cm incision over the left basilar thumb joint. Littler scissors were used to dissect down to the extensor tendons.  The interval between the EPB and APL tendons was retracted, and the CMC joint was sharply incised.  A curved osteotome was used to reflect the capsule from the trapezium.  Osteotomes were used to cleave the trapezium.  The trapezium was  removed in a piecemeal fashion with rongeurs.  A 5 x 5 mm loose body was found in the intercarpal space, this was completely excised and sent for permanent pathological specimen.    The trapeziiectomy was completed and attention was then turned towards the 1st to 2nd metacarpal tenodesis.  An Arthrex guidewire was placed in the appropriate orientation into the base of the 2nd metacarpal.  This was verified fluoroscopically.  Once the guidewire was in appropriate position, the threaded guide was placed into the base of the 2nd metacarpal.  The guidewire was then withdrawn and the drill was utilized to drill in the trajectory of the prior K-wire.  This was all done under fluoroscopic guidance.  The drill was then removed and the Arthrex FiberTak all suture suture anchor was placed bicortically through the base of the 2nd metacarpal without difficulty.  It was tensioned and was stable.  The 1st metacarpal and thumb was then pulled to the appropriate tensioning and the guidewire was placed into the base of the radial aspect of the 1st metacarpal to position for our DX SwiveLock anchor to secure suspensionplasty and tenodesis.  This was then drilled and the internal brace and SutureTape was anchored into the base of the 1st metacarpal taking care to tension the 1st metacarpal appropriately.  The suture tape was anchored in the saddle orientation of the 1st metacarpal base was secure.  After final placement of all of our hardware, the suture ends were trimmed.    The thumb was put through a range of motion including abduction, opposition and hand flat on the table, this was found to be satisfactory.  X-ray was used to confirm k-wire placement, tenodesis and CMC joint reduction. The wound was copiously irrigated with normal saline.  4-0 PDS was used to interpose the CMC fascia into the joint to prevent subluxation. The neurovascular bundles were identified and protected throughout the case.  Bipolar electrocautery was used  for hemostasis.  4-0 Monocryl was used to close the skin in a subcutaneous and running subcuticular fashion.  Dermabond, Adaptic, 4x4, cast padding, a thumb spica splint and Coban were used to dress the wrist.  The tourniquet was deflated and the hand and finger was pink and well-perfused.  The patient tolerated the procedure well.  She was taken awake and alert to the recovery room.    Complications: No    Estimated Blood Loss (EBL): minimal           Implants:   Implant Name Type Inv. Item Serial No.  Lot No. LRB No. Used Action   SYS FIBERLOCK SUSPENSION - [id number]  SYS FIBERLOCK SUSPENSION  [employer] [id number] Left 1 Implanted   PEEK SWIVELOCK SUTURE ANCHOR     [id number] Left 1 Implanted       Specimens:   Specimen (24h ago, onward)       Start     Ordered    03/20/25 1112  Specimen to Pathology, Surgery Orthopedics  Once        Comments: Pre-op Diagnosis: Primary osteoarthritis of first carpometacarpal joint of left hand [M18.12]Procedure(s):INTERPOSITION ARTHROPLASTY, CMC JOINT THUMB Number of specimens: 2Name of specimens: 1.left trapezium (for I.d.only) 2. Left wrist loose body (for I.d. only)     References:    Click here for ordering Quick Tip   Question Answer Comment   Procedure Type: Orthopedics    Release to patient Immediate        03/20/25 1123    03/20/25 1101  Specimen to Pathology, Surgery Orthopedics  Once        Comments: Pre-op Diagnosis: Primary osteoarthritis of first carpometacarpal joint of left hand [M18.12]Procedure(s):INTERPOSITION ARTHROPLASTY, CMC JOINT THUMB Number of specimens: 1Name of specimens: 1. Left trapezium     References:    Click here for ordering Quick Tip   Question Answer Comment   Procedure Type: Orthopedics    Release to patient Immediate        03/20/25 1101                            Condition: Good    Disposition: PACU - hemodynamically stable.    Attestation: I was present and scrubbed for the entire procedure.

## 2025-03-20 NOTE — PLAN OF CARE
VSS.  Patient tolerating oral liquids without difficulty.   No apparent s&s of distress noted at this time, no complaints voiced at this time.   Discharge instructions reviewed with patient/family/friend with good verbal feedback received.   Post op medications delivered at bedside.  Patient ready for discharge.

## 2025-03-20 NOTE — ANESTHESIA PREPROCEDURE EVALUATION
03/20/2025  Nan Mendez is a 49 y.o., female.      Pre-op Assessment    I have reviewed the Patient Summary Reports.     I have reviewed the Nursing Notes. I have reviewed the NPO Status.   I have reviewed the Medications.     Review of Systems  Anesthesia Hx:  No problems with previous Anesthesia   History of prior surgery of interest to airway management or planning:  Previous anesthesia: General        Denies Family Hx of Anesthesia complications.    Denies Personal Hx of Anesthesia complications.                    Social:  Non-Smoker       Hematology/Oncology:  Hematology Normal                       --  Cancer in past history:       Other (see Oncology comments)          Oncology Comments: Hodgkin's lymphoma     EENT/Dental:  EENT/Dental Normal           Cardiovascular:  Exercise tolerance: good   Hypertension                                          Pulmonary:  Pulmonary Normal                       Renal/:  Renal/ Normal                 Hepatic/GI:  Hepatic/GI Normal                    Musculoskeletal:  Musculoskeletal Normal                Neurological:  Neurology Normal                                      Endocrine:   Hypothyroidism          Dermatological:  Skin Normal    Psych:  Psychiatric Normal                    Physical Exam  General: Well nourished, Cooperative, Alert and Oriented    Airway:  Mallampati: III / II  Mouth Opening: Normal  TM Distance: Normal  Tongue: Normal  Neck ROM: Normal ROM    Dental:  Intact, Veneers    Chest/Lungs:  Clear to auscultation, Normal Respiratory Rate    Heart:  Rate: Normal  Rhythm: Regular Rhythm  Sounds: Normal    Abdomen:  Normal, Soft, Nontender        Anesthesia Plan  Type of Anesthesia, risks & benefits discussed:    Anesthesia Type: Gen Supraglottic Airway, Gen ETT, Gen Natural Airway, MAC, Regional  Intra-op Monitoring Plan: Standard  ASA Monitors  Post Op Pain Control Plan: multimodal analgesia and peripheral nerve block  Induction:  IV  Airway Plan: Direct, Post-Induction  Informed Consent: Informed consent signed with the Patient and all parties understand the risks and agree with anesthesia plan.  All questions answered.   ASA Score: 2    Ready For Surgery From Anesthesia Perspective.     .

## 2025-03-20 NOTE — BRIEF OP NOTE
[facility])  Surgery Department  Operative Note    SUMMARY     Date of Procedure: 3/20/2025     Procedure:   Interposition arthroplasty CMC joint left thumb  Loose body removal    Surgeons and Role:     * Isidra Cohen MD - Primary    Assisting Surgeon: None    Pre-Operative Diagnosis: Primary osteoarthritis of first carpometacarpal joint of left hand [M18.12]    Post-Operative Diagnosis: Post-Op Diagnosis Codes:     * Primary osteoarthritis of first carpometacarpal joint of left hand [M18.12]    Anesthesia: Regional    Operative Findings (including complications, if any): left wrist loose body    Estimated Blood Loss (EBL): minimal           Implants:   Implant Name Type Inv. Item Serial No.  Lot No. LRB No. Used Action   SYS FIBERLOCK SUSPENSION - [id number]  SYS FIBERLOCK SUSPENSION  [employer] [id number] Left 1 Implanted   PEEK SWIVELOCK SUTURE ANCHOR     [id number] Left 1 Implanted       Specimens:   Specimen (24h ago, onward)       Start     Ordered    03/20/25 1112  Specimen to Pathology, Surgery Orthopedics  Once        Comments: Pre-op Diagnosis: Primary osteoarthritis of first carpometacarpal joint of left hand [M18.12]Procedure(s):INTERPOSITION ARTHROPLASTY, CMC JOINT THUMB Number of specimens: 2Name of specimens: 1.left trapezium (for I.d.only) 2. Left wrist loose body (for I.d. only)     References:    Click here for ordering Quick Tip   Question Answer Comment   Procedure Type: Orthopedics    Release to patient Immediate        03/20/25 1123    03/20/25 1101  Specimen to Pathology, Surgery Orthopedics  Once        Comments: Pre-op Diagnosis: Primary osteoarthritis of first carpometacarpal joint of left hand [M18.12]Procedure(s):INTERPOSITION ARTHROPLASTY, CMC JOINT THUMB Number of specimens: 1Name of specimens: 1. Left trapezium     References:    Click here for ordering Quick Tip   Question Answer Comment   Procedure Type: Orthopedics    Release to patient Immediate         03/20/25 1101                            Condition: Good    Disposition: PACU - hemodynamically stable.    Attestation: Op Note Attestation: I was physically present and scrubbed for the entire procedure.

## 2025-03-20 NOTE — ANESTHESIA PROCEDURE NOTES
Left supraclavicular single shot    Patient location during procedure: pre-op   Block not for primary anesthetic.  Reason for block: at surgeon's request and post-op pain management   Post-op Pain Location: Left arm pain   Start time: 3/20/2025 8:50 AM  Timeout: 3/20/2025 8:50 AM   End time: 3/20/2025 8:55 AM    Staffing  Authorizing Provider: Silvio Bell MD  Performing Provider: Silvio Bell MD    Staffing  Performed by: Silvio Bell MD  Authorized by: Silvio Bell MD    Preanesthetic Checklist  Completed: patient identified, IV checked, site marked, risks and benefits discussed, surgical consent, monitors and equipment checked, pre-op evaluation and timeout performed  Peripheral Block  Patient position: supine  Prep: ChloraPrep  Patient monitoring: heart rate, cardiac monitor, continuous pulse ox, continuous capnometry and frequent blood pressure checks  Block type: supraclavicular  Laterality: left  Injection technique: single shot  Needle  Needle type: Stimuplex   Needle gauge: 22 G  Needle length: 2 in  Needle localization: anatomical landmarks and ultrasound guidance   -ultrasound image captured on disc.  Assessment  Injection assessment: negative aspiration, negative parasthesia and local visualized surrounding nerve  Paresthesia pain: none  Heart rate change: no  Slow fractionated injection: yes  Pain Tolerance: comfortable throughout block and no complaints  Medications:    Medications: ropivacaine (NAROPIN) injection 0.5% - Perineural   30 mL - 3/20/2025 8:55:00 AM    Additional Notes  VSS.  [facility] RN monitoring vitals throughout procedure.  Patient tolerated procedure well.    10ml 0.5% ropivacaine for ICB

## 2025-03-20 NOTE — PLAN OF CARE
Pre op complete. Questions answered. Resting comfortably. Call light in reach. Belongings with wife.

## 2025-03-20 NOTE — TRANSFER OF CARE
"Anesthesia Transfer of Care Note    Patient: Nan Mendez    Procedure(s) Performed: Procedure(s) (LRB):  INTERPOSITION ARTHROPLASTY, CMC JOINT THUMB (Left)  REMOVAL, FOREIGN BODY, UPPER EXTREMITY    Patient location: [facility]    Anesthesia Type: general    Transport from OR: Transported from OR on 6-10 L/min O2 by face mask with adequate spontaneous ventilation    Post pain: adequate analgesia    Post assessment: no apparent anesthetic complications and tolerated procedure well    Post vital signs: stable    Level of consciousness: awake    Nausea/Vomiting: no nausea/vomiting    Complications: none    Transfer of care protocol was followed      Last vitals: Visit Vitals  /68 (BP Location: Right arm, Patient Position: Lying)   Pulse 69   Temp 36.6 °C (97.9 °F) (Temporal)   Resp 16   Ht 5' 3" (1.6 m)   Wt 64.4 kg (142 lb)   LMP 02/21/2022   SpO2 99%   Breastfeeding No   BMI 25.15 kg/m²     "

## 2025-03-20 NOTE — DISCHARGE SUMMARY
[facility])  Discharge Note  Short Stay    Procedure(s) (LRB):  INTERPOSITION ARTHROPLASTY, CMC JOINT THUMB (Left)  REMOVAL, FOREIGN BODY, UPPER EXTREMITY      OUTCOME: Patient tolerated treatment/procedure well without complication and is now ready for discharge.    DISPOSITION: Home or Self Care    FINAL DIAGNOSIS:  left thumb CMC osteoarthritis    FOLLOWUP: In clinic    DISCHARGE INSTRUCTIONS:    Discharge Procedure Orders   Diet general     Keep surgical extremity elevated     Ice to affected area     Lifting restrictions     No driving, operating heavy equipment or signing legal documents while taking pain medication.     Leave dressing on - Keep it clean, dry, and intact until clinic visit     Call MD for:  temperature >100.4     Call MD for:  persistent nausea and vomiting     Call MD for:  severe uncontrolled pain     Call MD for:  difficulty breathing, headache or visual disturbances     Call MD for:  redness, tenderness, or signs of infection (pain, swelling, redness, odor or green/yellow discharge around incision site)     Call MD for:  hives     Call MD for:  persistent dizziness or light-headedness     Call MD for:  extreme fatigue

## 2025-03-21 NOTE — PROGRESS NOTES
Total Care Appointment      Subjective:      Patient ID: Nan Mendez is a 49 y.o. female.    Chief Complaint: Follow-up      HPI:    History of Present Illness    CHIEF COMPLAINT:  Nan presents today for 6-month follow-up of and following left first carpometacarpal joint surgery one week ago    LEFT CARPOMETACARPAL JOINT:  She initially reported left carpometacarpal joint pain one year ago. Initial X-ray showed mild early arthritis without joint malalignment or displacement. X-ray from one month ago revealed displacement and malalignment of the bone. A steroid injection provided no relief.    DERMATOLOGY:  She reports a suspicious skin lesion that appeared within the last three weeks. Dr. Collado evaluated her yesterday and recommended pathology. She is awaiting biopsy results.    MEDICAL HISTORY:  She has a history of Stage 2 Hodgkin's lymphoma diagnosed at age 29 (20 years ago). The condition was discovered when she felt a hard neck mass after sleeping in an awkward position. She underwent surgical lymph node removal, evidenced by a neck scar.    FAMILY HISTORY:  Her mother has a history of colon cancer and recent breast cancer treatment. Maternal genetic testing was negative for hereditary cancer syndromes. She expresses concern about her breast cancer risk given this family history.    MEDICATIONS:  She takes Exforge-HCT (amlodipine/valsartan/hydrochlorothiazide). Blood pressure readings have improved following medication adjustment.      ROS:  General: no fever, no chills, reports fatigue, no weight gain, no weight loss, reports decreased energy levels, reports change in weight  Eyes: no vision changes, no redness, no discharge  ENT: no ear pain, no nasal congestion, no sore throat  Cardiovascular: no chest pain, no palpitations, no lower extremity edema  Respiratory: no cough, no shortness of breath  Gastrointestinal: no abdominal pain, no nausea, no vomiting, no diarrhea, no constipation, no blood in  "stool  Genitourinary: no dysuria, no hematuria, no frequency  Musculoskeletal: no joint pain, no muscle pain, reports neck pain, reports limb pain, reports pain with movement  Skin: no rash, reports lesion  Neurological: no headache, no dizziness, no numbness, no tingling  Psychiatric: no anxiety, no depression, no sleep difficulty             3/6/2025 4/5/2024 1/19/2022   PHQ-9 Depression Patient Health Questionnaire   Over the last two weeks how often have you been bothered by little interest or pleasure in doing things 0 0 0    Over the last two weeks how often have you been bothered by feeling down, depressed or hopeless 0 0 0        Data saved with a previous flowsheet row definition          3/3/2023     1:06 PM   GAD7   1. Feeling nervous, anxious, or on edge? 1   2. Not being able to stop or control worrying? 0   3. Worrying too much about different things? 1   4. Trouble relaxing? 0   5. Being so restless that it is hard to sit still? 0   6. Becoming easily annoyed or irritable? 1   7. Feeling afraid as if something awful might happen? 0   8. If you checked off any problems, how difficult have these problems made it for you to do your work, take care of things at home, or get along with other people? 0   BELKIS-7 Score 3         Objective:     PHYSICAL EXAM   Vital Signs  /78   Pulse 80   Resp 16   Ht 5' 3" (1.6 m)   Wt 64.7 kg (142 lb 8.4 oz)   LMP 02/21/2022   SpO2 98%   BMI 25.25 kg/m²     Physical Exam    Vitals: Blood pressure: 137/79. Blood pressure: 120/78.  General: Well-developed. Well-nourished. No acute distress.  Eyes: EOMI. Sclerae anicteric.  HENT: Normocephalic. Atraumatic. Nares patent. Moist oral mucosa.  Cardiovascular: Regular rate. Regular rhythm. No murmurs. No rubs. No gallops. Normal S1, S2.  Respiratory: Normal respiratory effort. Clear to auscultation bilaterally. No rales. No rhonchi. No wheezing.  Musculoskeletal: No  obvious deformity. Left wrist in a " brace.  Extremities: No lower extremity edema.  Neurological: Alert & oriented x3. No slurred speech. Normal gait.  Psychiatric: Normal mood. Normal affect. Good insight. Good judgment.  Skin: Warm. Dry. No rash.       Assessment:   49 y.o. female here for primary care visit       Plan:   1. Primary hypertension  Overview:  Current regimen:  Exforge-HCT 5-160-25 mg daily    Assessment & Plan:  UACR Microalb/Creat Ratio   Date Value Ref Range Status   03/06/2023 40.3 (H) 0.0 - 30.0 ug/mg Final      BMP BMP  Lab Results   Component Value Date     03/14/2025    K 3.6 03/14/2025     03/14/2025    CO2 26 03/14/2025    BUN 16 03/14/2025    CREATININE 0.7 03/14/2025    CALCIUM 9.9 03/14/2025    ANIONGAP 9 03/14/2025    EGFRNORACEVR >60.0 03/14/2025      **recheck UACR (if albuminuria present, then needs to be on max dose ARB)  (Exforge-HCT doesn't come with a formulation of 5-320-25 mg >> so if we have to increase ARB, we will need to change to Exforge 5-320 mg + HCTZ 25 mg as 2 separate pills)      Orders:  -     Microalbumin/Creatinine Ratio, Urine; Future    2. Acquired hypothyroidism  Overview:  Thyroid armour 60 mg daily     Assessment & Plan:  Lab Results   Component Value Date    TSH 0.567 04/05/2024   **recheck TSH    Orders:  -     TSH; Future; Expected date: 03/28/2025    3. Pain of left hand  Overview:  Pain at base of thumb over left MCP joint    Assessment & Plan:  Initial x-ray showed some mild degenerative changes without any displacement  --underwent PT and a steroid injection >> injection did not help at all  --however a FU xray showed some joint displaced warranting surgical intervention  --she does recall a fall on that wrist over a year while skiing  --she is now one week post-op and recovering well      4. At increased risk for cardiovascular disease  Overview:  Due to mantle XRT/chemoRx for Hodgkin's Lymphoma    Assessment & Plan:  Triglycerides (mg/dL)   Date Value   04/05/2024 81      Triglyceride (mg/dL)   Date Value   07/19/2024 65     HDL (mg/dL)   Date Value   04/05/2024 70     HDL Cholesterol (mg/dL)   Date Value   07/19/2024 83     Lab Results   Component Value Date    LDLCALC 143 (H) 07/19/2024   The 10-year ASCVD risk score (Naida HYLTON, et al., 2019) is: 1.5%    Values used to calculate the score:      Age: 49 years      Sex: Female      Is Non- : No      Diabetic: No      Tobacco smoker: No      Systolic Blood Pressure: 137 mmHg      Is BP treated: Yes      HDL Cholesterol: 83 mg/dL      Total Cholesterol: 239 mg/dL        5. Healthcare maintenance  Assessment & Plan:  She is due for both Shingles vaccine and PCV        Follow up in about 6 months (around 9/28/2025).    Yomi Jones MD/[facility]  Internal Medicine  [facility]

## 2025-03-21 NOTE — ASSESSMENT & PLAN NOTE
UACR Microalb/Creat Ratio   Date Value Ref Range Status   03/06/2023 40.3 (H) 0.0 - 30.0 ug/mg Final      BMP BMP  Lab Results   Component Value Date     03/14/2025    K 3.6 03/14/2025     03/14/2025    CO2 26 03/14/2025    BUN 16 03/14/2025    CREATININE 0.7 03/14/2025    CALCIUM 9.9 03/14/2025    ANIONGAP 9 03/14/2025    EGFRNORACEVR >60.0 03/14/2025      **recheck UACR (if albuminuria present, then needs to be on max dose ARB)  (Exforge-HCT doesn't come with a formulation of 5-320-25 mg >> so if we have to increase ARB, we will need to change to Exforge 5-320 mg + HCTZ 25 mg as 2 separate pills)

## 2025-03-24 ENCOUNTER — TELEPHONE (OUTPATIENT)
Dept: ORTHOPEDICS | Facility: CLINIC | Age: 50
End: 2025-03-24

## 2025-03-24 LAB
FINAL PATHOLOGIC DIAGNOSIS: NORMAL
GROSS: NORMAL
Lab: NORMAL

## 2025-03-24 NOTE — TELEPHONE ENCOUNTER
Patient is doing well, is not taking any pain medication since POD2 and has not had to dose any OTC medications since Saturday night.     Mario Leung MS, OTC   Sports Medicine Assistant   [facility]  (P) [phone]  (F) [phone]

## 2025-03-26 ENCOUNTER — TELEPHONE (OUTPATIENT)
Dept: ORTHOPEDICS | Facility: CLINIC | Age: 50
End: 2025-03-26
Payer: COMMERCIAL

## 2025-03-26 NOTE — ANESTHESIA POSTPROCEDURE EVALUATION
Anesthesia Post Evaluation    Patient: Nan Mendez    Procedure(s) Performed: Procedure(s) (LRB):  INTERPOSITION ARTHROPLASTY, CMC JOINT THUMB (Left)  LOOSE BODY REMOVAL (Left)    Final Anesthesia Type: general      Patient location during evaluation: PACU  Patient participation: Yes- Able to Participate  Level of consciousness: awake and alert and oriented  Post-procedure vital signs: reviewed and stable  Pain management: adequate  Airway patency: patent    PONV status at discharge: No PONV  Anesthetic complications: no      Cardiovascular status: hemodynamically stable  Respiratory status: unassisted, spontaneous ventilation and room air  Hydration status: euvolemic  Follow-up not needed.              Vitals Value Taken Time   /68 03/20/25 12:47   Temp 36.5 °C (97.7 °F) 03/20/25 13:00   Pulse 66 03/20/25 13:00   Resp 23 03/20/25 12:56   SpO2 99 % 03/20/25 13:00   Vitals shown include unfiled device data.      Event Time   Out of Recovery 12:20:00         Pain/Tamiko Score: No data recorded

## 2025-03-26 NOTE — TELEPHONE ENCOUNTER
Patient communication     Notified patient to stop at [facility] - 1st floor check in [address] [facility] [address] 30 minutes prior to your appointment time on 3/31/2025 with Farheen Doll PA-C for x-rays.     Made them aware that this is not a scheduled xray appointment and they might be running behind as they are considered a walk-in xray.    Verbalized the Following:  *Please arrive at your informed time above, if you are more than 15 Minutes late to your appointment with Farheen Doll PA-C we will have to reschedule your appointment. This will allow you to be seen in a timely manor and be conscious to other patients being seen that same day*     Naomy Simeon MA  [facility]  P: [phone]  F: [phone]

## 2025-03-27 ENCOUNTER — CLINICAL SUPPORT (OUTPATIENT)
Dept: REHABILITATION | Facility: HOSPITAL | Age: 50
End: 2025-03-27
Attending: ORTHOPAEDIC SURGERY
Payer: COMMERCIAL

## 2025-03-27 ENCOUNTER — TELEPHONE (OUTPATIENT)
Dept: ORTHOPEDICS | Facility: CLINIC | Age: 50
End: 2025-03-27

## 2025-03-27 ENCOUNTER — OFFICE VISIT (OUTPATIENT)
Dept: DERMATOLOGY | Facility: CLINIC | Age: 50
End: 2025-03-27
Payer: COMMERCIAL

## 2025-03-27 DIAGNOSIS — R29.898 DECREASED GRIP STRENGTH OF LEFT HAND: ICD-10-CM

## 2025-03-27 DIAGNOSIS — D22.39 FIBROUS PAPULE OF NOSE: ICD-10-CM

## 2025-03-27 DIAGNOSIS — D48.5 NEOPLASM OF UNCERTAIN BEHAVIOR OF SKIN: Primary | ICD-10-CM

## 2025-03-27 DIAGNOSIS — M25.642 DECREASED RANGE OF MOTION OF LEFT THUMB: Primary | ICD-10-CM

## 2025-03-27 PROCEDURE — 3044F HG A1C LEVEL LT 7.0%: CPT | Mod: CPTII,S$GLB,, | Performed by: DERMATOLOGY

## 2025-03-27 PROCEDURE — 99999 PR PBB SHADOW E&M-EST. PATIENT-LVL III: CPT | Mod: PBBFAC,,, | Performed by: DERMATOLOGY

## 2025-03-27 PROCEDURE — 1160F RVW MEDS BY RX/DR IN RCRD: CPT | Mod: CPTII,S$GLB,, | Performed by: DERMATOLOGY

## 2025-03-27 PROCEDURE — 1159F MED LIST DOCD IN RCRD: CPT | Mod: CPTII,S$GLB,, | Performed by: DERMATOLOGY

## 2025-03-27 PROCEDURE — 11102 TANGNTL BX SKIN SINGLE LES: CPT | Mod: S$GLB,,, | Performed by: DERMATOLOGY

## 2025-03-27 PROCEDURE — 99212 OFFICE O/P EST SF 10 MIN: CPT | Mod: 25,S$GLB,, | Performed by: DERMATOLOGY

## 2025-03-27 PROCEDURE — L3913 HFO W/O JOINTS CF: HCPCS

## 2025-03-27 NOTE — TELEPHONE ENCOUNTER
----- Message from OT Rani sent at 3/27/2025  7:47 AM CDT -----  Regarding: referral image  Obi Martin, The image on her referral did not show up. Are you able to send via in basket for reference for splint Dr. Cohen is wanting? Thanks so much Rani

## 2025-03-27 NOTE — PROGRESS NOTES
Subjective:      Patient ID:  Nan Mendez is a 49 y.o. female who presents for   Chief Complaint   Patient presents with    Lesion     Right anterior neck, and nose     History of Present Illness: The patient presents for lesions.    The patient was last seen on: 9/29/2023 for shave biopsy of right malar cheek.    Biopsy results: 1. Skin, right malar cheek, biopsy:   - DILATED PORE OF SANTO.   - FEATURES SUGGESTIVE OF ROSACEA.   - NEGATIVE FOR MALIGNANCY.     Patient with new area of concern: lesion  Location: right anterior neck  Duration: 1 month  Symptoms: none    Patient with new area of concern: lesion  Location: nose (on the right side)  Duration: 1 year  Symptoms: none    Pt concerned due to FHx of mother with Hx of NMSC.                Review of Systems   Skin:  Positive for activity-related sunscreen use and wears hat (sometimes). Negative for daily sunscreen use and recent sunburn.   Hematologic/Lymphatic: Does not bruise/bleed easily.       Objective:   Physical Exam   Constitutional: She appears well-developed and well-nourished. No distress.   Neurological: She is alert and oriented to person, place, and time. She is not disoriented.   Psychiatric: She has a normal mood and affect.   Skin:   Areas Examined (abnormalities noted in diagram):   Head / Face Inspection Performed                  Diagram Legend     Erythematous scaling macule/papule c/w actinic keratosis       Vascular papule c/w angioma      Pigmented verrucoid papule/plaque c/w seborrheic keratosis      Yellow umbilicated papule c/w sebaceous hyperplasia      Irregularly shaped tan macule c/w lentigo     1-2 mm smooth white papules consistent with Milia      Movable subcutaneous cyst with punctum c/w epidermal inclusion cyst      Subcutaneous movable cyst c/w pilar cyst      Firm pink to brown papule c/w dermatofibroma      Pedunculated fleshy papule(s) c/w skin tag(s)      Evenly pigmented macule c/w junctional nevus     Mildly  variegated pigmented, slightly irregular-bordered macule c/w mildly atypical nevus      Flesh colored to evenly pigmented papule c/w intradermal nevus       Pink pearly papule/plaque c/w basal cell carcinoma      Erythematous hyperkeratotic cursted plaque c/w SCC      Surgical scar with no sign of skin cancer recurrence      Open and closed comedones      Inflammatory papules and pustules      Verrucoid papule consistent consistent with wart     Erythematous eczematous patches and plaques     Dystrophic onycholytic nail with subungual debris c/w onychomycosis     Umbilicated papule    Erythematous-base heme-crusted tan verrucoid plaque consistent with inflamed seborrheic keratosis     Erythematous Silvery Scaling Plaque c/w Psoriasis     See annotation      Assessment / Plan:      Pathology Orders:       Normal Orders This Visit    Specimen to Pathology, Dermatology     Questions:    Procedure Type: Dermatology and skin neoplasms    Number of Specimens: 1    ------------------------: -------------------------    Spec 1 Procedure: Shave Biopsy    Spec 1 Clinical Impression: r/o BCC    Spec 1 Source: right lower neck    Clinical Information: see EPIC    Clinical History: see EPIC    Specimen Source: Skin    Release to patient: Immediate    Send normal result to authorizing provider's In Basket if patient is active on MyChart: Yes          Neoplasm of uncertain behavior of skin  Shave biopsy procedure note:    Shave biopsy performed after verbal consent including risk of infection, scar, recurrence, need for additional treatment of site. Area prepped with alcohol, anesthetized with approximately 1.0cc of 1% lidocaine with epinephrine. Lesional tissue shaved with razor blade. Hemostasis achieved with application of aluminum chloride followed by hyfrecation. No complications. Dressing applied. Wound care explained.    If biopsy positive for malignancy, refer to Dr. Antonio for Mohs surgery consultation.   -     Specimen  to Pathology, Dermatology    Fibrous papule of nose  Reassurance given to patient. No treatment is necessary.   Treatment of benign, asymptomatic lesions may be considered cosmetic.             Follow up in about 3 months (around 6/27/2025) for TBSE.

## 2025-03-27 NOTE — PROGRESS NOTES
OT Orthosis Only    TIME RECORD    Date:  3/27/2025      Start Time:  0930  Stop Time:  1030      Occupational Therapy Orthotic Note    Patient: Nan Mendez  MRN: [id number]  Date of visit: 3/27/2025  Referring Physician:  Isidra Cohen MD   Next MD visit: 5/5/2025  Primary Diagnosis: s/p cmc interposition arthoplasty and trapezectomy   Treatment Diagnosis:   Encounter Diagnoses   Name Primary?    Decreased range of motion of left thumb Yes    Decreased  strength of left hand      DOS:  3/20/2025      Subjective:       Objective:     Patient seen by OT this session for fabrication of orthosis per MD orders. Fabricated and applied thumb spica (radial abduction)   Assessment:     Orthosis with good fit following fabrication. Pt. Able to cherelle/doff independently.      Patient Education/Response:     Orthotic Fit and Training, 60 minutes:    Pt. Instructed to wear continuous, remove for bathing or hygiene. Patient/caregiver were provided written instructions on orthosis purpose, wear schedule, care and precautions to monitor for increased pain/edema, pressure points, skin breakdown or redness/skin irritation Patient/caregiver to contact clinic for adjustments as needed.  Patient signed copy of orthosis instructions, acknowledging delivery and understanding of wear, care, and precautions     (see Media for scanned documents)    Plans and Goals:     Goal of Orthosis to protect sx site/protect injury site   Pt. To DC with  Orthosis. Orthosis Check PRN, f/u with MD at [facility]    
ambulatory

## 2025-03-28 ENCOUNTER — OFFICE VISIT (OUTPATIENT)
Dept: PRIMARY CARE CLINIC | Facility: CLINIC | Age: 50
End: 2025-03-28
Attending: INTERNAL MEDICINE
Payer: COMMERCIAL

## 2025-03-28 ENCOUNTER — LAB VISIT (OUTPATIENT)
Dept: LAB | Facility: HOSPITAL | Age: 50
End: 2025-03-28
Payer: COMMERCIAL

## 2025-03-28 VITALS
RESPIRATION RATE: 16 BRPM | DIASTOLIC BLOOD PRESSURE: 78 MMHG | HEIGHT: 63 IN | BODY MASS INDEX: 25.25 KG/M2 | WEIGHT: 142.5 LBS | OXYGEN SATURATION: 98 % | SYSTOLIC BLOOD PRESSURE: 120 MMHG | HEART RATE: 80 BPM

## 2025-03-28 DIAGNOSIS — Z91.89 AT INCREASED RISK FOR CARDIOVASCULAR DISEASE: ICD-10-CM

## 2025-03-28 DIAGNOSIS — I10 PRIMARY HYPERTENSION: Primary | ICD-10-CM

## 2025-03-28 DIAGNOSIS — M79.642 PAIN OF LEFT HAND: ICD-10-CM

## 2025-03-28 DIAGNOSIS — I10 PRIMARY HYPERTENSION: ICD-10-CM

## 2025-03-28 DIAGNOSIS — Z00.00 HEALTHCARE MAINTENANCE: ICD-10-CM

## 2025-03-28 DIAGNOSIS — E03.9 ACQUIRED HYPOTHYROIDISM: ICD-10-CM

## 2025-03-28 LAB
ALBUMIN/CREAT UR: 12.5 UG/MG
CREAT UR-MCNC: 56 MG/DL (ref 15–325)
MICROALBUMIN UR-MCNC: 7 UG/ML (ref ?–5000)

## 2025-03-28 PROCEDURE — 82043 UR ALBUMIN QUANTITATIVE: CPT

## 2025-03-28 NOTE — ASSESSMENT & PLAN NOTE
Initial x-ray showed some mild degenerative changes without any displacement  --underwent PT and a steroid injection >> injection did not help at all  --however a FU xray showed some joint displaced warranting surgical intervention  --she does recall a fall on that wrist over a year while skiing  --she is now one week post-op and recovering well

## 2025-03-28 NOTE — ASSESSMENT & PLAN NOTE
Triglycerides (mg/dL)   Date Value   04/05/2024 81     Triglyceride (mg/dL)   Date Value   07/19/2024 65     HDL (mg/dL)   Date Value   04/05/2024 70     HDL Cholesterol (mg/dL)   Date Value   07/19/2024 83     Lab Results   Component Value Date    LDLCALC 143 (H) 07/19/2024   The 10-year ASCVD risk score (Naida HYLTON, et al., 2019) is: 1.5%    Values used to calculate the score:      Age: 49 years      Sex: Female      Is Non- : No      Diabetic: No      Tobacco smoker: No      Systolic Blood Pressure: 137 mmHg      Is BP treated: Yes      HDL Cholesterol: 83 mg/dL      Total Cholesterol: 239 mg/dL

## 2025-03-31 ENCOUNTER — OFFICE VISIT (OUTPATIENT)
Dept: ORTHOPEDICS | Facility: CLINIC | Age: 50
End: 2025-03-31
Payer: COMMERCIAL

## 2025-03-31 ENCOUNTER — RESULTS FOLLOW-UP (OUTPATIENT)
Dept: PRIMARY CARE CLINIC | Facility: CLINIC | Age: 50
End: 2025-03-31

## 2025-03-31 ENCOUNTER — HOSPITAL ENCOUNTER (OUTPATIENT)
Dept: RADIOLOGY | Facility: HOSPITAL | Age: 50
Discharge: HOME | End: 2025-03-31
Payer: COMMERCIAL

## 2025-03-31 DIAGNOSIS — M18.12 PRIMARY OSTEOARTHRITIS OF FIRST CARPOMETACARPAL JOINT OF LEFT HAND: ICD-10-CM

## 2025-03-31 DIAGNOSIS — M79.642 LEFT HAND PAIN: ICD-10-CM

## 2025-03-31 DIAGNOSIS — Z98.890 POSTOPERATIVE STATE: Primary | ICD-10-CM

## 2025-03-31 PROBLEM — M25.642 DECREASED RANGE OF MOTION OF LEFT THUMB: Status: ACTIVE | Noted: 2025-03-31

## 2025-03-31 PROCEDURE — 99999 PR PBB SHADOW E&M-EST. PATIENT-LVL III: CPT | Mod: PBBFAC,,,

## 2025-03-31 PROCEDURE — 73130 X-RAY EXAM OF HAND: CPT | Mod: 26,LT,, | Performed by: RADIOLOGY

## 2025-03-31 PROCEDURE — 73130 X-RAY EXAM OF HAND: CPT | Mod: TC,LT

## 2025-03-31 NOTE — PROGRESS NOTES
[facility]  Hand and Upper Extremity  Post Op Visit  SUBJECTIVE:  Nan Mendez presents for post-operative evaluation of   Encounter Diagnoses   Name Primary?    Postoperative state Yes    Primary osteoarthritis of first carpometacarpal joint of left hand      History of Present Illness:  The patient is now 11 days status post left thumb CMC arthroplasty and left intercarpal joint loose body removal by Dr. Cohen on 3/20/2025. Overall the patient reports doing well post-operatively. The patient reports appropriate post-operative soreness with well controlled overall pain. she reports compliance with her custom thumb spica splint, but states it is unpleasant and difficult to done on and off. She begin ROM in OT tomorrow. Patient denies redness, warmth, or drainage from surgical site. She denies fever, chills, or sweats since the surgery.      Vitals:    03/31/25 1045   PainSc:   2   PainLoc: Finger      OBJECTIVE:  Physical Exam:    Vital signs are stable, patient is afebrile.  AA&O x 4.  NAD.  HEENT: NCAT, sclera nonicteric  Lungs: Respirations are equal and unlabored.  CV: 2+ bilateral upper and lower extremity pulses.  MSK: The wound is healing well with no erythema, warmth, or drainage. Incision is clean, dry, and intact. There are no clinical signs or symptoms of infection present. Monocryl suture ends clipped without difficulty. Appropriate post-op stiffness to thumb and wrist. Able to make a loose fist. Neurovascularly intact.    Diagnostics Review:  X-rays AP, lateral and oblique left hand taken today are independently reviewed by me and shows well aligned carpal trapeziectomy.    Intra-Op Pathology:  Final Pathologic Diagnosis 1. Bone, left trapezium, biopsy:  Bone with degenerative changes (osteoarthritis).    2. Left wrist loose body, excision/removal:  Loose body.     Assessment & Plan: Status post above, doing well.   Post-op instructions reviewed and provided to patient including wound  care, scar massage, range of motion exercises, and lifting restrictions  Continue full-time splinting with thumb spica orthosis, remove for hygiene and exercises purposes. At 4 wk post-op can remove while sleep.  Continue occupational therapy  Continue with range of motion exercises; no strengthening until 6 weeks post-op   Remain non-weightbearing. No lifting pulling pushing with left hand until 6 weeks post-op.  Educated patient on scar massage.   Follow up in 4 weeks or sooner for any post-op problems  Call with any questions/concerns in the interim    Farheen Doll PA-C  Hand and Upper Extremity

## 2025-04-01 ENCOUNTER — CLINICAL SUPPORT (OUTPATIENT)
Dept: REHABILITATION | Facility: HOSPITAL | Age: 50
End: 2025-04-01
Payer: COMMERCIAL

## 2025-04-01 DIAGNOSIS — M25.642 DECREASED RANGE OF MOTION OF LEFT THUMB: Primary | ICD-10-CM

## 2025-04-01 PROCEDURE — 97165 OT EVAL LOW COMPLEX 30 MIN: CPT

## 2025-04-02 ENCOUNTER — PATIENT MESSAGE (OUTPATIENT)
Dept: DERMATOLOGY | Facility: CLINIC | Age: 50
End: 2025-04-02
Payer: COMMERCIAL

## 2025-04-02 ENCOUNTER — CLINICAL SUPPORT (OUTPATIENT)
Dept: REHABILITATION | Facility: HOSPITAL | Age: 50
End: 2025-04-02
Payer: COMMERCIAL

## 2025-04-02 ENCOUNTER — RESULTS FOLLOW-UP (OUTPATIENT)
Dept: DERMATOLOGY | Facility: CLINIC | Age: 50
End: 2025-04-02

## 2025-04-02 DIAGNOSIS — M25.642 DECREASED RANGE OF MOTION OF LEFT THUMB: Primary | ICD-10-CM

## 2025-04-02 PROCEDURE — 97140 MANUAL THERAPY 1/> REGIONS: CPT

## 2025-04-02 PROCEDURE — 97110 THERAPEUTIC EXERCISES: CPT

## 2025-04-02 NOTE — PROGRESS NOTES
Outpatient Rehab    Occupational Therapy Visit    Patient Name: Nan Mendez  MRN: [id number]  YOB: 1975  Encounter Date: 4/2/2025    Therapy Diagnosis:   Encounter Diagnosis   Name Primary?    Decreased range of motion of left thumb Yes     Physician: Yomi Jones MD    Physician Orders: Eval and Treat  Medical Diagnosis: Pain in left hand  Unilateral primary osteoarthritis of first carpometacarpal joint, left hand    Visit # / Visits Authorized: 4 / 10  Insurance Authorization Period: 2/18/2025 to 12/31/2025  Date of Evaluation: 4/1/2025  Plan of Care Certification: 4/1/2025 to 6/27/2025     Time In: 1115   Time Out: 1157  Total Time: 42   Total Billable Time:      FOTO:  Intake Score:  %  Survey Score 1:  %  Survey Score 2:  %         Subjective   Feeling a little sore. I think the splint was pressing at end of the day.  Pain reported as 5/10.      Objective           Treatment:  Therapeutic Exercise  TE 1: gentle CW/CCW circles x 10 reps each, IP jt blocks, radial/palmar abduction within comfortable range x 10 reps  TE 2: FTG x 10 reps  Manual Therapy  MT 1: STM using tissue tool, scar massage to promote tissue extensibility  Modalities  Moist Heat (min): Pt received moist heat X 10 minutes    Time Entry(in minutes):       Assessment & Plan   Assessment: Reminded to avoid any discomfort.  Evaluation/Treatment Tolerance: Patient tolerated treatment well    Patient will continue to benefit from skilled outpatient occupational therapy to address the deficits listed in the problem list box on initial evaluation, provide pt/family education and to maximize pt's level of independence in the home and community environment.     Patient's spiritual, cultural, and educational needs considered and patient agreeable to plan of care and goals.           Plan: cont per original POC    Goals:   Active       Pt will report improvement in FOTO score by 10-15 points        Pt will demo improved thumb ROM  by 5-8 degrees in all planes of motion (Progressing)       Start:  04/04/25    Expected End:  06/27/25            Pt will report compliance with HEP and orthoses wear  (Progressing)       Start:  04/04/25    Expected End:  06/27/25            Pt will report pain level no greater than 1-2/10 with light activity  (Progressing)       Start:  04/04/25    Expected End:  06/27/25            Assess  and pinch when appropriate  (Progressing)       Start:  04/04/25    Expected End:  06/27/25                Rani Garcia, OT

## 2025-04-02 NOTE — PROGRESS NOTES
Outpatient Rehab    Occupational Therapy Evaluation (only)    Patient Name: Nan Mendez  MRN: [id number]  YOB: 1975  Encounter Date: 2025    Therapy Diagnosis:   Encounter Diagnosis   Name Primary?    Decreased range of motion of left thumb Yes     Physician: Yomi Jones MD    Physician Orders: Eval and Treat  Medical Diagnosis: Pain in left hand  Unilateral primary osteoarthritis of first carpometacarpal joint, left hand    Visit # / Visits Authorized: 4 / 10  Insurance Authorization Period: 2025 to 2025  Date of Evaluation: 2025  Plan of Care Certification: 2025 to 2025     Time In: 0830   Time Out: 0930  Total Time: 60   Total Billable Time:      Intake Outcome Measure for FOTO Survey    Therapist reviewed FOTO scores for Nan Mendez on 2025.   FOTO report - see Media section or FOTO account episode details.     Intake Score:  %         Subjective   History of Present Illness  Nan is a 49 y.o. female        Patient reports a surgery of L CMC interposition arthoplasty.  Diagnostic tests related to this condition: X-ray.        Dominant Hand: Right  History of Present Condition/Illness: Pt reports she fell skiing and developed pain in L CMC. It gradually got worse. Decided to have sx. 3/20/25 interposition arthoplasty by Dr. Cohen.     Activities of Daily Living  Social history was obtained from Patient.                   Pain     Patient reports a current pain level of 5/10.  Pain at worst is reported as 5/10.   Location: Left CMC  Clinical Progression (since onset): Stable  Pain Qualities: Aching, Tightness           Past Medical History/Physical Systems Review:   Nan Mendez  has a past medical history of Acquired hypothyroidism, Cancer, Hypertension, and Infertility, female.    Nan Mendez  has a past surgical history that includes  section (2004);  section (2011); Carpal tunnel release (Bilateral);  Abdominal surgery; Robot-assisted laparoscopic abdominal hysterectomy using da Giovani Xi (N/A, 3/14/2022); Robot-assisted laparoscopic lysis of adhesions using da Giovani Xi (N/A, 3/14/2022); Interposition arthroplasty of carpometacarpal joints (Left, 3/20/2025); and Removal of foreign body from upper extremity (Left, 3/20/2025).    Nan has a current medication list which includes the following prescription(s): amlodipine-valsartan-hcthiazid, clobetasol 0.05%, diclofenac sodium, docusate sodium, hydrocodone-acetaminophen, multivitamin 50 plus, fish oil-omega-3 fatty acids, ondansetron, and thyroid (pork).    Review of patient's allergies indicates:  No Known Allergies     Objective       Digit 1 - Thumb Active Range of Motion  Right Thumb   Flexion (deg) Extension (deg) Pain   CMC         MCP 53       IP 54         Right Thumb   Range (deg) Pain   Palmar ABduction 46     Radial ABduction 45     ADduction         Left Thumb   Flexion (deg) Extension (deg) Pain   CMC         MCP 28       IP 35         Left Thumb   Range (deg) Pain   Palmar ABduction 42     Radial ABduction 39     ADduction                            Time Entry(in minutes):       Assessment & Plan   Assessment  Nan presents with a condition of Low complexity.   Presentation of Symptoms: Evolving  Will Comorbidities Impact Care: No       ADL Limitations : Bathing/showering, Dressing, Feeding, Personal hygiene and grooming  IADL Limitations: Grocery/shopping, Meal preparation and cleanup, Home establishment and management, Driving  Leisure Limitations: Leisure exploration, Leisure participation  Functional Limitations: Carrying objects, Fine motor coordination, Manipulating objects, Pain with ADLs/IADLs, Range of motion, Proprioception                 Evaluation/Treatment Response: Patient responded to treatment well, Patient limited by pain       Plan  From an occupational therapy perspective, the patient would benefit from: Skilled Rehab  Services    Planned therapy interventions include: Therapeutic exercise, Therapeutic activities, Neuromuscular re-education, Manual therapy, ADLs/IADLs, and Orthotic management and training.    Planned modalities to include: Fluidotherapy, Ultrasound, and Paraffin bath.        Visit Frequency: 2 times Per Week for 12 Weeks.       This plan was discussed with Patient.   Discussion participants: Agreed Upon Plan of Care             Patient's spiritual, cultural, and educational needs considered and patient agreeable to plan of care and goals.           Goals:   Active       Pt will report improvement in FOTO score by 10-15 points        Pt will demo improved thumb ROM by 5-8 degrees in all planes of motion       Start:  04/04/25    Expected End:  06/27/25            Pt will report compliance with HEP and orthoses wear        Start:  04/04/25    Expected End:  06/27/25            Pt will report pain level no greater than 1-2/10 with light activity        Start:  04/04/25    Expected End:  06/27/25            Assess  and pinch when appropriate        Start:  04/04/25    Expected End:  06/27/25                Rani Garcia OT

## 2025-04-03 ENCOUNTER — CLINICAL SUPPORT (OUTPATIENT)
Dept: REHABILITATION | Facility: HOSPITAL | Age: 50
End: 2025-04-03
Payer: COMMERCIAL

## 2025-04-03 ENCOUNTER — TELEPHONE (OUTPATIENT)
Dept: DERMATOLOGY | Facility: CLINIC | Age: 50
End: 2025-04-03
Payer: COMMERCIAL

## 2025-04-03 ENCOUNTER — PATIENT MESSAGE (OUTPATIENT)
Dept: DERMATOLOGY | Facility: CLINIC | Age: 50
End: 2025-04-03
Payer: COMMERCIAL

## 2025-04-03 DIAGNOSIS — M25.642 DECREASED RANGE OF MOTION OF LEFT THUMB: Primary | ICD-10-CM

## 2025-04-03 PROCEDURE — 97140 MANUAL THERAPY 1/> REGIONS: CPT

## 2025-04-03 PROCEDURE — 97110 THERAPEUTIC EXERCISES: CPT

## 2025-04-03 NOTE — TELEPHONE ENCOUNTER
LVM for patient to return call at earliest convenience for scheduling.     ELSIE Abrams        ----- Message from Margi Dahl MD sent at 4/2/2025  4:40 PM CDT -----  Please contact patient to schedule Mohs consultation. Discussed other treatment options and pt prefers mohs. Thank you.     . Skin, right lower neck, shave biopsy:   - BASAL CELL CARCINOMA, SUPERFICIAL TYPE.  - MARGINS ARE NEGATIVE IN THE PLANES OF SECTION EXAMINED.  ----- Message -----  From: Lab, Background User  Sent: 3/31/2025  10:27 PM CDT  To: Margi Dahl MD

## 2025-04-03 NOTE — TELEPHONE ENCOUNTER
Patient returning call for scheduling from referral from Dr. Dahl. Patient declined consult appointment. Offered and scheduled Mohs for next available on 4/28 @ 1 pm. Confirmed understanding of appointment time, date, and location.       ELSIE Abrams

## 2025-04-07 ENCOUNTER — CLINICAL SUPPORT (OUTPATIENT)
Dept: REHABILITATION | Facility: HOSPITAL | Age: 50
End: 2025-04-07
Payer: COMMERCIAL

## 2025-04-07 DIAGNOSIS — S49.91XA ARM INJURY, RIGHT, INITIAL ENCOUNTER: Primary | ICD-10-CM

## 2025-04-07 PROCEDURE — 97140 MANUAL THERAPY 1/> REGIONS: CPT

## 2025-04-07 PROCEDURE — 97110 THERAPEUTIC EXERCISES: CPT

## 2025-04-07 NOTE — PROGRESS NOTES
Outpatient Rehab    Occupational Therapy Visit    Patient Name: Nan Mendez  MRN: [id number]  YOB: 1975  Encounter Date: 4/7/2025    Therapy Diagnosis:   Encounter Diagnosis   Name Primary?    Arm injury, right, initial encounter Yes     Physician: Yomi Jones MD    Physician Orders: Eval and Treat  Medical Diagnosis: Pain in left hand  Unilateral primary osteoarthritis of first carpometacarpal joint, left hand    Visit # / Visits Authorized: 5 / 10  Insurance Authorization Period: 2/18/2025 to 12/31/2025  Date of Evaluation: 4/1/2025  Plan of Care Certification: 4/1/2025 to 6/27/2025     Time In:  9:30 am    Time Out: 10:30 am    Total Time:     Total Billable Time:      FOTO:  Intake Score: 45%  Survey Score 1:  %  Survey Score 2:  %         Subjective   thumb is slighlty sore, i cannot use hair dryer, I cannot fold clothes.  Pain reported as 4/10. pain at base of CMC    Objective           Treatment:  Therapeutic Exercise  TE 1: gentle CW/CCW circles x 10 reps each, IP jt blocks, radial/palmar abduction within comfortable range x 10 reps  TE 2: FTG x 10 reps  TE 3: finger / thumb wheel , isospheres on the table , keyhole pegboard  TE 4: scar massage X 8 minutes over incision  Manual Therapy  MT 1: STM using tissue tool, scar massage to promote tissue extensibility  Modalities  Moist Heat (min): Pt received moist heat X 10 minutes    Time Entry(in minutes):       Assessment & Plan   Assessment: tolerated ROM well       Patient will continue to benefit from skilled outpatient occupational therapy to address the deficits listed in the problem list box on initial evaluation, provide pt/family education and to maximize pt's level of independence in the home and community environment.     Patient's spiritual, cultural, and educational needs considered and patient agreeable to plan of care and goals.           Plan: cont per original POC    Goals:   Active       Pt will report improvement in  FOTO score by 10-15 points        Pt will demo improved thumb ROM by 5-8 degrees in all planes of motion (Progressing)       Start:  04/04/25    Expected End:  06/27/25            Pt will report compliance with HEP and orthoses wear  (Progressing)       Start:  04/04/25    Expected End:  06/27/25            Pt will report pain level no greater than 1-2/10 with light activity  (Progressing)       Start:  04/04/25    Expected End:  06/27/25            Assess  and pinch when appropriate  (Progressing)       Start:  04/04/25    Expected End:  06/27/25                Lilly Head, OT

## 2025-04-07 NOTE — PROGRESS NOTES
Outpatient Rehab    Occupational Therapy Visit    Patient Name: Nan Mendez  MRN: [id number]  YOB: 1975  Encounter Date: 4/3/2025    Therapy Diagnosis:   Encounter Diagnosis   Name Primary?    Decreased range of motion of left thumb Yes     Physician: Yomi Jones MD    Physician Orders: Eval and Treat  Medical Diagnosis: Pain in left hand  Unilateral primary osteoarthritis of first carpometacarpal joint, left hand    Visit # / Visits Authorized: 5 / 10  Insurance Authorization Period: 2/18/2025 to 12/31/2025  Date of Evaluation: 4/1/2025  Plan of Care Certification: 4/1/2025 to 6/27/2025     Time In: 0830   Time Out: 0915  Total Time: 45   Total Billable Time:      FOTO:  Intake Score:  %  Survey Score 1:  %  Survey Score 2:  %         Subjective   splint feels better. still same soreness.  Pain reported as 5/10.      Objective           Treatment:  Therapeutic Exercise  TE 1: gentle CW/CCW circles x 10 reps each, IP jt blocks, radial/palmar abduction within comfortable range x 10 reps  TE 2: FTG x 10 reps  Manual Therapy  MT 1: STM using tissue tool, scar massage to promote tissue extensibility    Time Entry(in minutes):       Assessment & Plan   Assessment: demo good ROM. cont to have mild discomfort       Patient will continue to benefit from skilled outpatient occupational therapy to address the deficits listed in the problem list box on initial evaluation, provide pt/family education and to maximize pt's level of independence in the home and community environment.     Patient's spiritual, cultural, and educational needs considered and patient agreeable to plan of care and goals.           Plan: cont per original POC    Goals:    left CMC pain  Problems        left CMC pain  Problems (Active)       Functional outcome       Patient will show  increase in FOTO  by 10 pts patient-reported outcome tool to demonstrate subjective improvement (Progressing)       Start:  02/20/25    Expected  End:  04/30/25               Hand and arm use        Patient will push open a door   (Progressing)       Start:  02/20/25    Expected End:  04/30/25               Home activities       Patient will cut food with knife in left hand  (Progressing)       Start:  02/20/25    Expected End:  04/30/25               Lifting & carrying objects       Patient will carry grocery bags and gallon of milk (Progressing)       Start:  02/20/25    Expected End:  04/30/25               Pain       Patient will report pain of 2/10 demonstrating a reduction of overall pain (Progressing)       Start:  02/20/25    Expected End:  04/30/25                 Outpatient Rehab - Rehab - Occupational Therapy - March 2025 Problems       Outpatient Rehab - Rehab - Occupational Therapy - March 2025 Problems (Active)       Pt will report improvement in FOTO score by 10-15 points        Pt will demo improved thumb ROM by 5-8 degrees in all planes of motion (Progressing)       Start:  04/04/25    Expected End:  06/27/25            Pt will report compliance with HEP and orthoses wear  (Progressing)       Start:  04/04/25    Expected End:  06/27/25            Pt will report pain level no greater than 1-2/10 with light activity  (Progressing)       Start:  04/04/25    Expected End:  06/27/25            Assess  and pinch when appropriate  (Progressing)       Start:  04/04/25    Expected End:  06/27/25                   Rani Garcia OT

## 2025-04-11 ENCOUNTER — CLINICAL SUPPORT (OUTPATIENT)
Dept: REHABILITATION | Facility: HOSPITAL | Age: 50
End: 2025-04-11
Payer: COMMERCIAL

## 2025-04-11 DIAGNOSIS — M25.642 DECREASED RANGE OF MOTION OF LEFT THUMB: Primary | ICD-10-CM

## 2025-04-11 PROCEDURE — 97022 WHIRLPOOL THERAPY: CPT

## 2025-04-11 PROCEDURE — 97110 THERAPEUTIC EXERCISES: CPT

## 2025-04-11 PROCEDURE — 97140 MANUAL THERAPY 1/> REGIONS: CPT

## 2025-04-14 ENCOUNTER — CLINICAL SUPPORT (OUTPATIENT)
Dept: REHABILITATION | Facility: HOSPITAL | Age: 50
End: 2025-04-14
Payer: COMMERCIAL

## 2025-04-14 DIAGNOSIS — M25.642 DECREASED RANGE OF MOTION OF LEFT THUMB: Primary | ICD-10-CM

## 2025-04-14 PROCEDURE — 97140 MANUAL THERAPY 1/> REGIONS: CPT

## 2025-04-14 PROCEDURE — 97110 THERAPEUTIC EXERCISES: CPT

## 2025-04-14 NOTE — PROGRESS NOTES
Outpatient Rehab    Occupational Therapy Visit    Patient Name: Nan Mendez  MRN: [id number]  YOB: 1975  Encounter Date: 4/11/2025    Therapy Diagnosis:   Encounter Diagnosis   Name Primary?    Decreased range of motion of left thumb Yes     Physician: Yomi Jones MD    Physician Orders: Eval and Treat  Medical Diagnosis: Pain in left hand  Unilateral primary osteoarthritis of first carpometacarpal joint, left hand    Visit # / Visits Authorized: 6 / 10  Insurance Authorization Period: 2/18/2025 to 12/31/2025  Date of Evaluation: 4/1/2025  Plan of Care Certification: 4/1/2025 to 6/27/2025     Time In: 0930   Time Out: 1030  Total Time: 60   Total Billable Time:      FOTO:  Intake Score:  %  Survey Score 1:  %  Survey Score 2:  %         Subjective   still feeling stiff. I am unable to do anything with gripping.  Pain reported as 4/10.      Objective           Treatment:  Therapeutic Exercise  TE 1: thumb ROM all ways x 10 reps (2 sets)  TE 2: isospheres (2 min)  TE 3: wrist maze 3 min  TE 4: in hand manipulation with large poms (3 sets)  Manual Therapy  MT 1: tissue tool dorsal/volar L thumb distal volar wrist to reduce tissue tightness, scar massage desensitization  Modalities  Fluidotherapy: Fluidotherapy: To involved hand for 10 min, continuous air, 115 deg, air speed 50 to decrease pain, edema & scar tissue, sensory re- education, and increased tissue extensibility prior to therex    Time Entry(in minutes):  [place] Time Entry: 10  Manual Therapy Time Entry: 15  Therapeutic Exercise Time Entry: 25    Assessment & Plan   Assessment: cont to demo good ROM. progressing as expected       Patient will continue to benefit from skilled outpatient occupational therapy to address the deficits listed in the problem list box on initial evaluation, provide pt/family education and to maximize pt's level of independence in the home and community environment.     Patient's spiritual, cultural, and  educational needs considered and patient agreeable to plan of care and goals.           Plan: cont per original POC    Goals:   Active       Pt will report improvement in FOTO score by 10-15 points        Pt will demo improved thumb ROM by 5-8 degrees in all planes of motion (Progressing)       Start:  04/04/25    Expected End:  06/27/25            Pt will report compliance with HEP and orthoses wear  (Progressing)       Start:  04/04/25    Expected End:  06/27/25            Pt will report pain level no greater than 1-2/10 with light activity  (Progressing)       Start:  04/04/25    Expected End:  06/27/25            Assess  and pinch when appropriate  (Progressing)       Start:  04/04/25    Expected End:  06/27/25                Rani Garcia, OT

## 2025-04-14 NOTE — PROGRESS NOTES
Outpatient Rehab    Occupational Therapy Evaluation    Patient Name: Nan Mendez  MRN: [id number]  YOB: 1975  Encounter Date: 2025    Therapy Diagnosis: No diagnosis found.  Physician: Yomi Jones MD    Physician Orders: Eval and Treat  Medical Diagnosis: Pain in left hand  Unilateral primary osteoarthritis of first carpometacarpal joint, left hand    Visit # / Visits Authorized: 7 / 10  Insurance Authorization Period: 2025 to 2025  Date of Evaluation: 2025   Plan of Care Certification: 2025 to 25      Time In: 0930   Time Out: 1030  Total Time: 60   Total Billable Time:      Intake Outcome Measure for FOTO Survey    Therapist reviewed FOTO scores for Nan Mendez on 2025.   FOTO report - see Media section or FOTO account episode details.     Intake Score: 45%         Subjective      Pain     Patient reports a current pain level of 1/10.        pain at base of CMC        Past Medical History/Physical Systems Review:   Nan Mendez  has a past medical history of Acquired hypothyroidism, Cancer, Hypertension, and Infertility, female.    Nan Mendez  has a past surgical history that includes  section (2004);  section (2011); Carpal tunnel release (Bilateral); Abdominal surgery; Robot-assisted laparoscopic abdominal hysterectomy using da Giovani Xi (N/A, 3/14/2022); Robot-assisted laparoscopic lysis of adhesions using da Giovani Xi (N/A, 3/14/2022); Interposition arthroplasty of carpometacarpal joints (Left, 3/20/2025); and Removal of foreign body from upper extremity (Left, 3/20/2025).    Nan has a current medication list which includes the following prescription(s): amlodipine-valsartan-hcthiazid, clobetasol 0.05%, diclofenac sodium, docusate sodium, hydrocodone-acetaminophen, multivitamin 50 plus, fish oil-omega-3 fatty acids, ondansetron, and thyroid (pork).    Review of patient's allergies indicates:  No  Known Allergies     Objective           Treatment:  Therapeutic Exercise  TE 1: thumb ROM all ways x 10 reps (2 sets)  TE 2: isospheres (2 min)  TE 3: wrist maze 3 min  TE 4: in hand manipulation with large poms (3 sets) finger ladder wheel 2 minutes  Manual Therapy  MT 1: tissue tool dorsal/volar L thumb distal volar wrist to reduce tissue tightness, scar massage desensitization  Modalities  Moist Heat (min): Pt received moist heat X 10 minutes    Time Entry(in minutes):       Assessment & Plan        Patient's spiritual, cultural, and educational needs considered and patient agreeable to plan of care and goals.           Goals:    left CMC pain  Problems        left CMC pain  Problems (Active)       Functional outcome       Patient will show  increase in FOTO  by 10 pts patient-reported outcome tool to demonstrate subjective improvement (Progressing)       Start:  02/20/25    Expected End:  04/30/25               Hand and arm use        Patient will push open a door   (Progressing)       Start:  02/20/25    Expected End:  04/30/25               Home activities       Patient will cut food with knife in left hand  (Progressing)       Start:  02/20/25    Expected End:  04/30/25               Lifting & carrying objects       Patient will carry grocery bags and gallon of milk (Progressing)       Start:  02/20/25    Expected End:  04/30/25               Pain       Patient will report pain of 2/10 demonstrating a reduction of overall pain (Progressing)       Start:  02/20/25    Expected End:  04/30/25                 Outpatient Rehab - Rehab - Occupational Therapy - March 2025 Problems       Outpatient Rehab - Rehab - Occupational Therapy - March 2025 Problems (Active)       Pt will report improvement in FOTO score by 10-15 points        Pt will demo improved thumb ROM by 5-8 degrees in all planes of motion (Progressing)       Start:  04/04/25    Expected End:  06/27/25            Pt will report compliance with HEP and  orthoses wear  (Progressing)       Start:  04/04/25    Expected End:  06/27/25            Pt will report pain level no greater than 1-2/10 with light activity  (Progressing)       Start:  04/04/25    Expected End:  06/27/25            Assess  and pinch when appropriate  (Progressing)       Start:  04/04/25    Expected End:  06/27/25                   Lilly Head OT

## 2025-04-17 ENCOUNTER — CLINICAL SUPPORT (OUTPATIENT)
Dept: REHABILITATION | Facility: HOSPITAL | Age: 50
End: 2025-04-17
Payer: COMMERCIAL

## 2025-04-17 DIAGNOSIS — M25.642 DECREASED RANGE OF MOTION OF LEFT THUMB: Primary | ICD-10-CM

## 2025-04-17 PROCEDURE — 97110 THERAPEUTIC EXERCISES: CPT

## 2025-04-17 PROCEDURE — 97022 WHIRLPOOL THERAPY: CPT

## 2025-04-17 NOTE — PROGRESS NOTES
Outpatient Rehab    Occupational Therapy Evaluation    Patient Name: Nan Mendez  MRN: [id number]  YOB: 1975  Encounter Date: 2025    Therapy Diagnosis:   Encounter Diagnosis   Name Primary?    Decreased range of motion of left thumb Yes     Physician: Yomi Jones MD    Physician Orders: Eval and Treat  Medical Diagnosis: Pain in left hand  Unilateral primary osteoarthritis of first carpometacarpal joint, left hand    Visit # / Visits Authorized: 8 / 10  Insurance Authorization Period: 2025 to 2025  Date of Evaluation: 2025   Plan of Care Certification: 2025 to 25      Time In: 0930   Time Out: 1030  Total Time: 60   Total Billable Time:      Intake Outcome Measure for FOTO Survey    Therapist reviewed FOTO scores for Nan Mendez on 2025.   FOTO report - see Media section or FOTO account episode details.     Intake Score: 45%         Subjective             Past Medical History/Physical Systems Review:   Nan Mendez  has a past medical history of Acquired hypothyroidism, Cancer, Hypertension, and Infertility, female.    Nan Mendez  has a past surgical history that includes  section (2004);  section (2011); Carpal tunnel release (Bilateral); Abdominal surgery; Robot-assisted laparoscopic abdominal hysterectomy using da Giovani Xi (N/A, 3/14/2022); Robot-assisted laparoscopic lysis of adhesions using da Gioavni Xi (N/A, 3/14/2022); Interposition arthroplasty of carpometacarpal joints (Left, 3/20/2025); and Removal of foreign body from upper extremity (Left, 3/20/2025).    Nan has a current medication list which includes the following prescription(s): amlodipine-valsartan-hcthiazid, clobetasol 0.05%, diclofenac sodium, docusate sodium, hydrocodone-acetaminophen, multivitamin 50 plus, fish oil-omega-3 fatty acids, ondansetron, and thyroid (pork).    Review of patient's allergies indicates:  No Known Allergies      Objective           Treatment:  Therapeutic Exercise  TE 1: thumb ROM all ways x 10 reps (2 sets)  TE 2: isospheres (2 min)  TE 3: wrist maze 3 min, blue wheel wrist flexion / extension  TE 4: in hand manipulation with large poms (3 sets) finger ladder wheel 2 minutes  TE 10: Pt is 4 weeks post op today, she will see MD in 2 weeks  Modalities  Fluidotherapy: Fluidotherapy: To involved hand for 10 min, continuous air, 115 deg, air speed 50 to decrease pain, edema & scar tissue, sensory re- education, and increased tissue extensibility prior to therex    Time Entry(in minutes):       Assessment & Plan        Patient's spiritual, cultural, and educational needs considered and patient agreeable to plan of care and goals.     Education  Education was done with Patient.                   Goals:    left CMC pain  Problems        left CMC pain  Problems (Active)       Functional outcome       Patient will show  increase in FOTO  by 10 pts patient-reported outcome tool to demonstrate subjective improvement (Progressing)       Start:  02/20/25    Expected End:  04/30/25               Hand and arm use        Patient will push open a door   (Progressing)       Start:  02/20/25    Expected End:  04/30/25               Home activities       Patient will cut food with knife in left hand  (Progressing)       Start:  02/20/25    Expected End:  04/30/25               Lifting & carrying objects       Patient will carry grocery bags and gallon of milk (Progressing)       Start:  02/20/25    Expected End:  04/30/25               Pain       Patient will report pain of 2/10 demonstrating a reduction of overall pain (Progressing)       Start:  02/20/25    Expected End:  04/30/25                 Outpatient Rehab - Rehab - Occupational Therapy - March 2025 Problems       Outpatient Rehab - Rehab - Occupational Therapy - March 2025 Problems (Active)       Pt will report improvement in FOTO score by 10-15 points        Pt will demo  improved thumb ROM by 5-8 degrees in all planes of motion (Progressing)       Start:  04/04/25    Expected End:  06/27/25            Pt will report compliance with HEP and orthoses wear  (Progressing)       Start:  04/04/25    Expected End:  06/27/25            Pt will report pain level no greater than 1-2/10 with light activity  (Progressing)       Start:  04/04/25    Expected End:  06/27/25            Assess  and pinch when appropriate  (Progressing)       Start:  04/04/25    Expected End:  06/27/25                   Lilly Head OT

## 2025-04-21 ENCOUNTER — TELEPHONE (OUTPATIENT)
Dept: OBSTETRICS AND GYNECOLOGY | Facility: CLINIC | Age: 50
End: 2025-04-21
Payer: COMMERCIAL

## 2025-04-21 NOTE — TELEPHONE ENCOUNTER
----- Message from WITOI sent at 4/21/2025  2:30 PM CDT -----  Name of Who is Calling: ADEEL KENNEDY [[id number]]What is the request in detail: Pt called regarding appt for tomorrow and would like to speak with the office.Please contact to further discuss and advise.Can the clinic reply by [facility]: YWhat Number to Call Back if not [facility]:  [phone]

## 2025-04-22 ENCOUNTER — CLINICAL SUPPORT (OUTPATIENT)
Dept: REHABILITATION | Facility: HOSPITAL | Age: 50
End: 2025-04-22
Payer: COMMERCIAL

## 2025-04-22 DIAGNOSIS — S49.91XA ARM INJURY, RIGHT, INITIAL ENCOUNTER: Primary | ICD-10-CM

## 2025-04-22 PROCEDURE — 97022 WHIRLPOOL THERAPY: CPT

## 2025-04-22 PROCEDURE — 97110 THERAPEUTIC EXERCISES: CPT

## 2025-04-22 PROCEDURE — 97140 MANUAL THERAPY 1/> REGIONS: CPT

## 2025-04-22 NOTE — PROGRESS NOTES
Outpatient Rehab    Occupational Therapy Visit    Patient Name: Nan Mendez  MRN: [id number]  YOB: 1975  Encounter Date: 4/22/2025    Therapy Diagnosis:   Encounter Diagnosis   Name Primary?    Arm injury, right, initial encounter Yes     Physician: Yomi Jones MD    Physician Orders: Eval and Treat  Medical Diagnosis: Pain in left hand  Unilateral primary osteoarthritis of first carpometacarpal joint, left hand    Visit # / Visits Authorized: 9 / 10  Insurance Authorization Period: 2/18/2025 to 12/31/2025      Date of Evaluation: 4/1/2025  Plan of Care Certification: 4/1/2025 to 6/27/2025       Time In: 0930   Time Out: 1030  Total Time: 60   Total Billable Time:      FOTO:  Intake Score: 45%  Survey Score 1:  %  Survey Score 2:  %         Subjective   I think I over did it , I was doing alot for easter as well as peeing crawfish.  Pain reported as 3/10. pain at base of CMC    Objective           Treatment:  Therapeutic Exercise  TE 1: thumb ROM all ways x 10 reps (2 sets), opposition thumb to small finger  TE 2: isospheres (2 min)  TE 3: wrist maze 3 min, blue wheel wrist flexion / extension  TE 4: in hand manipulation with large poms (3 sets) finger ladder wheel 2 minutes  TE 6: pt is 4.5  weeks post op, next visit will decrease splint wear at night , start strenthening at 6 week spost op  Manual Therapy  MT 1: scar massage --Performed massage decrease edema, improve joint mobility, decrease adhesions, decrease pain and improve lymphatic drainage to increase hand function. with IASTM tissue movers  Modalities  Fluidotherapy: Fluidotherapy: To involved hand for 10 min, continuous air, 115 deg, air speed 50 to decrease pain, edema & scar tissue, sensory re- education, and increased tissue extensibility prior to therex    Time Entry(in minutes):       Assessment & Plan   Assessment: pt has good motion , progressing well,       Patient will continue to benefit from skilled outpatient  occupational therapy to address the deficits listed in the problem list box on initial evaluation, provide pt/family education and to maximize pt's level of independence in the home and community environment.     Patient's spiritual, cultural, and educational needs considered and patient agreeable to plan of care and goals.           Plan: cont per original POC, start strengtheing at 6 weeks post op    Goals:    left CMC pain  Problems        left CMC pain  Problems (Active)       Functional outcome       Patient will show  increase in FOTO  by 10 pts patient-reported outcome tool to demonstrate subjective improvement (Progressing)       Start:  02/20/25    Expected End:  04/30/25               Hand and arm use        Patient will push open a door   (Progressing)       Start:  02/20/25    Expected End:  04/30/25               Home activities       Patient will cut food with knife in left hand  (Progressing)       Start:  02/20/25    Expected End:  04/30/25               Lifting & carrying objects       Patient will carry grocery bags and gallon of milk (Progressing)       Start:  02/20/25    Expected End:  04/30/25               Pain       Patient will report pain of 2/10 demonstrating a reduction of overall pain (Progressing)       Start:  02/20/25    Expected End:  04/30/25                 Outpatient Rehab - Rehab - Occupational Therapy - March 2025 Problems       Outpatient Rehab - Rehab - Occupational Therapy - March 2025 Problems (Active)       Pt will report improvement in FOTO score by 10-15 points        Pt will demo improved thumb ROM by 5-8 degrees in all planes of motion (Progressing)       Start:  04/04/25    Expected End:  06/27/25            Pt will report compliance with HEP and orthoses wear  (Progressing)       Start:  04/04/25    Expected End:  06/27/25            Pt will report pain level no greater than 1-2/10 with light activity  (Progressing)       Start:  04/04/25    Expected End:  06/27/25             Assess  and pinch when appropriate  (Progressing)       Start:  04/04/25    Expected End:  06/27/25                   Lilly Head OT

## 2025-04-24 ENCOUNTER — CLINICAL SUPPORT (OUTPATIENT)
Dept: REHABILITATION | Facility: HOSPITAL | Age: 50
End: 2025-04-24
Payer: COMMERCIAL

## 2025-04-24 DIAGNOSIS — S49.91XA ARM INJURY, RIGHT, INITIAL ENCOUNTER: Primary | ICD-10-CM

## 2025-04-24 PROCEDURE — 97110 THERAPEUTIC EXERCISES: CPT

## 2025-04-24 NOTE — PROGRESS NOTES
Outpatient Rehab    Occupational Therapy Visit    Patient Name: Nan Mendez  MRN: [id number]  YOB: 1975  Encounter Date: 4/24/2025    Therapy Diagnosis: No diagnosis found.  Physician: Yomi Jones MD    Physician Orders: Eval and Treat  Medical Diagnosis: Pain in left hand  Unilateral primary osteoarthritis of first carpometacarpal joint, left hand    Visit # / Visits Authorized: 10 / 10  Insurance Authorization Period: 2/18/2025 to 12/31/2025    Date of Evaluation: 4/1/2025  Plan of Care Certification: 4/1/2025 to 6/27/2025       Time In: 0930   Time Out: 1020  Total Time: 50   Total Billable Time:      FOTO:  Intake Score: 45%  Survey Score 1:  %  Survey Score 2:  %         Subjective             Objective           Treatment:  Therapeutic Exercise  TE 1: thumb ROM all ways x 10 reps (2 sets), opposition thumb to small finger  TE 2: isospheres (2 min)  TE 3: wrist maze 3 min, blue wheel wrist flexion / extension  TE 6: pt is 5  weeks post op, next visit will decrease splint wear at night , start strenthening at 6 week spost op    Time Entry(in minutes):       Assessment & Plan   Assessment: pt has good motion , progressing well, increased soreness from peeling crawfish without brace       Patient will continue to benefit from skilled outpatient occupational therapy to address the deficits listed in the problem list box on initial evaluation, provide pt/family education and to maximize pt's level of independence in the home and community environment.     Patient's spiritual, cultural, and educational needs considered and patient agreeable to plan of care and goals.           Plan: cont per original POC, start strengtheing at 6 weeks post op    Goals:    left CMC pain  Problems        left CMC pain  Problems (Active)       Functional outcome       Patient will show  increase in FOTO  by 10 pts patient-reported outcome tool to demonstrate subjective improvement (Progressing)       Start:   02/20/25    Expected End:  04/30/25               Hand and arm use        Patient will push open a door   (Progressing)       Start:  02/20/25    Expected End:  04/30/25               Home activities       Patient will cut food with knife in left hand  (Progressing)       Start:  02/20/25    Expected End:  04/30/25               Lifting & carrying objects       Patient will carry grocery bags and gallon of milk (Progressing)       Start:  02/20/25    Expected End:  04/30/25               Pain       Patient will report pain of 2/10 demonstrating a reduction of overall pain (Progressing)       Start:  02/20/25    Expected End:  04/30/25                 Outpatient Rehab - Rehab - Occupational Therapy - March 2025 Problems       Outpatient Rehab - Rehab - Occupational Therapy - March 2025 Problems (Active)       Pt will report improvement in FOTO score by 10-15 points        Pt will demo improved thumb ROM by 5-8 degrees in all planes of motion (Progressing)       Start:  04/04/25    Expected End:  06/27/25            Pt will report compliance with HEP and orthoses wear  (Progressing)       Start:  04/04/25    Expected End:  06/27/25            Pt will report pain level no greater than 1-2/10 with light activity  (Progressing)       Start:  04/04/25    Expected End:  06/27/25            Assess  and pinch when appropriate  (Progressing)       Start:  04/04/25    Expected End:  06/27/25                   Lilly Head OT

## 2025-04-24 NOTE — PROGRESS NOTES
Pt reports that she has been sore all week, she reports that last weekend,she peeled crawfish with her brace for a while and it has been sore very since

## 2025-04-28 ENCOUNTER — PROCEDURE VISIT (OUTPATIENT)
Dept: DERMATOLOGY | Facility: CLINIC | Age: 50
End: 2025-04-28
Payer: COMMERCIAL

## 2025-04-28 VITALS — DIASTOLIC BLOOD PRESSURE: 78 MMHG | SYSTOLIC BLOOD PRESSURE: 136 MMHG | RESPIRATION RATE: 16 BRPM | HEART RATE: 73 BPM

## 2025-04-28 DIAGNOSIS — C44.41 BASAL CELL CARCINOMA, SCALP/NECK: Primary | ICD-10-CM

## 2025-04-28 PROCEDURE — 13132 CMPLX RPR F/C/C/M/N/AX/G/H/F: CPT | Mod: S$GLB,,, | Performed by: DERMATOLOGY

## 2025-04-28 PROCEDURE — 17311 MOHS 1 STAGE H/N/HF/G: CPT | Mod: 51,S$GLB,, | Performed by: DERMATOLOGY

## 2025-04-28 NOTE — PATIENT INSTRUCTIONS
CARE OF WOUNDS WITH STITCHES     MATERIALS:  -hydrogen peroxide  -distilled water  -Q-tips  -Aquaphor Healing Ointment  -Telfa or similar non-stick/non-adhesive dressing pad  -Bandage tape (any bandage tape that works for you and your skin is okay to use)    -if interested in Cover Roll Stretch Bandage tape you may find it available at the following pharmacies: [facility] on [facility]; [facility] on [facility] on [facility] [place].      1. Keep the pressure dressing dry and in place for 24 hours. After 24 hours, you may resume your daily showers. You can shower with the dressing in place or remove it before showering. However, once the dressing gets wet it must be changed. PLEASE DO NOT LEAVE A WET DRESSING IN PLACE.  2. In a clean disposable cup, combine an even mixture of hydrogen peroxide and distilled water; use Q-tips to dip in the mixture and cleanse the incision line thoroughly. Be sure to remove any old or dried blood from around the incision line which could interfere with the healing process. Do not allow a thick crust or scab to form. Dry wound with Q-tip. Apply a thin film of Aquaphor Ointment over the incision line. Do this until the sutures are removed.   3. Use a piece of the non-stick/non-adherent dressing pad to cover the entire line of stitches. Then, use some bandage tape to cover the dressing pad completely, sealing air out on all sides of the wound.  4. Return to our clinic for your scheduled appointment to have your sutures removed. Sutures are normally removed 7-14 days after surgery depending on the location of treatment site.     IN CASE OF BLEEDING:  PLEASE DO NOT PANIC as this may increase bleeding.  Remove dressing, and clean the wound to see where bleeding is occurring and apply steady pressure with a clean cloth or gauze for 20 minutes (use a timer, if necessary). Let the timer run out before checking the area. If bleeding persists, repeat the 20  minutes of pressure. Ice or iced compresses may also be used. If these measures do not stop the bleeding, please call the office at [phone]. AFTER HOURS: please call [phone]. Do not take aspirin, aspirin containing medications, or Aleve, unless prescribed, for one week after surgery.     A small amount of redness is normal along any wound edge while it is healing. If, however, you experience intense pain at the site, increasing redness, discharge of pus, fever, or note a foul odor these are signs of infection and you should call the clinic at the above number.     REMINDER: Your dressing should be changed at least once a day. Use two extra strength Tylenol (Acetaminophen) and two Advil (Ibuprofen) together every six hours as you need to for mild pain. Do not drink alcoholic beverages while taking pain medication. No swimming or submerging the wound in bodies of water other than normal showering. Any questions about restrictions should be asked to medical staff and/or Dr. Antonio.

## 2025-04-28 NOTE — PROGRESS NOTES
Mohs Surgery Procedure Note    Name: Nan Mendez   YOB: 1975   Age: 49 y.o.  Date of Service: 04/28/2025  Surgeon: Enoc Antonio MD  Referring Provider:Margi Dahl M.D.  Assistants: Elaine Otto, Courtney Omer      Pre-operative Diagnosis: Basal cell carcinoma    Post-operative Diagnosis: Basal cell carcinoma    Pathological Diagnosis: Basal cell carcinoma    Locations: right  neck    Pre-operative Size: 0.9 x 0.7 cm    Indications: location    Anesthesia: 1% lidocaine with 1:100,000 epinephrine (1.5 cc)    Stages Performed: 1    Case Number: [id number]    Procedure Details     Patient informed of risks including pain, permanent scarring, infection, light or dark discoloration, bleeding, infection, weakness, numbness and recurrence of the lesion. Benefits of the procedure and written informed consent were obtained. Alternatives to Mohs surgery were discussed in detail with the patient. This includes: XRT, standard excision, electrodesiccation and curettage, cryotherapy and topicals.     Based on my medical judgement, Mohs surgery is the most appropriate treatment for this cancer compared to other treatments. The rationale for Mohs was explained to the patient. Prior to the procedure, treatment site was clearly identified and confirmed by the patient. Dr. Enoc Antonio operated in two distinct and integrated capacities as surgeon and pathologist.     Stage 1: The patient was placed on the operation room table. The lesion site was outlined with a tissue marking pen. The lesion and surrounding area was given a sterile prep using chlorhexidine and draped in the usual sterile fashion.  It was then infiltrated with local anesthesia. All gross tumor was debulked using curettage. An incision at a 45 degree angle following the standard Mohs approach was done and the specimen was harvested. Hemostasis was obtained by electrocoagulation. The specimen was oriented, mapped and divided into 2 blocks. Each  "section was then chromacoded and processed in the Mohs lab using the Mohs protocol and submitted for frozen section. Frozen section analysis showed: no residual tumor seen.  Surgical Defect Size: 2.0 x 1.0 cm  Depth of Final Defect: subcutis  Number of Slides: 6    Repair Note:  Procedure: Complex Wound Repair    Anesthetic: 1% lidocaine with 1:100,000 epinephrine (1.5 cc)     Surgeon: Dr. Enoc Antonio MD  Assistants: Elaine Otto CST, Courtney Omer    Indication: Status post Mohs' Micrographic Surgery for Basal cell carcinoma   Location: right neck  Defect Size: 2.0 x 1.0 cm    Procedure in Detail: After the patient's carcinoma had been completely removed with Mohs' Micrographic Surgery, a repair of the surgical defect was undertaken. The patient was returned to the operating suite where the area of the right neck was prepped, draped, and anesthetized in the usual sterile fashion. The wound was widely undermined in all directions to a distance of 1.3 cm. The width of the wound was 1.0 cm. Extensive wide undermining was required to redrape the area prior to closure.  The Mohs' bevel was surgically excised and electrodesiccation was used to obtain meticulous hemostasis. The undermined tissue was advanced into the primary defect and approximated with subcutaneous and intradermal buried vertical mattress of sutures of 5-0 PDO. The wound was then closed with a combination of simple interrupted and simple running sutures of 5-0 Novafil. Existing "dog ears" were repaired with a standard Burow's triangle technique. The patient tolerated the procedure well.    The area was cleaned and dressed appropriately and the patient was given wound care instructions, as well as an appointment for follow-up evaluation.     Length of Repair: 4.2 cm  EBL: Minimal  Complications: None      CLIA # [id number]    Plan:  1. Instructed to keep the wound dry and covered for 24-48h and clean thereafter.  2. The patient was prescribed no oral " antibiotics.  3. Warning signs of infection were reviewed.    4. Recommended that the patient use OTC acetaminophen and OTC ibuprofen as needed for pain.   5. Return in 10 days for suture removal.    A pre op    B post mohs    C post repair

## 2025-05-01 ENCOUNTER — CLINICAL SUPPORT (OUTPATIENT)
Dept: REHABILITATION | Facility: HOSPITAL | Age: 50
End: 2025-05-01
Payer: COMMERCIAL

## 2025-05-01 DIAGNOSIS — S49.91XA ARM INJURY, RIGHT, INITIAL ENCOUNTER: Primary | ICD-10-CM

## 2025-05-01 PROCEDURE — 97022 WHIRLPOOL THERAPY: CPT

## 2025-05-01 PROCEDURE — 97110 THERAPEUTIC EXERCISES: CPT

## 2025-05-01 PROCEDURE — 97140 MANUAL THERAPY 1/> REGIONS: CPT

## 2025-05-01 NOTE — PROGRESS NOTES
Outpatient Rehab    Occupational Therapy Visit    Patient Name: Nan Mendez  MRN: [id number]  YOB: 1975  Encounter Date: 5/1/2025    Therapy Diagnosis:   Encounter Diagnosis   Name Primary?    Arm injury, right, initial encounter Yes     Physician: Yomi Jones MD    Physician Orders: Eval and Treat  Medical Diagnosis: Pain in left hand  Unilateral primary osteoarthritis of first carpometacarpal joint, left hand    Visit # / Visits Authorized: 11 / 10  Insurance Authorization Period: 2/18/2025 to 12/31/2025    Date of Evaluation: 4/1/2025  Plan of Care Certification: 4/1/2025 to 6/27/2025     Time In: 0930   Time Out: 1020  Total Time: 50   Total Billable Time:      FOTO:  Intake Score:  %  Survey Score 1:  %  Survey Score 2:  %         Subjective   Sorry I missed earlier this week , I had a Mohs procedure on my neck..  Pain reported as 2/10. pain at base of CMC    Objective           Treatment:  Therapeutic Exercise  TE 1: thumb ROM all ways x 10 reps (2 sets), opposition thumb to small finger  TE 2: isospheres (2 min)  TE 3: wrist maze 3 min, blue wheel wrist flexion / extension, wire gyroscope  TE 4: in hand manipulation with coins  and foam block tip to tip  , finger ladder wheel 2 minutes  Modalities  Moist Heat (min): Pt received moist heat X 10 minutes  Fluidotherapy: Fluidotherapy: To involved hand for 10 min, continuous air, 115 deg, air speed 50 to decrease pain, edema & scar tissue, sensory re- education, and increased tissue extensibility prior to therex    Time Entry(in minutes):       Assessment & Plan   Assessment: pt has good motion, pain from last week has resolved , she sees MD next week , anticipate she will d/c splint next week( as she will be 6 weeks )       Patient will continue to benefit from skilled outpatient occupational therapy to address the deficits listed in the problem list box on initial evaluation, provide pt/family education and to maximize pt's  level of independence in the home and community environment.     Patient's spiritual, cultural, and educational needs considered and patient agreeable to plan of care and goals.     Education  Education was done with Patient.           Told pt that over this weekend that she can take splint off when doing sedentary task and she does not have to sleep with splint .         Plan: cont per original POC, start strengtheing at 6 weeks post op    Goals:    left CMC pain  Problems        left CMC pain  Problems (Active)       Functional outcome       Patient will show  increase in FOTO  by 10 pts patient-reported outcome tool to demonstrate subjective improvement (Progressing)       Start:  02/20/25    Expected End:  04/30/25               Hand and arm use        Patient will push open a door   (Progressing)       Start:  02/20/25    Expected End:  04/30/25               Home activities       Patient will cut food with knife in left hand  (Progressing)       Start:  02/20/25    Expected End:  04/30/25               Lifting & carrying objects       Patient will carry grocery bags and gallon of milk (Progressing)       Start:  02/20/25    Expected End:  04/30/25               Pain       Patient will report pain of 2/10 demonstrating a reduction of overall pain (Progressing)       Start:  02/20/25    Expected End:  04/30/25                 Outpatient Rehab - Rehab - Occupational Therapy - March 2025 Problems       Outpatient Rehab - Rehab - Occupational Therapy - March 2025 Problems (Active)       Pt will report improvement in FOTO score by 10-15 points        Pt will demo improved thumb ROM by 5-8 degrees in all planes of motion (Progressing)       Start:  04/04/25    Expected End:  06/27/25            Pt will report compliance with HEP and orthoses wear  (Progressing)       Start:  04/04/25    Expected End:  06/27/25            Pt will report pain level no greater than 1-2/10 with light activity  (Progressing)       Start:   04/04/25    Expected End:  06/27/25            Assess  and pinch when appropriate  (Progressing)       Start:  04/04/25    Expected End:  06/27/25                   Lilly Head, OT

## 2025-05-02 ENCOUNTER — TELEPHONE (OUTPATIENT)
Dept: ORTHOPEDICS | Facility: CLINIC | Age: 50
End: 2025-05-02
Payer: COMMERCIAL

## 2025-05-02 NOTE — TELEPHONE ENCOUNTER
Patient communication     Notified patient to stop at [place] [facility] - 1st floor check in [address] 30 minutes prior to your appointment time on 5/5/25 with Dr. Cohen for x-rays.     Made them aware that this is not a scheduled xray appointment and they might be running behind as they are considered a walk-in xray.    Verbalized the Following:  *Please arrive at your informed time above, if you are more than 15 Minutes late to your appointment with Dr. Cohen we will have to reschedule your appointment. This will allow you to be seen in a timely manor and be conscious to other patients being seen that same day*

## 2025-05-05 ENCOUNTER — OFFICE VISIT (OUTPATIENT)
Dept: ORTHOPEDICS | Facility: CLINIC | Age: 50
End: 2025-05-05
Payer: COMMERCIAL

## 2025-05-05 ENCOUNTER — HOSPITAL ENCOUNTER (OUTPATIENT)
Dept: RADIOLOGY | Facility: HOSPITAL | Age: 50
Discharge: HOME | End: 2025-05-05
Attending: ORTHOPAEDIC SURGERY
Payer: COMMERCIAL

## 2025-05-05 DIAGNOSIS — Z98.890 POSTOPERATIVE STATE: ICD-10-CM

## 2025-05-05 DIAGNOSIS — M18.12 PRIMARY OSTEOARTHRITIS OF FIRST CARPOMETACARPAL JOINT OF LEFT HAND: ICD-10-CM

## 2025-05-05 DIAGNOSIS — M18.12 PRIMARY OSTEOARTHRITIS OF FIRST CARPOMETACARPAL JOINT OF LEFT HAND: Primary | ICD-10-CM

## 2025-05-05 PROCEDURE — 99024 POSTOP FOLLOW-UP VISIT: CPT | Mod: S$GLB,,, | Performed by: ORTHOPAEDIC SURGERY

## 2025-05-05 PROCEDURE — 3044F HG A1C LEVEL LT 7.0%: CPT | Mod: CPTII,S$GLB,, | Performed by: ORTHOPAEDIC SURGERY

## 2025-05-05 PROCEDURE — 3066F NEPHROPATHY DOC TX: CPT | Mod: CPTII,S$GLB,, | Performed by: ORTHOPAEDIC SURGERY

## 2025-05-05 PROCEDURE — 99999 PR PBB SHADOW E&M-EST. PATIENT-LVL III: CPT | Mod: PBBFAC,,, | Performed by: ORTHOPAEDIC SURGERY

## 2025-05-05 PROCEDURE — 1159F MED LIST DOCD IN RCRD: CPT | Mod: CPTII,S$GLB,, | Performed by: ORTHOPAEDIC SURGERY

## 2025-05-05 PROCEDURE — 73130 X-RAY EXAM OF HAND: CPT | Mod: 26,LT,, | Performed by: INTERNAL MEDICINE

## 2025-05-05 PROCEDURE — 1160F RVW MEDS BY RX/DR IN RCRD: CPT | Mod: CPTII,S$GLB,, | Performed by: ORTHOPAEDIC SURGERY

## 2025-05-05 PROCEDURE — 73130 X-RAY EXAM OF HAND: CPT | Mod: TC,LT

## 2025-05-05 PROCEDURE — 3061F NEG MICROALBUMINURIA REV: CPT | Mod: CPTII,S$GLB,, | Performed by: ORTHOPAEDIC SURGERY

## 2025-05-05 NOTE — PROGRESS NOTES
Nan Mendez presents for post-operative evaluation of   Encounter Diagnoses   Name Primary?    Primary osteoarthritis of first carpometacarpal joint of left hand Yes    Postoperative state    . The patient is now 6 weeks 4 days s/p 3/20/25 Left thumb cmc arthroplasty, loose body removal.  Overall the patient reports doing well.  The patient reports appropriate postoperative soreness with well controlled overall pain.      She can now dry her hair independently, which was previously challenging and required assistance from her  or mother.  She reports an incident where she peeled crawfish and had soreness in the thumb the next day.    Vitals:    05/05/25 1010   PainSc: 0-No pain   PainLoc: Hand       PE:    AA&O x 4.  NAD  HEENT:  NCAT, sclera nonicteric  Lungs:  Respirations are equal and unlabored.  CV:  2+ bilateral upper and lower extremity pulses.  MSK:     The incision is well healed.  Full thumb CMC ROM, Full finger motion.  Neurovascularly intact and has 5/5 thenar and intrinsic musculature strength.       Diagnostic studies and other clinical records review:  X-rays AP, lateral and oblique left hand taken today are independently reviewed by me and shows good alignment s/p left trapeziectomy.    Assessment & Plan:      Status post above, doing well  Continue with range of motion; strengthening at 6 weeks  F/U 6 weeks   Call with any questions/concerns in the interim        Isidra Cohen MD    Please be aware that this note has been generated with the assistance of [employer] voice-to-text.  Please excuse any spelling or grammatical errors.  This note was generated with the assistance of ambient listening technology. Verbal consent was obtained by the patient and accompanying visitor(s) for the recording of patient appointment to facilitate this note. I attest to having reviewed and edited the generated note for accuracy, though some syntax or spelling errors may persist. Please contact the  author of this note for any clarification.

## 2025-05-06 ENCOUNTER — CLINICAL SUPPORT (OUTPATIENT)
Dept: REHABILITATION | Facility: HOSPITAL | Age: 50
End: 2025-05-06
Payer: COMMERCIAL

## 2025-05-06 DIAGNOSIS — M25.642 DECREASED RANGE OF MOTION OF LEFT THUMB: Primary | ICD-10-CM

## 2025-05-06 PROCEDURE — 97022 WHIRLPOOL THERAPY: CPT

## 2025-05-06 PROCEDURE — 97110 THERAPEUTIC EXERCISES: CPT

## 2025-05-06 NOTE — PROGRESS NOTES
Outpatient Rehab    Occupational Therapy Visit    Patient Name: Nan Mendez  MRN: [id number]  YOB: 1975  Encounter Date: 5/6/2025    Therapy Diagnosis:   Encounter Diagnosis   Name Primary?    Decreased range of motion of left thumb Yes     Physician: Yomi Jones MD    Physician Orders: Eval and Treat  Medical Diagnosis: Pain in left hand  Unilateral primary osteoarthritis of first carpometacarpal joint, left hand    Visit # / Visits Authorized: 12 / 20  Insurance Authorization Period: 2/18/2025 to 12/31/2025    Date of Evaluation: 4/1/2025  Plan of Care Certification: 4/1/2025 to 6/27/2025       Time In:   9:30 am   Time Out:   10:30 am   Total Time (in minutes):     Total Billable Time (in minutes):      FOTO:  Intake Score: 45%  Survey Score 2:  %  Survey Score 3:  %         Subjective   I saw Dr. patrick and she was pleased with my motion and now I can start strengthening.  Pain reported as 2/10.      Objective            Treatment:  Therapeutic Exercise  TE 1: thumb ROM all ways x 10 reps (2 sets), opposition thumb to small finger  TE 2: isospheres (2 min)  TE 3: wrist maze 3 min, blue wheel wrist flexion / extension, wire gyroscope  TE 4: in hand manipulation with coins  and foam block tip to tip  , finger ladder wheel  with weight 2 minutes  TE 5: yellow clothes pin , 3 pt and lateral pinch with pom poms  TE 6: keypin pegboard with yellow clothes pin  Modalities  Fluidotherapy: Fluidotherapy: To involved hand for 10 min, continuous air, 115 deg, air speed 50 to decrease pain, edema & scar tissue, sensory re- education, and increased tissue extensibility prior to therex    Time Entry(in minutes):       Assessment & Plan   Assessment: pt has good motion,able to tolerate strengthening no issues today       Patient will continue to benefit from skilled outpatient occupational therapy to address the deficits listed in the problem list box on initial evaluation, provide pt/family  education and to maximize pt's level of independence in the home and community environment.     Patient's spiritual, cultural, and educational needs considered and patient agreeable to plan of care and goals.           Plan:      Goals:    left CMC pain  Problems        left CMC pain  Problems (Active)       Functional outcome       Patient will show  increase in FOTO  by 10 pts patient-reported outcome tool to demonstrate subjective improvement (Progressing)       Start:  02/20/25    Expected End:  04/30/25               Hand and arm use        Patient will push open a door   (Progressing)       Start:  02/20/25    Expected End:  04/30/25               Home activities       Patient will cut food with knife in left hand  (Progressing)       Start:  02/20/25    Expected End:  04/30/25               Lifting & carrying objects       Patient will carry grocery bags and gallon of milk (Progressing)       Start:  02/20/25    Expected End:  04/30/25               Pain       Patient will report pain of 2/10 demonstrating a reduction of overall pain (Progressing)       Start:  02/20/25    Expected End:  04/30/25                 Outpatient Rehab - Rehab - Occupational Therapy - March 2025 Problems       Outpatient Rehab - Rehab - Occupational Therapy - March 2025 Problems (Active)       Pt will report improvement in FOTO score by 10-15 points        Pt will demo improved thumb ROM by 5-8 degrees in all planes of motion (Progressing)       Start:  04/04/25    Expected End:  06/27/25            Pt will report compliance with HEP and orthoses wear  (Progressing)       Start:  04/04/25    Expected End:  06/27/25            Pt will report pain level no greater than 1-2/10 with light activity  (Progressing)       Start:  04/04/25    Expected End:  06/27/25            Assess  and pinch when appropriate  (Progressing)       Start:  04/04/25    Expected End:  06/27/25                   Lilly Head, OT

## 2025-05-08 ENCOUNTER — OFFICE VISIT (OUTPATIENT)
Dept: DERMATOLOGY | Facility: CLINIC | Age: 50
End: 2025-05-08
Payer: COMMERCIAL

## 2025-05-08 DIAGNOSIS — Z48.817 AFTERCARE FOLLOWING SURGERY OF THE SKIN OR SUBCUTANEOUS TISSUE: Primary | ICD-10-CM

## 2025-05-08 PROCEDURE — 99999 PR PBB SHADOW E&M-EST. PATIENT-LVL II: CPT | Mod: PBBFAC,,, | Performed by: DERMATOLOGY

## 2025-05-08 PROCEDURE — 1160F RVW MEDS BY RX/DR IN RCRD: CPT | Mod: CPTII,S$GLB,, | Performed by: DERMATOLOGY

## 2025-05-08 PROCEDURE — 1159F MED LIST DOCD IN RCRD: CPT | Mod: CPTII,S$GLB,, | Performed by: DERMATOLOGY

## 2025-05-08 PROCEDURE — 3066F NEPHROPATHY DOC TX: CPT | Mod: CPTII,S$GLB,, | Performed by: DERMATOLOGY

## 2025-05-08 PROCEDURE — 3061F NEG MICROALBUMINURIA REV: CPT | Mod: CPTII,S$GLB,, | Performed by: DERMATOLOGY

## 2025-05-08 PROCEDURE — 99024 POSTOP FOLLOW-UP VISIT: CPT | Mod: S$GLB,,, | Performed by: DERMATOLOGY

## 2025-05-08 PROCEDURE — 3044F HG A1C LEVEL LT 7.0%: CPT | Mod: CPTII,S$GLB,, | Performed by: DERMATOLOGY

## 2025-05-08 NOTE — PROGRESS NOTES
49 y.o. female patient is here for suture removal following Mohs surgery to remove basal cell carcinoma on the right neck with complex layered repair 10 days ago.    Patient reports no problems.    MEDICATIONS:  See list.    Review of patient's allergies indicates:  No Known Allergies    Review of Systems    WOUND PE:   The right neck sutures intact. Sutures intact. Wound healing well. No signs or symptoms of infection.    IMPRESSION:  Healing operative site from Mohs surgery to remove a basal cell carcinoma on the right neck with complex layered repair 10 days ago.    PLAN:  Sutures removed today by Courtney Omer. Steri-strips applied.  Apply Aquaphor twice daily for the next 2-3 months. Use SPF to protect the area.    RTC:  PRN or sooner if any concerns or problems with the treated area arise. Otherwise, follow up with Margi Dahl M.D.      10 days post mohs

## 2025-05-13 ENCOUNTER — CLINICAL SUPPORT (OUTPATIENT)
Dept: REHABILITATION | Facility: HOSPITAL | Age: 50
End: 2025-05-13
Payer: COMMERCIAL

## 2025-05-13 DIAGNOSIS — M25.642 DECREASED RANGE OF MOTION OF LEFT THUMB: Primary | ICD-10-CM

## 2025-05-13 PROCEDURE — 97022 WHIRLPOOL THERAPY: CPT

## 2025-05-13 PROCEDURE — 97110 THERAPEUTIC EXERCISES: CPT

## 2025-05-13 PROCEDURE — 97530 THERAPEUTIC ACTIVITIES: CPT

## 2025-05-13 PROCEDURE — 97140 MANUAL THERAPY 1/> REGIONS: CPT

## 2025-05-13 NOTE — PROGRESS NOTES
Outpatient Rehab    Occupational Therapy Visit    Patient Name: Nan Mendez  MRN: [id number]  YOB: 1975  Encounter Date: 5/13/2025    Therapy Diagnosis:   Encounter Diagnosis   Name Primary?    Decreased range of motion of left thumb Yes     Physician: Yomi Jones MD    Physician Orders: Eval and Treat  Medical Diagnosis: Pain in left hand  Unilateral primary osteoarthritis of first carpometacarpal joint, left hand    Visit # / Visits Authorized: 13 / 20  Insurance Authorization Period: 2/18/2025 to 12/31/2025  Date of Evaluation: 2/20/2025  3/19/2025  3/31/2025  Plan of Care Certification: 2/20/2025 to 4/30/2025 4/4/2025 to 6/27/2025      Time In:     Time Out:    Total Time (in minutes):     Total Billable Time (in minutes):      FOTO:  Intake Score: 45%  Survey Score 2:  %  Survey Score 3:  %    Precautions:       Subjective   she has no pain and feels that she is doing well with motion and strength is getting better ..    pain at base of CMC    Objective            Treatment:  Therapeutic Exercise  TE 1: thumb ROM all ways x 10 reps (2 sets), opposition thumb to small finger  TE 2: isospheres (2 min)  TE 3: wrist maze 3 min, blue wheel wrist flexion / extension, wire gyroscope  TE 4: in hand manipulation with coins  and foam block tip to tip  , finger ladder wheel  with weight 2 minutes  TE 5: yellow clothes pin , 3 pt and lateral pinch with pom poms  TE 6: keypin pegboard with yellow clothes pin  Therapeutic Activity  TA 1: yellow clothespin pom pom pick ups,  hand gripper 4 yellow rubber band , yellow log piches ,  thumb ladder wheel with weight  TA 2: thumb exerciser with 1 YRB  30 reps  Modalities  Fluidotherapy: Fluidotherapy: To involved hand for 10 min, continuous air, 115 deg, air speed 50 to decrease pain, edema & scar tissue, sensory re- education, and increased tissue extensibility prior to therex    Time Entry(in minutes):       Assessment & Plan   Assessment: pt has  good motion,able to tolerate strengthening no issues today       Patient will continue to benefit from skilled outpatient occupational therapy to address the deficits listed in the problem list box on initial evaluation, provide pt/family education and to maximize pt's level of independence in the home and community environment.     Patient's spiritual, cultural, and educational needs considered and patient agreeable to plan of care and goals.           Plan: cont per original POC,  continue strengtheing at 6 weeks post op    Goals:    left CMC pain  Problems        left CMC pain  Problems (Active)       Functional outcome       Patient will show  increase in FOTO  by 10 pts patient-reported outcome tool to demonstrate subjective improvement (Progressing)       Start:  02/20/25    Expected End:  04/30/25               Hand and arm use        Patient will push open a door   (Progressing)       Start:  02/20/25    Expected End:  04/30/25               Home activities       Patient will cut food with knife in left hand  (Progressing)       Start:  02/20/25    Expected End:  04/30/25               Lifting & carrying objects       Patient will carry grocery bags and gallon of milk (Progressing)       Start:  02/20/25    Expected End:  04/30/25               Pain       Patient will report pain of 2/10 demonstrating a reduction of overall pain (Progressing)       Start:  02/20/25    Expected End:  04/30/25                 Outpatient Rehab - Rehab - Occupational Therapy - March 2025 Problems       Outpatient Rehab - Rehab - Occupational Therapy - March 2025 Problems (Active)       Pt will report improvement in FOTO score by 10-15 points        Pt will demo improved thumb ROM by 5-8 degrees in all planes of motion (Progressing)       Start:  04/04/25    Expected End:  06/27/25            Pt will report compliance with HEP and orthoses wear  (Progressing)       Start:  04/04/25    Expected End:  06/27/25            Pt will  report pain level no greater than 1-2/10 with light activity  (Progressing)       Start:  04/04/25    Expected End:  06/27/25            Assess  and pinch when appropriate  (Progressing)       Start:  04/04/25    Expected End:  06/27/25                   Lilly Head, OT

## 2025-05-20 ENCOUNTER — CLINICAL SUPPORT (OUTPATIENT)
Dept: REHABILITATION | Facility: HOSPITAL | Age: 50
End: 2025-05-20
Payer: COMMERCIAL

## 2025-05-20 DIAGNOSIS — M25.642 DECREASED RANGE OF MOTION OF LEFT THUMB: Primary | ICD-10-CM

## 2025-05-20 PROCEDURE — 97140 MANUAL THERAPY 1/> REGIONS: CPT

## 2025-05-20 PROCEDURE — 97022 WHIRLPOOL THERAPY: CPT

## 2025-05-20 PROCEDURE — 97110 THERAPEUTIC EXERCISES: CPT

## 2025-05-20 NOTE — PROGRESS NOTES
Outpatient Rehab    Occupational Therapy Visit    Patient Name: Nan Mendez  MRN: [id number]  YOB: 1975  Encounter Date: 5/20/2025    Therapy Diagnosis:   Encounter Diagnosis   Name Primary?    Decreased range of motion of left thumb Yes     Physician: Yomi Jones MD    Physician Orders: Eval and Treat  Medical Diagnosis: Pain in left hand  Unilateral primary osteoarthritis of first carpometacarpal joint, left hand    Visit # / Visits Authorized: 14 / 20  Insurance Authorization Period: 2/18/2025 to 12/31/2025  Date of Evaluation: 3/31/2025  Plan of Care Certification: 4/4/2025 to 6/27/2025      Time In: 0930   Time Out: 1020  Total Time (in minutes): 50   Total Billable Time (in minutes):      FOTO:  Intake Score:  %  Survey Score 2:  %  Survey Score 3:  %    Precautions:       Subjective   she has no pain and feels that she is doing well with motion and strength is getting better ..    occasional sore after therapy    Objective            Treatment:  Therapeutic Exercise  TE 1: thumb ROM all ways x 10 reps (2 sets), opposition thumb to small finger  TE 2: isospheres (2 min)  TE 3: wrist maze 3 min, blue wheel wrist flexion / extension, wire gyroscope  TE 4: in hand manipulation with coins  and foam block tip to tip  , finger ladder wheel  with weight 2 minutes  TE 5: yellow clothes pin , 3 pt and lateral pinch with pom poms  TE 6: keypin pegboard with yellow clothes pin  Therapeutic Activity  TA 1: yellow clothespin pom pom pick ups,  hand gripper 4 yellow rubber band , yellow log piches ,  thumb ladder wheel with weight  TA 2: thumb exerciser with 1 YRB  30 reps  Modalities  Fluidotherapy: Fluidotherapy: To involved hand for 10 min, continuous air, 115 deg, air speed 50 to decrease pain, edema & scar tissue, sensory re- education, and increased tissue extensibility prior to therex    Time Entry(in minutes):       Assessment & Plan   Assessment: pt has good motion,able to tolerate  strengthening no issues today       Patient will continue to benefit from skilled outpatient occupational therapy to address the deficits listed in the problem list box on initial evaluation, provide pt/family education and to maximize pt's level of independence in the home and community environment.     Patient's spiritual, cultural, and educational needs considered and patient agreeable to plan of care and goals.           Plan: cont per original POC,  continue strengtheing at 6 weeks post op    Goals:   Active       Strength       Patient will achieve left  strength of left  in the two position       Start:  05/20/25    Expected End:  06/30/25                Lilly Head OT

## 2025-06-08 DIAGNOSIS — E03.9 ACQUIRED HYPOTHYROIDISM: ICD-10-CM

## 2025-06-09 ENCOUNTER — OFFICE VISIT (OUTPATIENT)
Dept: DERMATOLOGY | Facility: CLINIC | Age: 50
End: 2025-06-09
Payer: COMMERCIAL

## 2025-06-09 DIAGNOSIS — L91.8 SKIN TAG: ICD-10-CM

## 2025-06-09 DIAGNOSIS — D22.9 MULTIPLE BENIGN NEVI: ICD-10-CM

## 2025-06-09 DIAGNOSIS — D18.01 CHERRY ANGIOMA: ICD-10-CM

## 2025-06-09 DIAGNOSIS — Z85.828 PERSONAL HISTORY OF SKIN CANCER: ICD-10-CM

## 2025-06-09 DIAGNOSIS — L81.4 LENTIGINES: ICD-10-CM

## 2025-06-09 DIAGNOSIS — L82.1 SEBORRHEIC KERATOSES: ICD-10-CM

## 2025-06-09 DIAGNOSIS — L90.5 SCAR: ICD-10-CM

## 2025-06-09 DIAGNOSIS — D48.5 NEOPLASM OF UNCERTAIN BEHAVIOR OF SKIN: Primary | ICD-10-CM

## 2025-06-09 DIAGNOSIS — D22.39 FIBROUS PAPULE OF NOSE: ICD-10-CM

## 2025-06-09 PROCEDURE — 99999 PR PBB SHADOW E&M-EST. PATIENT-LVL III: CPT | Mod: PBBFAC,,, | Performed by: DERMATOLOGY

## 2025-06-09 PROCEDURE — 3044F HG A1C LEVEL LT 7.0%: CPT | Mod: CPTII,S$GLB,, | Performed by: DERMATOLOGY

## 2025-06-09 PROCEDURE — 1160F RVW MEDS BY RX/DR IN RCRD: CPT | Mod: CPTII,S$GLB,, | Performed by: DERMATOLOGY

## 2025-06-09 PROCEDURE — 3061F NEG MICROALBUMINURIA REV: CPT | Mod: CPTII,S$GLB,, | Performed by: DERMATOLOGY

## 2025-06-09 PROCEDURE — 88305 TISSUE EXAM BY PATHOLOGIST: CPT | Mod: TC | Performed by: DERMATOLOGY

## 2025-06-09 PROCEDURE — 3066F NEPHROPATHY DOC TX: CPT | Mod: CPTII,S$GLB,, | Performed by: DERMATOLOGY

## 2025-06-09 PROCEDURE — 11102 TANGNTL BX SKIN SINGLE LES: CPT | Mod: S$GLB,,, | Performed by: DERMATOLOGY

## 2025-06-09 PROCEDURE — 99213 OFFICE O/P EST LOW 20 MIN: CPT | Mod: 25,S$GLB,, | Performed by: DERMATOLOGY

## 2025-06-09 PROCEDURE — 1159F MED LIST DOCD IN RCRD: CPT | Mod: CPTII,S$GLB,, | Performed by: DERMATOLOGY

## 2025-06-09 RX ORDER — SULFAMETHOXAZOLE AND TRIMETHOPRIM 800; 160 MG/1; MG/1
60 TABLET ORAL
Qty: 90 TABLET | Refills: 3 | Status: SHIPPED | OUTPATIENT
Start: 2025-06-09

## 2025-06-09 NOTE — PATIENT INSTRUCTIONS
Use neova body repair to chest qd- bid; spf 30 + mineral sunscreen to chest in the am   Use biocorneum scar gel to surgical site x 2 months or other silicone based scar gel     We would like to see you back in the clinic in 6 months.  You will be able to schedule this appointment by calling or by using your [facility] portal 3 months before this time. Because our schedule fills so quickly, please set a reminder in your phone or on your calendar to schedule 3 months before you are due to come in so that we can see you in a timely fashion.  You should also receive a reminder from us in the mail. This will help us ensure we can continue to provide excellent healthcare for you. Thank you.

## 2025-06-09 NOTE — PROGRESS NOTES
Subjective:      Patient ID:  Nan Mendez is a 49 y.o. female who presents for   Chief Complaint   Patient presents with    Skin Check     tbse       History of blistering sunburns: No  History of tanning bed use: No  Immunosuppressing medications or conditions: No  Family history of melanoma: No  Personal history of mole removal: Yes  Personal history of skin cancer: yes left upper back     Pt has h/o NMSC - bcc RIGHT LOWER neck tx with mohs by Dr Antonio     Final Diagnosis       Date                     Value               Ref Range           Status                03/27/2025                                                       Final             1. Skin, right lower neck, shave biopsy:  - BASAL CELL CARCINOMA, SUPERFICIAL TYPE. - MARGINS ARE NEGATIVE IN THE PLANES OF SECTION EXAMINED. This lesion is skin cancer.  You will be contacted by your provider's  office regarding treatment. [FORMATTING REMOVED]  ----------            pT has no new concerns today.       Review of Systems   Skin:  Positive for daily sunscreen use, activity-related sunscreen use and wears hat. Negative for recent sunburn.   Hematologic/Lymphatic: Does not bruise/bleed easily.       Objective:   Physical Exam   Constitutional: She appears well-developed and well-nourished. No distress.   Neurological: She is alert and oriented to person, place, and time. She is not disoriented.   Psychiatric: She has a normal mood and affect.   Skin:   Areas Examined (abnormalities noted in diagram):   Scalp / Hair Palpated and Inspected  Head / Face Inspection Performed  Neck Inspection Performed  Chest / Axilla Inspection Performed  Abdomen Inspection Performed  Genitals / Buttocks / Groin Inspection Performed  Back Inspection Performed  RUE Inspected  LUE Inspection Performed  RLE Inspected  LLE Inspection Performed  Nails and Digits Inspection Performed                               Diagram Legend     Erythematous scaling macule/papule c/w  actinic keratosis       Vascular papule c/w angioma      Pigmented verrucoid papule/plaque c/w seborrheic keratosis      Yellow umbilicated papule c/w sebaceous hyperplasia      Irregularly shaped tan macule c/w lentigo     1-2 mm smooth white papules consistent with Milia      Movable subcutaneous cyst with punctum c/w epidermal inclusion cyst      Subcutaneous movable cyst c/w pilar cyst      Firm pink to brown papule c/w dermatofibroma      Pedunculated fleshy papule(s) c/w skin tag(s)      Evenly pigmented macule c/w junctional nevus     Mildly variegated pigmented, slightly irregular-bordered macule c/w mildly atypical nevus      Flesh colored to evenly pigmented papule c/w intradermal nevus       Pink pearly papule/plaque c/w basal cell carcinoma      Erythematous hyperkeratotic cursted plaque c/w SCC      Surgical scar with no sign of skin cancer recurrence      Open and closed comedones      Inflammatory papules and pustules      Verrucoid papule consistent consistent with wart     Erythematous eczematous patches and plaques     Dystrophic onycholytic nail with subungual debris c/w onychomycosis     Umbilicated papule    Erythematous-base heme-crusted tan verrucoid plaque consistent with inflamed seborrheic keratosis     Erythematous Silvery Scaling Plaque c/w Psoriasis     See annotation      Assessment / Plan:      Pathology Orders:       Normal Orders This Visit    Specimen to Pathology, Dermatology     Questions:    Procedure Type: Dermatology and skin neoplasms    Number of Specimens: 1    ------------------------: -------------------------    Spec 1 Procedure: Shave Biopsy    Spec 1 Clinical Impression: r/o BCC    Spec 1 Source: left upper back    Clinical Information: see EPIC    Clinical History: see EPIC    Specimen Source: Skin    Release to patient: Immediate    Send normal result to authorizing provider's In Basket if patient is active on MyChart: Yes          Neoplasm of uncertain behavior of  skin  Shave biopsy procedure note:    Shave biopsy performed after verbal consent including risk of infection, scar, recurrence, need for additional treatment of site. Area prepped with alcohol, anesthetized with approximately 1.0cc of 1% lidocaine with epinephrine. Lesional tissue shaved with razor blade. Hemostasis achieved with application of aluminum chloride followed by hyfrecation. No complications. Dressing applied. Wound care explained.  If biopsy positive, schedule procedure for electrodessication and curretage    -     Specimen to Pathology, Dermatology    Skin tag  Reassurance given to patient. No treatment is necessary.   Treatment of benign, asymptomatic lesions may be considered cosmetic.    Fibrous papule of nose  Reassurance given to patient. No treatment is necessary.   Treatment of benign, asymptomatic lesions may be considered cosmetic.    Multiple benign nevi  Discussed ABCDE's of nevi.  Monitor for new mole or moles that are becoming bigger, darker, irritated, or developing irregular borders. Brochure provided. Instructed patient to observe lesion(s) for changes and follow up in clinic if changes are noted. Patient to monitor skin at home for new or changing lesions.     Seborrheic keratoses  These are benign inherited growths without a malignant potential. Reassurance given to patient. No treatment is necessary.     Lentigines  This is a benign hyperpigmented sun induced lesion. Recommend daily sun protection/avoidance and use of at least SPF 30, broad spectrum sunscreen (OTC drug) will reduce the number of new lesions. Treatment of these benign lesions are considered cosmetic.  The nature of sun-induced photo-aging and skin cancers is discussed.  Sun avoidance, protective clothing, and the use of 30-SPF sunscreens is advised. Observe closely for skin damage/changes, and call if such occurs.    Cherry angioma  These are benign vascular lesions that are inherited.  Treatment is not  necessary.    Scar  Silicone based scar gel such as biocorneum recommended    Personal history of skin cancer  Patient with a history of non melanoma skin cancer.   Total body skin examination performed today including at least 12 points as noted in physical examination. Suspicious lesions noted.    Solar/xrt dermatitis- neck and chest- Neova Body repair qd- bid            Follow up in about 6 months (around 12/9/2025) for prn bx report, TBSE.

## 2025-06-10 ENCOUNTER — CLINICAL SUPPORT (OUTPATIENT)
Dept: REHABILITATION | Facility: HOSPITAL | Age: 50
End: 2025-06-10
Payer: COMMERCIAL

## 2025-06-10 DIAGNOSIS — M25.642 DECREASED RANGE OF MOTION OF LEFT THUMB: Primary | ICD-10-CM

## 2025-06-10 PROCEDURE — 97530 THERAPEUTIC ACTIVITIES: CPT

## 2025-06-10 PROCEDURE — 97022 WHIRLPOOL THERAPY: CPT

## 2025-06-10 PROCEDURE — 97110 THERAPEUTIC EXERCISES: CPT

## 2025-06-10 NOTE — PROGRESS NOTES
Outpatient Rehab    Occupational Therapy Visit    Patient Name: Nan Mendez  MRN: [id number]  YOB: 1975  Encounter Date: 6/10/2025    Therapy Diagnosis:   Encounter Diagnosis   Name Primary?    Decreased range of motion of left thumb Yes     Physician: Yomi Jones MD    Physician Orders: Eval and Treat  Medical Diagnosis: Pain in left hand  Unilateral primary osteoarthritis of first carpometacarpal joint, left hand  Surgical Diagnosis: L CMC interposition arthoplasty   Surgical Date: Not applicable for this Episode    Visit # / Visits Authorized: 15 / 20  Insurance Authorization Period: 2/18/2025 to 12/31/2025  Date of Evaluation: 3/31/2025  Plan of Care Certification: 4/4/2025 to 6/27/2025      Time In: 0930   Time Out: 1030  Total Time (in minutes): 60   Total Billable Time (in minutes):      FOTO:  Intake Score:  %  Survey Score 2:  %  Survey Score 3:  %    Precautions:       Subjective   no pain , strength not full yet, she had to miss last 2 weeks due to vacation..    occasional sore after therapy    Objective        Left  Strength  Left Hand Dynamometer Position: 2  Elbow Position Forearm Position Trial 1 (lbs) Trial 2 (lbs) Trial 3 (lbs) Average (lbs) Pain   Flexed Neutral 28 28 26 27.33         Left Pinch Strength   Trial 1 (lbs) Trial 2 (lbs) Trial 3 (lbs) Average (lbs) Pain   Lateral (Key Pinch) 3 3 3 3     Three Point (Three Jaw Aaron) 4 4 4 4     Two Point (Tip to Tip) 3 3 3 3                    Treatment:  Therapeutic Exercise  TE 1: thumb ROM all ways x 10 reps (2 sets), opposition thumb to small finger  TE 2: isospheres (2 min)  TE 3: wrist maze 3 min, blue wheel wrist flexion / extension, wire gyroscope  TE 4: in hand manipulation with coins  and foam block tip to tip  , finger ladder wheel  with weight 2 minutes  TE 5: yellow clothes pin , 3 pt and lateral pinch with pom poms  TE 6: keypin pegboard with yellow clothes pin  Therapeutic Activity  TA 1: yellow  clothespin pom pom pick ups,  hand gripper 4 yellow rubber band , yellow log piches ,  thumb ladder wheel with weight  TA 2: thumb exerciser with 1 YRB  30 reps  TA 3: issued  a red clothe spin for lateral and 3pt pinch 20 reps daily ,stop if pain greater then 5/10  Modalities  Fluidotherapy: Fluidotherapy: To involved hand for 10 min, continuous air, 115 deg, air speed 50 to decrease pain, edema & scar tissue, sensory re- education, and increased tissue extensibility prior to therex    Time Entry(in minutes):       Assessment & Plan   Assessment: pt has good motion,able to tolerate strengthening no issues today, measurements indicate a drop in pinch strength , but she has been on vacation for last 2 weeks , issued red clothes pin for HEP       The patient will continue to benefit from skilled outpatient occupational therapy in order to address the deficits listed in the problem list on the initial evaluation, provide patient and family education, and maximize the patients level of independence in the home and community environments.     The patient's spiritual, cultural, and educational needs were considered, and the patient is agreeable to the plan of care and goals.           Plan: continue strengthening    Goals:   Active       Strength       Patient will achieve left  strength of left  in the two position (Progressing)       Start:  05/20/25    Expected End:  06/30/25                Lilly Head OT

## 2025-06-11 ENCOUNTER — RESULTS FOLLOW-UP (OUTPATIENT)
Dept: DERMATOLOGY | Facility: CLINIC | Age: 50
End: 2025-06-11

## 2025-06-11 LAB
ESTROGEN SERPL-MCNC: NORMAL PG/ML
INSULIN SERPL-ACNC: NORMAL U[IU]/ML
LAB AP CLINICAL INFORMATION: NORMAL
LAB AP GROSS DESCRIPTION: NORMAL
LAB AP REPORT FOOTNOTES: NORMAL
T3RU NFR SERPL: NORMAL %

## 2025-06-11 NOTE — PROGRESS NOTES
Total Care Appointment    Subjective:      Patient ID: Nan Mendez is a 49 y.o. female.    Chief Complaint: Follow-up      HPI:    History of Present Illness    CHIEF COMPLAINT:  Nan presents today with concerns about elevated blood pressure and fatigue.    BLOOD PRESSURE:  Home blood pressure readings have been elevated with systolic pressures in the low 130s (specific readings: 126/83 and 125/70) compared to her usual readings in the 120s. Diastolic pressures remain within normal range.    FATIGUE AND SLEEP:  She reports significant fatigue and exhaustion affecting her daily activities, including difficulty with mobility. She reports poor sleep quality with night sweats and hot flashes exclusively during nighttime, requiring adjustment of home temperature settings. She denies prior use of sleep medication.    MEDICAL HISTORY:  She has a history of Hashimoto's disease and recent basal cell carcinoma removal with visible scarring. A new basal cell lesion has been identified on her back. She has had a hysterectomy with ovaries intact.    LABS:  TSH was 0.9, hemoglobin 13.6, and CMP were all within normal range.    WEIGHT AND EXERCISE:  She reports gradual weight gain despite maintaining regular treadmill walking at home and healthy eating habits with minimal processed foods.    HAIR:  She reports cyclical hair loss and thinning with episodes of significant shedding occurring approximately one year ago, followed by periods of improvement.    MENTAL HEALTH:  She denies depression but acknowledges stress related to children. She attributes symptoms to exhaustion rather than depressive mood.      ROS:  General: no fever, no chills, reports fatigue, reports weight gain, no weight loss, reports hot flashes, reports night sweats, reports sleep disturbances  Eyes: no vision changes, no redness, no discharge  ENT: no ear pain, no nasal congestion, no sore throat  Cardiovascular: no chest pain, no palpitations, no  lower extremity edema  Respiratory: no cough, no shortness of breath  Gastrointestinal: no abdominal pain, no nausea, no vomiting, no diarrhea, no constipation, no blood in stool  Genitourinary: no dysuria, no hematuria, no frequency  Musculoskeletal: no joint pain, no muscle pain  Skin: no rash, no lesion, reports abnormal hair loss  Neurological: no headache, no dizziness, no numbness, no tingling  Psychiatric: no anxiety, no depression, no sleep difficulty         Lab Results   Component Value Date    TSH 0.896 03/28/2025     Lab Results   Component Value Date    WBC 5.87 03/14/2025    HGB 13.6 03/14/2025    HCT 43.2 03/14/2025    MCV 75 (L) 03/14/2025     03/14/2025     CMP  Sodium   Date Value Ref Range Status   03/14/2025 139 136 - 145 mmol/L Final     Potassium   Date Value Ref Range Status   03/14/2025 3.6 3.5 - 5.1 mmol/L Final     Chloride   Date Value Ref Range Status   03/14/2025 104 95 - 110 mmol/L Final     CO2   Date Value Ref Range Status   03/14/2025 26 23 - 29 mmol/L Final     Glucose   Date Value Ref Range Status   03/14/2025 86 70 - 110 mg/dL Final     BUN   Date Value Ref Range Status   03/14/2025 16 6 - 20 mg/dL Final     Creatinine   Date Value Ref Range Status   03/14/2025 0.7 0.5 - 1.4 mg/dL Final     Calcium   Date Value Ref Range Status   03/14/2025 9.9 8.7 - 10.5 mg/dL Final     Total Protein   Date Value Ref Range Status   03/06/2023 6.9 6.0 - 8.4 g/dL Final     Albumin   Date Value Ref Range Status   03/06/2023 4.1 3.5 - 5.2 g/dL Final     Total Bilirubin   Date Value Ref Range Status   03/06/2023 0.9 0.1 - 1.0 mg/dL Final     Comment:     For infants and newborns, interpretation of results should be based  on gestational age, weight and in agreement with clinical  observations.    Premature Infant recommended reference ranges:  Up to 24 hours.............<8.0 mg/dL  Up to 48 hours............<12.0 mg/dL  3-5 days..................<15.0 mg/dL  6-29 days.................<15.0  "mg/dL       Alkaline Phosphatase   Date Value Ref Range Status   03/06/2023 52 (L) 55 - 135 U/L Final     AST   Date Value Ref Range Status   03/06/2023 10 10 - 40 U/L Final     ALT   Date Value Ref Range Status   03/06/2023 11 10 - 44 U/L Final     Anion Gap   Date Value Ref Range Status   03/14/2025 9 8 - 16 mmol/L Final     eGFR   Date Value Ref Range Status   03/14/2025 >60.0 >60 mL/min/1.73 m^2 Final         PHYSICAL EXAM   Vital Signs  /86   Pulse 76   Ht 5' 3" (1.6 m)   Wt 64.3 kg (141 lb 10.3 oz)   LMP 02/21/2022   BMI 25.09 kg/m²     Physical Exam    General: Well-developed. Well-nourished. No acute distress.  Eyes: EOMI. Sclerae anicteric.  HENT: Normocephalic. Atraumatic. Nares patent. Moist oral mucosa.  Cardiovascular: Regular rate. Regular rhythm. No murmurs. No rubs. No gallops. Normal S1, S2.  Respiratory: Normal respiratory effort. Clear to auscultation bilaterally. No rales. No rhonchi. No wheezing.  Musculoskeletal: No  obvious deformity.  Extremities: No lower extremity edema.  Neurological: Alert & oriented x3. No slurred speech. Normal gait.  Psychiatric: Normal mood. Normal affect. Good insight. Good judgment.  Skin: Warm. Dry. No rash.       Assessment:   49 y.o. female here for primary care visit       Plan:   1. Fatigue, unspecified type  Assessment & Plan:  She has been having some hot flashes with clamminess at night, and recent onset of severe fatigue  --she does report a history of chronic insomnia (has never tried any sedative medications)  **check ferritin  **check estradiol, LH, FSH  **trial of gabapentin 100 mg qhs (both for hot flashes as well as sleep)    Orders:  -     Ferritin; Future; Expected date: 06/13/2025  -     Estradiol; Future; Expected date: 06/13/2025  -     Follicle Stimulating Hormone; Future; Expected date: 06/13/2025  -     Luteinizing Hormone; Future; Expected date: 06/13/2025    2. Hot flashes  Assessment & Plan:  S/P hysterectomy several years " ago  --she could be perimenopausal  **will check hormone levels and give a trial of gabapentin (which may help with sleep and with hot flashes)    Orders:  -     gabapentin (NEURONTIN) 100 MG capsule; Take 1 capsule (100 mg total) by mouth every evening.  Dispense: 90 capsule; Refill: 1    3. Primary hypertension  Overview:  Current regimen:  Exforge-HCT 5-160-25 mg daily    Assessment & Plan:  Review of her digital readings show borderline but acceptable control  --if BP trend too high, will intensify Tx:  could try going to Exforge-HCT -25 mg (but she experienced ankle edema with 10 mg dose of amlodipine in the past)        Follow up keep regularly scheduled follow-up.    Yomi Jones MD/[facility] [place]  [place] Medicine  [facility]

## 2025-06-12 ENCOUNTER — TELEPHONE (OUTPATIENT)
Dept: DERMATOLOGY | Facility: CLINIC | Age: 50
End: 2025-06-12
Payer: COMMERCIAL

## 2025-06-13 ENCOUNTER — OFFICE VISIT (OUTPATIENT)
Dept: PRIMARY CARE CLINIC | Facility: CLINIC | Age: 50
End: 2025-06-13
Attending: INTERNAL MEDICINE
Payer: COMMERCIAL

## 2025-06-13 ENCOUNTER — RESULTS FOLLOW-UP (OUTPATIENT)
Dept: PRIMARY CARE CLINIC | Facility: CLINIC | Age: 50
End: 2025-06-13

## 2025-06-13 ENCOUNTER — LAB VISIT (OUTPATIENT)
Dept: LAB | Facility: HOSPITAL | Age: 50
End: 2025-06-13
Payer: COMMERCIAL

## 2025-06-13 ENCOUNTER — TELEPHONE (OUTPATIENT)
Dept: ORTHOPEDICS | Facility: CLINIC | Age: 50
End: 2025-06-13
Payer: COMMERCIAL

## 2025-06-13 VITALS
WEIGHT: 141.63 LBS | DIASTOLIC BLOOD PRESSURE: 86 MMHG | HEIGHT: 63 IN | SYSTOLIC BLOOD PRESSURE: 132 MMHG | HEART RATE: 76 BPM | BODY MASS INDEX: 25.09 KG/M2

## 2025-06-13 DIAGNOSIS — I10 PRIMARY HYPERTENSION: ICD-10-CM

## 2025-06-13 DIAGNOSIS — R53.83 FATIGUE, UNSPECIFIED TYPE: ICD-10-CM

## 2025-06-13 DIAGNOSIS — R23.2 HOT FLASHES: ICD-10-CM

## 2025-06-13 DIAGNOSIS — R53.83 FATIGUE, UNSPECIFIED TYPE: Primary | ICD-10-CM

## 2025-06-13 LAB
ESTRADIOL SERPL HS-MCNC: 20 PG/ML
FERRITIN SERPL-MCNC: 82 NG/ML (ref 20–300)
FSH SERPL-ACNC: 37.03 MIU/ML
LH SERPL-ACNC: 12.1 MIU/ML

## 2025-06-13 PROCEDURE — 83002 ASSAY OF GONADOTROPIN (LH): CPT

## 2025-06-13 PROCEDURE — 82670 ASSAY OF TOTAL ESTRADIOL: CPT

## 2025-06-13 PROCEDURE — 36415 COLL VENOUS BLD VENIPUNCTURE: CPT

## 2025-06-13 PROCEDURE — 82728 ASSAY OF FERRITIN: CPT

## 2025-06-13 PROCEDURE — 83001 ASSAY OF GONADOTROPIN (FSH): CPT

## 2025-06-13 RX ORDER — GABAPENTIN 100 MG/1
100 CAPSULE ORAL NIGHTLY
Qty: 90 CAPSULE | Refills: 1 | Status: SHIPPED | OUTPATIENT
Start: 2025-06-13 | End: 2025-12-10

## 2025-06-13 NOTE — ASSESSMENT & PLAN NOTE
S/P hysterectomy several years ago  --she could be perimenopausal  **will check hormone levels and give a trial of gabapentin (which may help with sleep and with hot flashes)

## 2025-06-13 NOTE — ASSESSMENT & PLAN NOTE
She has been having some hot flashes with clamminess at night, and recent onset of severe fatigue  --she does report a history of chronic insomnia (has never tried any sedative medications)  **check ferritin  **check estradiol, LH, FSH  **trial of gabapentin 100 mg qhs (both for hot flashes as well as sleep)

## 2025-06-13 NOTE — ASSESSMENT & PLAN NOTE
Review of her digital readings show borderline but acceptable control  --if BP trend too high, will intensify Tx:  could try going to Exforge-HCT -25 mg (but she experienced ankle edema with 10 mg dose of amlodipine in the past)

## 2025-06-13 NOTE — TELEPHONE ENCOUNTER
Patient communication:    Notified patient to stop at [place] Location - 1st floor check in [address] [address] 30 minutes prior to your appointment time on 6.16.25 with Dr. Cohen for x-rays.     Made them aware that this is not a scheduled xray appointment and they might be running behind as they are considered a walk-in xray.    Verbalized the Following:  *Please arrive at your informed time above, if you are more than 15 Minutes late to your appointment with Dr. Cohen we will have to reschedule your appointment. This will allow you to be seen in a timely manor and be conscious to other patients being seen that same day*

## 2025-06-16 ENCOUNTER — HOSPITAL ENCOUNTER (OUTPATIENT)
Dept: RADIOLOGY | Facility: HOSPITAL | Age: 50
Discharge: HOME | End: 2025-06-16
Attending: ORTHOPAEDIC SURGERY
Payer: COMMERCIAL

## 2025-06-16 ENCOUNTER — OFFICE VISIT (OUTPATIENT)
Dept: ORTHOPEDICS | Facility: CLINIC | Age: 50
End: 2025-06-16
Payer: COMMERCIAL

## 2025-06-16 DIAGNOSIS — M18.12 PRIMARY OSTEOARTHRITIS OF FIRST CARPOMETACARPAL JOINT OF LEFT HAND: ICD-10-CM

## 2025-06-16 DIAGNOSIS — Z98.890 POSTOPERATIVE STATE: ICD-10-CM

## 2025-06-16 DIAGNOSIS — M18.12 PRIMARY OSTEOARTHRITIS OF FIRST CARPOMETACARPAL JOINT OF LEFT HAND: Primary | ICD-10-CM

## 2025-06-16 PROCEDURE — 73130 X-RAY EXAM OF HAND: CPT | Mod: 26,LT,, | Performed by: RADIOLOGY

## 2025-06-16 PROCEDURE — 73130 X-RAY EXAM OF HAND: CPT | Mod: TC,LT

## 2025-06-16 PROCEDURE — 1160F RVW MEDS BY RX/DR IN RCRD: CPT | Mod: CPTII,S$GLB,, | Performed by: ORTHOPAEDIC SURGERY

## 2025-06-16 PROCEDURE — 3066F NEPHROPATHY DOC TX: CPT | Mod: CPTII,S$GLB,, | Performed by: ORTHOPAEDIC SURGERY

## 2025-06-16 PROCEDURE — 99024 POSTOP FOLLOW-UP VISIT: CPT | Mod: S$GLB,,, | Performed by: ORTHOPAEDIC SURGERY

## 2025-06-16 PROCEDURE — 3061F NEG MICROALBUMINURIA REV: CPT | Mod: CPTII,S$GLB,, | Performed by: ORTHOPAEDIC SURGERY

## 2025-06-16 PROCEDURE — 1159F MED LIST DOCD IN RCRD: CPT | Mod: CPTII,S$GLB,, | Performed by: ORTHOPAEDIC SURGERY

## 2025-06-16 PROCEDURE — 99999 PR PBB SHADOW E&M-EST. PATIENT-LVL III: CPT | Mod: PBBFAC,,, | Performed by: ORTHOPAEDIC SURGERY

## 2025-06-16 PROCEDURE — 3044F HG A1C LEVEL LT 7.0%: CPT | Mod: CPTII,S$GLB,, | Performed by: ORTHOPAEDIC SURGERY

## 2025-06-16 NOTE — PROGRESS NOTES
Nan Mendez presents for post-operative evaluation of   Encounter Diagnoses   Name Primary?    Primary osteoarthritis of first carpometacarpal joint of left hand Yes    Postoperative state    . The patient is now 12 weeks 4 day s/p 3/20/25 Left thumb cmc arthroplasty, loose body removal.  Overall the patient reports doing well.    History of Present Illness    HPI:  She reports significant improvement in hand function, stating that it is definitely getting stronger. She denies pain unless performing specific activities, mentioning she can carry things with it without causing significant discomfort. She reports difficulty when trying to use the hand while sitting on the floor, noting discomfort when pressure is applied to the bottom of the hand. She has been working with Lilly, who has provided exercises to improve strength and mobility. She expresses satisfaction with the progress and is pleased with the decision to have the surgery.    Vitals:    06/16/25 1459   PainSc: 0-No pain   PainLoc: Hand       PE:    AA&O x 4.  NAD  HEENT:  NCAT, sclera nonicteric  Lungs:  Respirations are equal and unlabored.  CV:  2+ bilateral upper and lower extremity pulses.  MSK:     The incision is well healed.  Full wrist, thumb and finger motion.  Neurovascularly intact and has 5/5 thenar and intrinsic musculature strength.       Diagnostic studies and other clinical records review:    Assessment & Plan:        Status post above, doing well  Continue with range of motion and strengthening  F/U prn  Call with any questions/concerns in the interim        Isidra Cohen MD    Please be aware that this note has been generated with the assistance of [employer] voice-to-text.  Please excuse any spelling or grammatical errors.  This note was generated with the assistance of ambient listening technology. Verbal consent was obtained by the patient and accompanying visitor(s) for the recording of patient appointment to facilitate this  note. I attest to having reviewed and edited the generated note for accuracy, though some syntax or spelling errors may persist. Please contact the author of this note for any clarification.

## 2025-07-15 ENCOUNTER — CLINICAL SUPPORT (OUTPATIENT)
Dept: REHABILITATION | Facility: HOSPITAL | Age: 50
End: 2025-07-15
Payer: COMMERCIAL

## 2025-07-15 DIAGNOSIS — M25.642 DECREASED RANGE OF MOTION OF LEFT THUMB: Primary | ICD-10-CM

## 2025-07-15 PROCEDURE — 97110 THERAPEUTIC EXERCISES: CPT

## 2025-07-15 NOTE — PROGRESS NOTES
Outpatient Rehab    Occupational Therapy Visit    Patient Name: Nan Mendez  MRN: [id number]  YOB: 1975  Encounter Date: 7/15/2025    Therapy Diagnosis:   Encounter Diagnosis   Name Primary?    Decreased range of motion of left thumb Yes     Physician: Yomi Jones MD    Physician Orders: Eval and Treat  Medical Diagnosis: Pain in left hand  Unilateral primary osteoarthritis of first carpometacarpal joint, left hand  Surgical Diagnosis: L CMC interposition arthoplasty   Surgical Date: Not applicable for this Episode  Days Since Last Surgery: Not applicable for this Episode    Visit # / Visits Authorized: 16 / 20  Insurance Authorization Period: 2/18/2025 to 12/31/2025  Date of Evaluation: 3/31/2025  Plan of Care Certification: 4/4/2025 to 6/27/2025      Time In: 0930   Time Out: 1030  Total Time (in minutes): 60   Total Billable Time (in minutes):      FOTO:  Intake Score: 45%  Survey Score 2: Not applicable for this Episode%  Survey Score 3: Not applicable for this Episode%    Precautions:       Subjective   no pain , she just came back from her trip , she reports that she has full strength , she is pleased with her ability.         Objective        Left  Strength  Left Hand Dynamometer Position: 2  Elbow Position Forearm Position Trial 1 (lbs) Trial 2 (lbs) Trial 3 (lbs) Average (lbs) Pain   Flexed Neutral 37 39 33 36.33         Left Pinch Strength   Trial 1 (lbs) Trial 2 (lbs) Trial 3 (lbs) Average (lbs) Pain   Lateral (Key Pinch) 6 6 6 6     Three Point (Three Jaw Aaron) 7 7 7 7     Two Point (Tip to Tip) 5 5 6 5.33                    Treatment:  Therapeutic Exercise  TE 5: red clothes pin pom pom   TE 6: hammer sup/ pro , wrist flexion ext 2# , hand gripper white spring 30 reps    Time Entry(in minutes):       Assessment & Plan   Assessment: Pt has been on vacation however she increased her  strength by 10# and feels that she can do everything she needs , she is  independent and functional          The patient will continue to benefit from skilled outpatient occupational therapy in order to address the deficits listed in the problem list on the initial evaluation, provide patient and family education, and maximize the patients level of independence in the home and community environments.     The patient's spiritual, cultural, and educational needs were considered, and the patient is agreeable to the plan of care and goals.           Plan: pt to be d/c today    Goals:   Active       Strength       Patient will achieve left  strength of left  in the two position (Progressing)       Start:  05/20/25    Expected End:  06/30/25                Lilly Head OT

## 2025-07-17 NOTE — ED ADULT TRIAGE NOTE - MEANS OF ARRIVAL
"Copied from CRM #[id number]. Topic: General Inquiry - Patient Advice  >> Jul 17, 2025 12:21 PM Jacqueline wrote:  Patient sent a mychart msg yesterday and didn't hear back Please contact pt at [phone]. Msg below    " Would it be better to do medication before talking to a licensed therapist? I'm sorry that I always think of questions after our visits are over."  "
Called patient and voicemail is full,  will  send portal message  
ambulatory

## 2025-07-23 ENCOUNTER — OFFICE VISIT (OUTPATIENT)
Dept: DERMATOLOGY | Facility: CLINIC | Age: 50
End: 2025-07-23
Payer: COMMERCIAL

## 2025-07-23 DIAGNOSIS — C44.519 BCC (BASAL CELL CARCINOMA), BACK: Primary | ICD-10-CM

## 2025-07-23 PROCEDURE — 99999 PR PBB SHADOW E&M-EST. PATIENT-LVL III: CPT | Mod: PBBFAC,,, | Performed by: DERMATOLOGY

## 2025-07-23 PROCEDURE — 99499 UNLISTED E&M SERVICE: CPT | Mod: S$GLB,,, | Performed by: DERMATOLOGY

## 2025-07-23 PROCEDURE — 17261 DSTRJ MAL LES T/A/L .6-1.0CM: CPT | Mod: S$GLB,,, | Performed by: DERMATOLOGY

## 2025-07-23 NOTE — PROGRESS NOTES
Subjective:      Patient ID:  Nan Mendez is a 49 y.o. female who presents for   Chief Complaint   Patient presents with    Skin Cancer     ED&C - L upper back     Pt here today for ED&C - L upper back      Final Diagnosis       Date                     Value               Ref Range           Status                06/09/2025                                                       Final             1. Skin, left upper back, shave biopsy:   - BASAL CELL CARCINOMA, SUPERFICIAL-MULTIFOCAL TYPE  - The deep and peripheral tissue edges are uninvolved by tumor. This lesion is skin cancer. You will be contacted regarding treatment.  [FORMATTING REMOVED]  ----------              Review of Systems   Skin:  Positive for daily sunscreen use, activity-related sunscreen use and wears hat. Negative for recent sunburn.   Hematologic/Lymphatic: Does not bruise/bleed easily.       Objective:   Physical Exam   Constitutional: She appears well-developed and well-nourished. No distress.   Neurological: She is alert and oriented to person, place, and time. She is not disoriented.   Psychiatric: She has a normal mood and affect.   Skin:   Areas Examined (abnormalities noted in diagram):   Back Inspection Performed            Diagram Legend     Erythematous scaling macule/papule c/w actinic keratosis       Vascular papule c/w angioma      Pigmented verrucoid papule/plaque c/w seborrheic keratosis      Yellow umbilicated papule c/w sebaceous hyperplasia      Irregularly shaped tan macule c/w lentigo     1-2 mm smooth white papules consistent with Milia      Movable subcutaneous cyst with punctum c/w epidermal inclusion cyst      Subcutaneous movable cyst c/w pilar cyst      Firm pink to brown papule c/w dermatofibroma      Pedunculated fleshy papule(s) c/w skin tag(s)      Evenly pigmented macule c/w junctional nevus     Mildly variegated pigmented, slightly irregular-bordered macule c/w mildly atypical nevus      Flesh colored to  evenly pigmented papule c/w intradermal nevus       Pink pearly papule/plaque c/w basal cell carcinoma      Erythematous hyperkeratotic cursted plaque c/w SCC      Surgical scar with no sign of skin cancer recurrence      Open and closed comedones      Inflammatory papules and pustules      Verrucoid papule consistent consistent with wart     Erythematous eczematous patches and plaques     Dystrophic onycholytic nail with subungual debris c/w onychomycosis     Umbilicated papule    Erythematous-base heme-crusted tan verrucoid plaque consistent with inflamed seborrheic keratosis     Erythematous Silvery Scaling Plaque c/w Psoriasis     See annotation      Assessment / Plan:        BCC (basal cell carcinoma), back  Here for electrodesiccation and curettage of BCC on the left upper back . bx done on 06/9/2025:      Electrodessication and Curettage Procedure note:    Verbal consent obtained. Lesional tissue marked and prepped with alcohol. Lesion anesthetized with 1% lidocaine with epinephrine. Curettage and Desiccation x 3 cycles to base. Aluminum chloride for hemostasis. Lesion size after primary curettage: 1.0 cm    Area bandaged and wound care explained.    F/u 6 months             Follow up in about 6 months (around 1/23/2026) for TBSE.

## (undated) DEVICE — Device

## (undated) DEVICE — OBTURATOR BLADELESS 8MM XI CLR

## (undated) DEVICE — SEE MEDLINE ITEM 157110

## (undated) DEVICE — FORCEP STRAIGHT DISP

## (undated) DEVICE — IMMOBILIZER HAND

## (undated) DEVICE — GLOVE BIOGEL SKINSENSE PI 6.5

## (undated) DEVICE — TOWEL OR DISP STRL BLUE 4/PK

## (undated) DEVICE — SUT VICRYL PLUS 0 CT1 36IN

## (undated) DEVICE — SET TRI-LUMEN FILTERED TUBE

## (undated) DEVICE — SOL WATER STRL IRR 1000ML

## (undated) DEVICE — UNDERGLOVE BIOGEL PI SZ 6.5 LF

## (undated) DEVICE — SOL NS 1000CC

## (undated) DEVICE — GOWN ECLIPSE REINF LVL4 TWL XL

## (undated) DEVICE — SUT VICRYL 0 27 CT-2

## (undated) DEVICE — DRAPE MINI C ARM STERILE

## (undated) DEVICE — CONTAINER SPECIMEN OR STER 4OZ

## (undated) DEVICE — SOL NACL IRR 1000ML BTL

## (undated) DEVICE — SEE MEDLINE ITEM 156923

## (undated) DEVICE — GLOVE BIOGEL PI MICRO SZ 7

## (undated) DEVICE — DRESSING N ADH OIL EMUL 3X3

## (undated) DEVICE — CORD FOR BIPOLAR FORCEPS 12

## (undated) DEVICE — SUT 4/0 27IN PDS II VIO MO

## (undated) DEVICE — PILLOW TROOP ELEVATION DISP

## (undated) DEVICE — POSITIONER HEAD ADULT

## (undated) DEVICE — SLING ARM MEDIUM FOAM STRAP

## (undated) DEVICE — ELECTRODE REM PLYHSV RETURN 9

## (undated) DEVICE — BLADE SAGITTAL FINE 5.5 X 18.5

## (undated) DEVICE — SPLINT PLASTER EXT FAST 4X15

## (undated) DEVICE — DRAPE STERI INSTRUMENT 1018

## (undated) DEVICE — TIP RUMI KOH-EFFICIENT 3.5

## (undated) DEVICE — JELLY SURGILUBE 5GR

## (undated) DEVICE — ELECTRODE NEEDLE 1IN

## (undated) DEVICE — SYS SEE SHARP SCOPE ANTIFOG

## (undated) DEVICE — SUT PROLENE 3-0 FS-2 18

## (undated) DEVICE — SOL STRL WATER INJ 1000ML BG

## (undated) DEVICE — SOL PVP-I SCRUB 7.5% 4OZ

## (undated) DEVICE — IRRIGATOR ENDOSCOPY DISP.

## (undated) DEVICE — SUT MCRYL PLUS 4-0 PS2 27IN

## (undated) DEVICE — NDL INSUFFLATION VERRES 120MM

## (undated) DEVICE — KIT WING PAD POSITIONING

## (undated) DEVICE — MARKER SKIN RULER STERILE

## (undated) DEVICE — DRAPE COLUMN DAVINCI XI

## (undated) DEVICE — ADHESIVE DERMABOND ADVANCED

## (undated) DEVICE — SUT 4/0 18IN PDS II CLR MO

## (undated) DEVICE — GLOVE SENSICARE PI ALOE 7.5

## (undated) DEVICE — BLADE SURG #15 CARBON STEEL

## (undated) DEVICE — SOL ELECTROLUBE ANTI-STIC

## (undated) DEVICE — TOURNIQUET SB QC DP 18X4IN

## (undated) DEVICE — BLADE SCALP OPHTL RND TIP

## (undated) DEVICE — GLOVE BIOGEL SKINSENSE PI 7.5

## (undated) DEVICE — PAD CAST SPECIALIST STRL 3

## (undated) DEVICE — DRAPE ARM DAVINCI XI

## (undated) DEVICE — DRESSING TELFA N ADH 3X8

## (undated) DEVICE — TIP RUMI WHITE DISP UMW676

## (undated) DEVICE — BNDG COFLEX FOAM LF2 ST 3X5YD

## (undated) DEVICE — SPONGE COTTON TRAY 4X4IN

## (undated) DEVICE — COVER CAMERA OPERATING ROOM